# Patient Record
Sex: FEMALE | Race: WHITE | NOT HISPANIC OR LATINO | Employment: FULL TIME | URBAN - METROPOLITAN AREA
[De-identification: names, ages, dates, MRNs, and addresses within clinical notes are randomized per-mention and may not be internally consistent; named-entity substitution may affect disease eponyms.]

---

## 2019-12-15 ENCOUNTER — HOSPITAL ENCOUNTER (EMERGENCY)
Facility: HOSPITAL | Age: 59
Discharge: HOME | End: 2019-12-15
Attending: EMERGENCY MEDICINE
Payer: COMMERCIAL

## 2019-12-15 VITALS
TEMPERATURE: 98.7 F | SYSTOLIC BLOOD PRESSURE: 164 MMHG | DIASTOLIC BLOOD PRESSURE: 87 MMHG | HEART RATE: 58 BPM | OXYGEN SATURATION: 95 % | RESPIRATION RATE: 18 BRPM

## 2019-12-15 DIAGNOSIS — F41.9 ANXIETY: Primary | ICD-10-CM

## 2019-12-15 PROCEDURE — 99283 EMERGENCY DEPT VISIT LOW MDM: CPT

## 2019-12-15 PROCEDURE — 99282 EMERGENCY DEPT VISIT SF MDM: CPT | Performed by: EMERGENCY MEDICINE

## 2019-12-15 RX ORDER — METOPROLOL SUCCINATE 50 MG/1
25 TABLET, EXTENDED RELEASE ORAL DAILY
COMMUNITY
Start: 2019-12-10

## 2019-12-15 RX ORDER — FAMOTIDINE 40 MG/1
40 TABLET, FILM COATED ORAL DAILY
COMMUNITY
Start: 2019-12-10 | End: 2022-07-26

## 2019-12-15 NOTE — ED PROVIDER NOTES
Emergency Department Note    Encounter Date 12/15/2019    Diagnosis  1  Anxiety        MDM  Number of Diagnoses or Management Options  Anxiety:   Diagnosis management comments: Presents with anxiety reaction patient believes is related to antihypertensive medication she has been placed on  Afebrile  Elevated blood pressure reading on arrival improved during ED stay without any specific treatment otherwise VSS  Exam reveals somewhat anxious patient with no concerning findings who is responsive to calming measures and talking through things  Clinical suspicion for significant medication side effect is very low at the time of this report  I suspect an underlying anxiety about being on medications that she does not want to stay on  Had long discussion with patient and family at bedside regarding Wellness and healthy living and both were very responsive and optimistic after this encounter  No strong indication for diagnostics at the time of this report  Okay for discharge home with instructions for follow-up and signs and symptoms that would warrant return to the emergency department  CC  Chief Complaint   Patient presents with    Anxiety       PMH significant for HTN complains of gradual onset anxiety reactions regarding and revolving around antihypertensive medications that she was on first losartan and then metoprolol of fluctuating intensity over the past few days  Denies thoughts of SI/HI, fever, rash, joint pain/swelling, headache, vision changes, hearing changes, chest pain, shortness of breath, abdominal pain and changes in bladder habits  History  No current facility-administered medications for this encounter       Current Outpatient Medications:     famotidine (PEPCID) 40 MG tablet, Take 40 mg by mouth daily, Disp: , Rfl:     metoprolol succinate (TOPROL-XL) 50 mg 24 hr tablet, Take 50 mg by mouth daily, Disp: , Rfl:      Past Medical History:   Diagnosis Date    GERD (gastroesophageal reflux disease)     Hypertension        Past Surgical History:   Procedure Laterality Date    ABDOMINAL SURGERY      DILATION AND CURETTAGE, DIAGNOSTIC / THERAPEUTIC      TONSILLECTOMY      TUBAL LIGATION         History reviewed  No pertinent family history  Social History     Socioeconomic History    Marital status: /Civil Union     Spouse name: Not on file    Number of children: Not on file    Years of education: Not on file    Highest education level: Not on file   Occupational History    Not on file   Social Needs    Financial resource strain: Not on file    Food insecurity:     Worry: Not on file     Inability: Not on file    Transportation needs:     Medical: Not on file     Non-medical: Not on file   Tobacco Use    Smoking status: Former Smoker     Types: Cigarettes     Last attempt to quit: 4/15/2007     Years since quittin 6    Smokeless tobacco: Never Used   Substance and Sexual Activity    Alcohol use: Yes     Comment: social    Drug use: Never    Sexual activity: Not on file   Lifestyle    Physical activity:     Days per week: Not on file     Minutes per session: Not on file    Stress: Not on file   Relationships    Social connections:     Talks on phone: Not on file     Gets together: Not on file     Attends [employer] service: Not on file     Active member of club or organization: Not on file     Attends meetings of clubs or organizations: Not on file     Relationship status: Not on file    Intimate partner violence:     Fear of current or ex partner: Not on file     Emotionally abused: Not on file     Physically abused: Not on file     Forced sexual activity: Not on file   Other Topics Concern    Not on file   Social History Narrative    Not on file       Review of Systems   All other systems reviewed and are negative        Vital Signs  Vitals:    12/15/19 1757 12/15/19 1829   BP: (!) 185/86 164/87   TempSrc: Temporal    Pulse: 60 58   Resp: 18 Patient Position - Orthostatic VS:  Sitting   Temp: 98 7 °F (37 1 °C)        Physical Exam   Constitutional: She appears well-developed and well-nourished  HENT:   Head: Normocephalic and atraumatic  Eyes: Conjunctivae and EOM are normal    Neck: Normal range of motion  Neck supple  Cardiovascular: Normal rate and regular rhythm  Pulmonary/Chest: Effort normal and breath sounds normal    Musculoskeletal: Normal range of motion  She exhibits no deformity  Neurological: She is alert  No focal deficit   Skin: Skin is warm and dry  Psychiatric: She has a normal mood and affect  Her behavior is normal    Nursing note and vitals reviewed  ED Medications  Medications - No data to display    Procedures   None  ED Course        Disposition  Time reflects when diagnosis was documented in both MDM as applicable and the Disposition within this note     Time User Action Codes Description Comment    12/15/2019  6:09 PM Manjit Simpson Add [F41 9] Anxiety       ED Disposition     ED Disposition Condition Date/Time Comment    Discharge Stable Sun Dec 15, 2019  6:09 PM Gala Starch Cutting discharge to home/self care              Follow-up Information     Follow up With Specialties Details Why [address] Call in 2 days To schedule an appointment for re-evaluation [address] [place] [address]  [phone]             ED Provider  Electronically signed by:     Sarah Perez DO  12/15/19 1833

## 2020-09-03 ENCOUNTER — APPOINTMENT (EMERGENCY)
Dept: CT IMAGING | Facility: HOSPITAL | Age: 60
End: 2020-09-03
Payer: COMMERCIAL

## 2020-09-03 ENCOUNTER — HOSPITAL ENCOUNTER (EMERGENCY)
Facility: HOSPITAL | Age: 60
Discharge: HOME | End: 2020-09-03
Attending: EMERGENCY MEDICINE | Admitting: EMERGENCY MEDICINE
Payer: COMMERCIAL

## 2020-09-03 VITALS
RESPIRATION RATE: 18 BRPM | TEMPERATURE: 98.2 F | OXYGEN SATURATION: 97 % | DIASTOLIC BLOOD PRESSURE: 93 MMHG | SYSTOLIC BLOOD PRESSURE: 200 MMHG | HEART RATE: 62 BPM

## 2020-09-03 DIAGNOSIS — R10.9 ABDOMINAL PAIN: Primary | ICD-10-CM

## 2020-09-03 DIAGNOSIS — K62.5 RECTAL BLEEDING: ICD-10-CM

## 2020-09-03 DIAGNOSIS — K57.92 DIVERTICULITIS: ICD-10-CM

## 2020-09-03 LAB
ALBUMIN SERPL BCP-MCNC: 4.2 G/DL (ref 3.5–5.7)
ALP SERPL-CCNC: 79 U/L (ref 55–165)
ALT SERPL W P-5'-P-CCNC: 18 U/L (ref 7–52)
ANION GAP SERPL CALCULATED.3IONS-SCNC: 5 MMOL/L (ref 4–13)
AST SERPL W P-5'-P-CCNC: 14 U/L (ref 13–39)
BACTERIA UR QL AUTO: ABNORMAL /HPF
BASOPHILS # BLD AUTO: 0 THOUSANDS/ΜL (ref 0–0.1)
BASOPHILS NFR BLD AUTO: 1 % (ref 0–2)
BILIRUB SERPL-MCNC: 0.9 MG/DL (ref 0.2–1)
BILIRUB UR QL STRIP: NEGATIVE
BUN SERPL-MCNC: 14 MG/DL (ref 7–25)
CALCIUM SERPL-MCNC: 9 MG/DL (ref 8.6–10.5)
CHLORIDE SERPL-SCNC: 107 MMOL/L (ref 98–107)
CLARITY UR: CLEAR
CO2 SERPL-SCNC: 27 MMOL/L (ref 21–31)
COLOR UR: YELLOW
CREAT SERPL-MCNC: 0.78 MG/DL (ref 0.6–1.2)
EOSINOPHIL # BLD AUTO: 0.1 THOUSAND/ΜL (ref 0–0.61)
EOSINOPHIL NFR BLD AUTO: 2 % (ref 0–5)
ERYTHROCYTE [DISTWIDTH] IN BLOOD BY AUTOMATED COUNT: 13 % (ref 11.5–14.5)
GFR SERPL CREATININE-BSD FRML MDRD: 82 ML/MIN/1.73SQ M
GLUCOSE SERPL-MCNC: 106 MG/DL (ref 65–99)
GLUCOSE UR STRIP-MCNC: NEGATIVE MG/DL
HCT VFR BLD AUTO: 45.8 % (ref 42–47)
HGB BLD-MCNC: 15.5 G/DL (ref 12–16)
HGB UR QL STRIP.AUTO: NEGATIVE
KETONES UR STRIP-MCNC: NEGATIVE MG/DL
LEUKOCYTE ESTERASE UR QL STRIP: ABNORMAL
LIPASE SERPL-CCNC: 19 U/L (ref 11–82)
LYMPHOCYTES # BLD AUTO: 1.2 THOUSANDS/ΜL (ref 0.6–4.47)
LYMPHOCYTES NFR BLD AUTO: 24 % (ref 21–51)
MAGNESIUM SERPL-MCNC: 2 MG/DL (ref 1.9–2.7)
MCH RBC QN AUTO: 31.1 PG (ref 26–34)
MCHC RBC AUTO-ENTMCNC: 33.8 G/DL (ref 31–37)
MCV RBC AUTO: 92 FL (ref 81–99)
MONOCYTES # BLD AUTO: 0.4 THOUSAND/ΜL (ref 0.17–1.22)
MONOCYTES NFR BLD AUTO: 7 % (ref 2–12)
NEUTROPHILS # BLD AUTO: 3.4 THOUSANDS/ΜL (ref 1.4–6.5)
NEUTS SEG NFR BLD AUTO: 67 % (ref 42–75)
NITRITE UR QL STRIP: NEGATIVE
NON-SQ EPI CELLS URNS QL MICRO: ABNORMAL /HPF
PH UR STRIP.AUTO: 5.5 [PH]
PLATELET # BLD AUTO: 217 THOUSANDS/UL (ref 149–390)
PMV BLD AUTO: 9 FL (ref 8.6–11.7)
POTASSIUM SERPL-SCNC: 3.9 MMOL/L (ref 3.5–5.5)
PROT SERPL-MCNC: 6.5 G/DL (ref 6.4–8.9)
PROT UR STRIP-MCNC: NEGATIVE MG/DL
RBC # BLD AUTO: 4.99 MILLION/UL (ref 3.9–5.2)
RBC #/AREA URNS AUTO: ABNORMAL /HPF
SODIUM SERPL-SCNC: 139 MMOL/L (ref 134–143)
SP GR UR STRIP.AUTO: 1.02 (ref 1–1.03)
UROBILINOGEN UR QL STRIP.AUTO: 0.2 E.U./DL
WBC # BLD AUTO: 5.1 THOUSAND/UL (ref 4.8–10.8)
WBC #/AREA URNS AUTO: ABNORMAL /HPF

## 2020-09-03 PROCEDURE — 96361 HYDRATE IV INFUSION ADD-ON: CPT

## 2020-09-03 PROCEDURE — 85025 COMPLETE CBC W/AUTO DIFF WBC: CPT | Performed by: EMERGENCY MEDICINE

## 2020-09-03 PROCEDURE — 99285 EMERGENCY DEPT VISIT HI MDM: CPT

## 2020-09-03 PROCEDURE — 80053 COMPREHEN METABOLIC PANEL: CPT | Performed by: EMERGENCY MEDICINE

## 2020-09-03 PROCEDURE — 36415 COLL VENOUS BLD VENIPUNCTURE: CPT | Performed by: EMERGENCY MEDICINE

## 2020-09-03 PROCEDURE — 99284 EMERGENCY DEPT VISIT MOD MDM: CPT | Performed by: EMERGENCY MEDICINE

## 2020-09-03 PROCEDURE — 74177 CT ABD & PELVIS W/CONTRAST: CPT

## 2020-09-03 PROCEDURE — 81001 URINALYSIS AUTO W/SCOPE: CPT | Performed by: EMERGENCY MEDICINE

## 2020-09-03 PROCEDURE — 83690 ASSAY OF LIPASE: CPT | Performed by: EMERGENCY MEDICINE

## 2020-09-03 PROCEDURE — 96374 THER/PROPH/DIAG INJ IV PUSH: CPT

## 2020-09-03 PROCEDURE — G1004 CDSM NDSC: HCPCS

## 2020-09-03 PROCEDURE — 81003 URINALYSIS AUTO W/O SCOPE: CPT | Performed by: EMERGENCY MEDICINE

## 2020-09-03 PROCEDURE — 83735 ASSAY OF MAGNESIUM: CPT | Performed by: EMERGENCY MEDICINE

## 2020-09-03 RX ORDER — ONDANSETRON 2 MG/ML
4 INJECTION INTRAMUSCULAR; INTRAVENOUS ONCE
Status: DISCONTINUED | OUTPATIENT
Start: 2020-09-03 | End: 2020-09-03 | Stop reason: HOSPADM

## 2020-09-03 RX ORDER — AMOXICILLIN AND CLAVULANATE POTASSIUM 875; 125 MG/1; MG/1
1 TABLET, FILM COATED ORAL EVERY 12 HOURS
Qty: 14 TABLET | Refills: 0 | Status: SHIPPED | OUTPATIENT
Start: 2020-09-03 | End: 2020-09-13

## 2020-09-03 RX ADMIN — IOHEXOL 100 ML: 350 INJECTION, SOLUTION INTRAVENOUS at 11:02

## 2020-09-03 RX ADMIN — SODIUM CHLORIDE 1000 ML: 0.9 INJECTION, SOLUTION INTRAVENOUS at 08:50

## 2020-09-03 RX ADMIN — FAMOTIDINE 20 MG: 10 INJECTION, SOLUTION INTRAVENOUS at 08:52

## 2020-09-03 NOTE — DISCHARGE INSTRUCTIONS
You were seen for abdominal pain and bleeding  Your bloodwork was reassuring but your CT showed an infection called diverticulitis  Take the antibiotic as prescribed until it is finished      Follow up with gastroenterology for further care, and to discuss need for routine/screening colonoscopy for general care and colon cancer screening    Return if you develop worsening pain, or new symptoms

## 2020-09-03 NOTE — Clinical Note
Cesario Rucker was seen and treated in our emergency department on 9/3/2020  Diagnosis:     Loli Barry    She may return on this date: 09/05/2020         If you have any questions or concerns, please don't hesitate to call        Stanford Castleman, DO    ______________________________           _______________          _______________  Hospital Representative                              Date                                Time

## 2020-09-03 NOTE — ED PROVIDER NOTES
History  Chief Complaint   Patient presents with    Rectal Bleeding    Abdominal Pain     Patient is a 49-year-old female with chief complaint of abdominal pain and rectal bleeding which has been present for many years  Patient risk today because she is concerned about something serious going on  Patient describes cramping in the lower abdomen which was initially thought to be related to her uterus however she had a hysterectomy with no relief and I symptoms  She also describes bright red blood per rectum which has been intermittent in the setting of constipation, again over many years  Patient has been told she has hemorrhoids as a cause for this in the past   She has had intermittent nausea, no vomiting  Intermittent constipation and diarrhea  History provided by:  Patient  Abdominal Pain   Pain location:  Suprapubic  Pain quality: dull    Pain radiates to:  Does not radiate  Pain severity:  Mild  Onset quality:  Gradual  Duration:  2 days  Timing:  Intermittent  Progression:  Waxing and waning  Chronicity:  Chronic  Context: not laxative use and not sick contacts    Relieved by:  Nothing  Worsened by:  Nothing  Ineffective treatments:  None tried  Associated symptoms: no chest pain, no chills, no cough, no diarrhea, no dysuria, no fever, no nausea, no sore throat and no vomiting        Prior to Admission Medications   Prescriptions Last Dose Informant Patient Reported? Taking?    Multiple Vitamins-Minerals (ZINC PO)   Yes Yes   Sig: Take by mouth   cyanocobalamin (VITAMIN B-12) 1000 MCG tablet   Yes Yes   Sig: Take 1,000 mcg by mouth daily   famotidine (PEPCID) 40 MG tablet 9/2/2020 at Unknown time  Yes Yes   Sig: Take 40 mg by mouth daily   metoprolol succinate (TOPROL-XL) 50 mg 24 hr tablet 9/3/2020 at Unknown time Self Yes Yes   Sig: Take 25 mg by mouth daily       Facility-Administered Medications: None       Past Medical History:   Diagnosis Date    GERD (gastroesophageal reflux disease)     Hypertension     [facility] ([facility] [address] )     Urinary tract bacterial infections        Past Surgical History:   Procedure Laterality Date    ABDOMINAL SURGERY      DILATION AND CURETTAGE, DIAGNOSTIC / THERAPEUTIC      HYSTERECTOMY      TONSILLECTOMY      TUBAL LIGATION         History reviewed  No pertinent family history  I have reviewed and agree with the history as documented  E-Cigarette/Vaping    E-Cigarette Use Never User      E-Cigarette/Vaping Substances     Social History     Tobacco Use    Smoking status: Former Smoker     Types: Cigarettes     Last attempt to quit: 4/15/2007     Years since quittin 3    Smokeless tobacco: Never Used   Substance Use Topics    Alcohol use: Yes     Frequency: Monthly or less     Comment: social    Drug use: Never       Review of Systems   Constitutional: Negative for chills and fever  HENT: Negative for congestion, nosebleeds, rhinorrhea and sore throat  Eyes: Negative for pain and visual disturbance  Respiratory: Negative for cough and wheezing  Cardiovascular: Negative for chest pain and leg swelling  Gastrointestinal: Negative for abdominal distention, abdominal pain, diarrhea, nausea and vomiting  Genitourinary: Negative for dysuria and frequency  Musculoskeletal: Negative for back pain and joint swelling  Skin: Negative for rash and wound  Neurological: Negative for weakness and numbness  Psychiatric/Behavioral: Negative for decreased concentration and suicidal ideas  Physical Exam  Physical Exam  Vitals signs and nursing note reviewed  Constitutional:       Appearance: She is well-developed  HENT:      Head: Normocephalic and atraumatic  Eyes:      Conjunctiva/sclera: Conjunctivae normal       Pupils: Pupils are equal, round, and reactive to light  Neck:      Musculoskeletal: Normal range of motion and neck supple  Trachea: No tracheal deviation  Cardiovascular:      Rate and Rhythm: Normal rate and regular rhythm  Heart sounds: Normal heart sounds  No murmur  Pulmonary:      Effort: Pulmonary effort is normal  No respiratory distress  Breath sounds: Normal breath sounds  No wheezing or rales  Abdominal:      General: Bowel sounds are normal  There is no distension  Palpations: Abdomen is soft  Tenderness: There is no abdominal tenderness  Musculoskeletal:         General: No deformity  Skin:     General: Skin is warm and dry  Capillary Refill: Capillary refill takes less than 2 seconds  Neurological:      Mental Status: She is alert and oriented to person, place, and time  Sensory: No sensory deficit     Psychiatric:         Judgment: Judgment normal          Vital Signs  ED Triage Vitals   Temperature Pulse Respirations Blood Pressure SpO2   09/03/20 0822 09/03/20 0822 09/03/20 0822 09/03/20 0822 09/03/20 0822   98 2 °F (36 8 °C) 62 18 (!) 207/103 97 %      Temp Source Heart Rate Source Patient Position - Orthostatic VS BP Location FiO2 (%)   09/03/20 0822 -- -- -- --   Oral          Pain Score       09/03/20 1145       7           Vitals:    09/03/20 0822 09/03/20 1145   BP: (!) 207/103 (!) 200/93   Pulse: 62          Visual Acuity      ED Medications  Medications   sodium chloride 0 9 % bolus 1,000 mL (0 mL Intravenous Stopped 9/3/20 1145)   famotidine (PEPCID) injection 20 mg (20 mg Intravenous Given 9/3/20 0852)   iohexol (OMNIPAQUE) 350 MG/ML injection (MULTI-DOSE) 100 mL (100 mL Intravenous Given 9/3/20 1102)       Diagnostic Studies  Results Reviewed     Procedure Component Value Units Date/Time    Urine Microscopic [[id number]]  (Abnormal) Collected:  09/03/20 0937    Lab Status:  Final result Specimen:  Urine, Clean Catch Updated:  09/03/20 0959     RBC, UA None Seen /hpf      WBC, UA 4-10 /hpf      Epithelial Cells Occasional /hpf      Bacteria, UA Occasional /hpf     UA (URINE) with reflex to Scope [[id number]]  (Abnormal) Collected:  09/03/20 0937    Lab Status:  Final result Specimen:  Urine, Clean Catch Updated:  09/03/20 0956     Color, UA Yellow     Clarity, UA Clear     Specific [place], UA 1 020     pH, UA 5 5     Leukocytes, UA 1+     Nitrite, UA Negative     Protein, UA Negative mg/dl      Glucose, UA Negative mg/dl      Ketones, UA Negative mg/dl      Urobilinogen, UA 0 2 E U /dl      Bilirubin, UA Negative     Blood, UA Negative    Comprehensive metabolic panel [[id number]]  (Abnormal) Collected:  09/03/20 0849    Lab Status:  Final result Specimen:  Blood from Arm, Left Updated:  09/03/20 0924     Sodium 139 mmol/L      Potassium 3 9 mmol/L      Chloride 107 mmol/L      CO2 27 mmol/L      ANION GAP 5 mmol/L      BUN 14 mg/dL      Creatinine 0 78 mg/dL      Glucose 106 mg/dL      Calcium 9 0 mg/dL      AST 14 U/L      ALT 18 U/L      Alkaline Phosphatase 79 U/L      Total Protein 6 5 g/dL      Albumin 4 2 g/dL      Total Bilirubin 0 90 mg/dL      eGFR 82 ml/min/1 73sq m     Narrative:       [place] guidelines for Chronic Kidney Disease (CKD):     Stage 1 with normal or high GFR (GFR > 90 mL/min/1 73 square meters)    Stage 2 Mild CKD (GFR = 60-89 mL/min/1 73 square meters)    Stage 3A Moderate CKD (GFR = 45-59 mL/min/1 73 square meters)    Stage 3B Moderate CKD (GFR = 30-44 mL/min/1 73 square meters)    Stage 4 Severe CKD (GFR = 15-29 mL/min/1 73 square meters)    Stage 5 End Stage CKD (GFR <15 mL/min/1 73 square meters)  Note: GFR calculation is accurate only with a steady state creatinine    Lipase [[id number]]  (Normal) Collected:  09/03/20 0849    Lab Status:  Final result Specimen:  Blood from Arm, Left Updated:  09/03/20 0923     Lipase 19 u/L     Magnesium [[id number]]  (Normal) Collected:  09/03/20 0849    Lab Status:  Final result Specimen:  Blood from Arm, Left Updated:  09/03/20 0923     Magnesium 2 0 mg/dL     CBC and differential [[id number]]  (Normal) Collected:  09/03/20 0849    Lab Status:  Final result Specimen:  Blood from Arm, Left Updated:  09/03/20 0855     WBC 5 10 Thousand/uL      RBC 4 99 Million/uL      Hemoglobin 15 5 g/dL      Hematocrit 45 8 %      MCV 92 fL      MCH 31 1 pg      MCHC 33 8 g/dL      RDW 13 0 %      MPV 9 0 fL      Platelets 746 Thousands/uL      Neutrophils Relative 67 %      Lymphocytes Relative 24 %      Monocytes Relative 7 %      Eosinophils Relative 2 %      Basophils Relative 1 %      Neutrophils Absolute 3 40 Thousands/µL      Lymphocytes Absolute 1 20 Thousands/µL      Monocytes Absolute 0 40 Thousand/µL      Eosinophils Absolute 0 10 Thousand/µL      Basophils Absolute 0 00 Thousands/µL                  CT abdomen pelvis with contrast   Final Result by Lizzeth Coley MD (09/03 1114)   Cholelithiasis  Contracted gallbladder  No acute cholecystitis      Mild acute sigmoid diverticulitis without localized fluid collection or abscess               Workstation performed: [id number]                    Procedures  Procedures         ED Course       Patient with mild diverticulitis, comfortable with outpatient treatment  Will f/u with GI for routine colonoscopy                                      MDM  Number of Diagnoses or Management Options  Abdominal pain: new and requires workup  Diverticulitis: new and requires workup  Rectal bleeding: new and requires workup  Diagnosis management comments: Patient has intermittent abdominal pain and bleeding over many years, worse today  DDx includes diverticulitis, mass, abscess, NALLELY  Will get labs, CT abd/pelvis  No anticoagulation  Chart lists Iohexol allergy from 1/1/1980  Patient denies iohexol allergy; listed symptoms not consistent with anaphylaxis         Amount and/or Complexity of Data Reviewed  Clinical lab tests: ordered and reviewed  Tests in the radiology section of CPT®: ordered and reviewed  Review and summarize past medical records: yes  Independent visualization of images, tracings, or specimens: yes    Risk of Complications, Morbidity, and/or Mortality  Presenting problems: high  Diagnostic procedures: minimal  Management options: high          Disposition  Final diagnoses:   Abdominal pain   Rectal bleeding   Diverticulitis     Time reflects when diagnosis was documented in both MDM as applicable and the Disposition within this note     Time User Action Codes Description Comment    9/3/2020 11:14 AM Na Briones Add [R10 9] Abdominal pain     9/3/2020 11:14 AM Na Billet Add [K62 5] Rectal bleeding     9/3/2020 11:29 AM Na Deniset Add [K57 92] Diverticulitis       ED Disposition     ED Disposition Condition Date/Time Comment    Discharge Stable Th Sep 3, 2020 11:29 AM Daniel Cronin Cutting discharge to home/self care  Follow-up Information     Follow up With Specialties Details Why Contact Info    Johnathan Bowen MD Gastroenterology Schedule an appointment as soon as possible for a visit   [address] [place] [address], DO Family Medicine  As needed [address] [facility]  [address]  [phone]            Discharge Medication List as of 9/3/2020 11:33 AM      START taking these medications    Details   amoxicillin-clavulanate (AUGMENTIN) 875-125 mg per tablet Take 1 tablet by mouth every 12 (twelve) hours for 10 days, Starting Thu 9/3/2020, Until Sun 9/13/2020, Normal         CONTINUE these medications which have NOT CHANGED    Details   cyanocobalamin (VITAMIN B-12) 1000 MCG tablet Take 1,000 mcg by mouth daily, Historical Med      famotidine (PEPCID) 40 MG tablet Take 40 mg by mouth daily, Starting Tue 12/10/2019, Until Thu 9/3/2020, Historical Med      metoprolol succinate (TOPROL-XL) 50 mg 24 hr tablet Take 25 mg by mouth daily , Starting Tue 12/10/2019, Until Wed 12/9/2020, Historical Med      Multiple Vitamins-Minerals (ZINC PO) Take by mouth, Historical Med           No discharge procedures on file      PDMP Review     None          ED Provider  Electronically Signed by Jimmy Diez, DO  09/03/20 1914

## 2020-12-22 ENCOUNTER — TRANSCRIBE ORDERS (OUTPATIENT)
Dept: LAB | Facility: CLINIC | Age: 60
End: 2020-12-22

## 2020-12-22 ENCOUNTER — LAB (OUTPATIENT)
Dept: LAB | Facility: CLINIC | Age: 60
End: 2020-12-22
Payer: COMMERCIAL

## 2020-12-22 DIAGNOSIS — E78.2 MIXED HYPERLIPIDEMIA: ICD-10-CM

## 2020-12-22 DIAGNOSIS — I10 ESSENTIAL HYPERTENSION, MALIGNANT: ICD-10-CM

## 2020-12-22 DIAGNOSIS — I10 ESSENTIAL HYPERTENSION, MALIGNANT: Primary | ICD-10-CM

## 2020-12-22 DIAGNOSIS — E55.9 VITAMIN D DEFICIENCY DISEASE: ICD-10-CM

## 2020-12-22 LAB
25(OH)D3 SERPL-MCNC: 21.3 NG/ML (ref 30–100)
ALBUMIN SERPL BCP-MCNC: 3.8 G/DL (ref 3.5–5)
ALP SERPL-CCNC: 108 U/L (ref 46–116)
ALT SERPL W P-5'-P-CCNC: 29 U/L (ref 12–78)
ANION GAP SERPL CALCULATED.3IONS-SCNC: 5 MMOL/L (ref 4–13)
AST SERPL W P-5'-P-CCNC: 21 U/L (ref 5–45)
BASOPHILS # BLD AUTO: 0.06 THOUSANDS/ΜL (ref 0–0.1)
BASOPHILS NFR BLD AUTO: 1 % (ref 0–1)
BILIRUB SERPL-MCNC: 1.02 MG/DL (ref 0.2–1)
BUN SERPL-MCNC: 17 MG/DL (ref 5–25)
CALCIUM SERPL-MCNC: 9.4 MG/DL (ref 8.3–10.1)
CHLORIDE SERPL-SCNC: 110 MMOL/L (ref 100–108)
CHOLEST SERPL-MCNC: 230 MG/DL (ref 50–200)
CO2 SERPL-SCNC: 27 MMOL/L (ref 21–32)
CREAT SERPL-MCNC: 0.76 MG/DL (ref 0.6–1.3)
EOSINOPHIL # BLD AUTO: 0.15 THOUSAND/ΜL (ref 0–0.61)
EOSINOPHIL NFR BLD AUTO: 2 % (ref 0–6)
ERYTHROCYTE [DISTWIDTH] IN BLOOD BY AUTOMATED COUNT: 12.7 % (ref 11.6–15.1)
GFR SERPL CREATININE-BSD FRML MDRD: 84 ML/MIN/1.73SQ M
GLUCOSE P FAST SERPL-MCNC: 84 MG/DL (ref 65–99)
HCT VFR BLD AUTO: 46.9 % (ref 34.8–46.1)
HDLC SERPL-MCNC: 42 MG/DL
HGB BLD-MCNC: 15.1 G/DL (ref 11.5–15.4)
IMM GRANULOCYTES # BLD AUTO: 0.01 THOUSAND/UL (ref 0–0.2)
IMM GRANULOCYTES NFR BLD AUTO: 0 % (ref 0–2)
LDLC SERPL CALC-MCNC: 161 MG/DL (ref 0–100)
LYMPHOCYTES # BLD AUTO: 1.5 THOUSANDS/ΜL (ref 0.6–4.47)
LYMPHOCYTES NFR BLD AUTO: 24 % (ref 14–44)
MCH RBC QN AUTO: 30.8 PG (ref 26.8–34.3)
MCHC RBC AUTO-ENTMCNC: 32.2 G/DL (ref 31.4–37.4)
MCV RBC AUTO: 96 FL (ref 82–98)
MONOCYTES # BLD AUTO: 0.57 THOUSAND/ΜL (ref 0.17–1.22)
MONOCYTES NFR BLD AUTO: 9 % (ref 4–12)
NEUTROPHILS # BLD AUTO: 3.91 THOUSANDS/ΜL (ref 1.85–7.62)
NEUTS SEG NFR BLD AUTO: 64 % (ref 43–75)
NONHDLC SERPL-MCNC: 188 MG/DL
NRBC BLD AUTO-RTO: 0 /100 WBCS
PLATELET # BLD AUTO: 254 THOUSANDS/UL (ref 149–390)
PMV BLD AUTO: 11 FL (ref 8.9–12.7)
POTASSIUM SERPL-SCNC: 4.2 MMOL/L (ref 3.5–5.3)
PROT SERPL-MCNC: 6.8 G/DL (ref 6.4–8.2)
RBC # BLD AUTO: 4.91 MILLION/UL (ref 3.81–5.12)
SODIUM SERPL-SCNC: 142 MMOL/L (ref 136–145)
TRIGL SERPL-MCNC: 135 MG/DL
TSH SERPL DL<=0.05 MIU/L-ACNC: 1.35 UIU/ML (ref 0.36–3.74)
WBC # BLD AUTO: 6.2 THOUSAND/UL (ref 4.31–10.16)

## 2020-12-22 PROCEDURE — 80053 COMPREHEN METABOLIC PANEL: CPT

## 2020-12-22 PROCEDURE — 82306 VITAMIN D 25 HYDROXY: CPT

## 2020-12-22 PROCEDURE — 84443 ASSAY THYROID STIM HORMONE: CPT

## 2020-12-22 PROCEDURE — 85025 COMPLETE CBC W/AUTO DIFF WBC: CPT

## 2020-12-22 PROCEDURE — 36415 COLL VENOUS BLD VENIPUNCTURE: CPT

## 2020-12-22 PROCEDURE — 80061 LIPID PANEL: CPT

## 2021-01-09 ENCOUNTER — APPOINTMENT (EMERGENCY)
Dept: CT IMAGING | Facility: HOSPITAL | Age: 61
DRG: 305 | End: 2021-01-09
Payer: COMMERCIAL

## 2021-01-09 ENCOUNTER — HOSPITAL ENCOUNTER (INPATIENT)
Facility: HOSPITAL | Age: 61
LOS: 2 days | Discharge: HOME | DRG: 305 | End: 2021-01-11
Attending: EMERGENCY MEDICINE | Admitting: INTERNAL MEDICINE
Payer: COMMERCIAL

## 2021-01-09 ENCOUNTER — APPOINTMENT (EMERGENCY)
Dept: RADIOLOGY | Facility: HOSPITAL | Age: 61
DRG: 305 | End: 2021-01-09
Payer: COMMERCIAL

## 2021-01-09 DIAGNOSIS — I65.09 VERTEBRAL ARTERY OCCLUSION: ICD-10-CM

## 2021-01-09 DIAGNOSIS — I10 ESSENTIAL HYPERTENSION: ICD-10-CM

## 2021-01-09 DIAGNOSIS — R29.90 STROKE-LIKE SYMPTOMS: ICD-10-CM

## 2021-01-09 DIAGNOSIS — I63.9 CVA (CEREBRAL VASCULAR ACCIDENT) (HCC): ICD-10-CM

## 2021-01-09 DIAGNOSIS — I67.1 BRAIN ANEURYSM: ICD-10-CM

## 2021-01-09 DIAGNOSIS — R42 DIZZINESS: Primary | ICD-10-CM

## 2021-01-09 DIAGNOSIS — I67.1 INTRACRANIAL ANEURYSM: ICD-10-CM

## 2021-01-09 LAB
ALBUMIN SERPL BCP-MCNC: 4.2 G/DL (ref 3.5–5.7)
ALP SERPL-CCNC: 89 U/L (ref 55–165)
ALT SERPL W P-5'-P-CCNC: 15 U/L (ref 7–52)
ANION GAP SERPL CALCULATED.3IONS-SCNC: 6 MMOL/L (ref 4–13)
AST SERPL W P-5'-P-CCNC: 13 U/L (ref 13–39)
BASOPHILS # BLD AUTO: 0 THOUSANDS/ΜL (ref 0–0.1)
BASOPHILS NFR BLD AUTO: 1 % (ref 0–2)
BILIRUB SERPL-MCNC: 0.8 MG/DL (ref 0.2–1)
BNP SERPL-MCNC: 53 PG/ML (ref 1–100)
BUN SERPL-MCNC: 17 MG/DL (ref 7–25)
CALCIUM SERPL-MCNC: 9 MG/DL (ref 8.6–10.5)
CHLORIDE SERPL-SCNC: 106 MMOL/L (ref 98–107)
CO2 SERPL-SCNC: 28 MMOL/L (ref 21–31)
CREAT SERPL-MCNC: 0.76 MG/DL (ref 0.6–1.2)
CRP SERPL QL: <5 MG/L
EOSINOPHIL # BLD AUTO: 0.1 THOUSAND/ΜL (ref 0–0.61)
EOSINOPHIL NFR BLD AUTO: 1 % (ref 0–5)
ERYTHROCYTE [DISTWIDTH] IN BLOOD BY AUTOMATED COUNT: 12.8 % (ref 11.5–14.5)
GFR SERPL CREATININE-BSD FRML MDRD: 84 ML/MIN/1.73SQ M
GLUCOSE SERPL-MCNC: 107 MG/DL (ref 65–99)
GLUCOSE SERPL-MCNC: 109 MG/DL (ref 65–140)
HCT VFR BLD AUTO: 46.2 % (ref 42–47)
HGB BLD-MCNC: 15.2 G/DL (ref 12–16)
LYMPHOCYTES # BLD AUTO: 1.3 THOUSANDS/ΜL (ref 0.6–4.47)
LYMPHOCYTES NFR BLD AUTO: 22 % (ref 21–51)
MCH RBC QN AUTO: 30.6 PG (ref 26–34)
MCHC RBC AUTO-ENTMCNC: 32.9 G/DL (ref 31–37)
MCV RBC AUTO: 93 FL (ref 81–99)
MONOCYTES # BLD AUTO: 0.4 THOUSAND/ΜL (ref 0.17–1.22)
MONOCYTES NFR BLD AUTO: 6 % (ref 2–12)
NEUTROPHILS # BLD AUTO: 4.1 THOUSANDS/ΜL (ref 1.4–6.5)
NEUTS SEG NFR BLD AUTO: 70 % (ref 42–75)
PLATELET # BLD AUTO: 225 THOUSANDS/UL (ref 149–390)
PMV BLD AUTO: 8.7 FL (ref 8.6–11.7)
POTASSIUM SERPL-SCNC: 3.7 MMOL/L (ref 3.5–5.5)
PROT SERPL-MCNC: 6.7 G/DL (ref 6.4–8.9)
RBC # BLD AUTO: 4.97 MILLION/UL (ref 3.9–5.2)
SODIUM SERPL-SCNC: 140 MMOL/L (ref 134–143)
TROPONIN I SERPL-MCNC: <0.03 NG/ML
TSH SERPL DL<=0.05 MIU/L-ACNC: 1.48 UIU/ML (ref 0.45–5.33)
WBC # BLD AUTO: 5.9 THOUSAND/UL (ref 4.8–10.8)

## 2021-01-09 PROCEDURE — NC001 PR NO CHARGE: Performed by: NEUROLOGICAL SURGERY

## 2021-01-09 PROCEDURE — 83090 ASSAY OF HOMOCYSTEINE: CPT | Performed by: INTERNAL MEDICINE

## 2021-01-09 PROCEDURE — 96374 THER/PROPH/DIAG INJ IV PUSH: CPT

## 2021-01-09 PROCEDURE — 86140 C-REACTIVE PROTEIN: CPT | Performed by: INTERNAL MEDICINE

## 2021-01-09 PROCEDURE — 83880 ASSAY OF NATRIURETIC PEPTIDE: CPT | Performed by: PHYSICIAN ASSISTANT

## 2021-01-09 PROCEDURE — 99285 EMERGENCY DEPT VISIT HI MDM: CPT | Performed by: PHYSICIAN ASSISTANT

## 2021-01-09 PROCEDURE — 70498 CT ANGIOGRAPHY NECK: CPT

## 2021-01-09 PROCEDURE — G1004 CDSM NDSC: HCPCS

## 2021-01-09 PROCEDURE — 36415 COLL VENOUS BLD VENIPUNCTURE: CPT | Performed by: PHYSICIAN ASSISTANT

## 2021-01-09 PROCEDURE — 99285 EMERGENCY DEPT VISIT HI MDM: CPT

## 2021-01-09 PROCEDURE — 84443 ASSAY THYROID STIM HORMONE: CPT | Performed by: INTERNAL MEDICINE

## 2021-01-09 PROCEDURE — 93005 ELECTROCARDIOGRAM TRACING: CPT

## 2021-01-09 PROCEDURE — 96361 HYDRATE IV INFUSION ADD-ON: CPT

## 2021-01-09 PROCEDURE — 80053 COMPREHEN METABOLIC PANEL: CPT | Performed by: PHYSICIAN ASSISTANT

## 2021-01-09 PROCEDURE — 82948 REAGENT STRIP/BLOOD GLUCOSE: CPT

## 2021-01-09 PROCEDURE — 71045 X-RAY EXAM CHEST 1 VIEW: CPT

## 2021-01-09 PROCEDURE — 84484 ASSAY OF TROPONIN QUANT: CPT | Performed by: PHYSICIAN ASSISTANT

## 2021-01-09 PROCEDURE — 70496 CT ANGIOGRAPHY HEAD: CPT

## 2021-01-09 PROCEDURE — 99223 1ST HOSP IP/OBS HIGH 75: CPT | Performed by: INTERNAL MEDICINE

## 2021-01-09 PROCEDURE — 85025 COMPLETE CBC W/AUTO DIFF WBC: CPT | Performed by: PHYSICIAN ASSISTANT

## 2021-01-09 RX ORDER — ATORVASTATIN CALCIUM 40 MG/1
40 TABLET, FILM COATED ORAL EVERY EVENING
Status: DISCONTINUED | OUTPATIENT
Start: 2021-01-09 | End: 2021-01-11 | Stop reason: HOSPADM

## 2021-01-09 RX ORDER — LABETALOL 20 MG/4 ML (5 MG/ML) INTRAVENOUS SYRINGE
10 ONCE
Status: COMPLETED | OUTPATIENT
Start: 2021-01-09 | End: 2021-01-09

## 2021-01-09 RX ORDER — HYDRALAZINE HYDROCHLORIDE 20 MG/ML
10 INJECTION INTRAMUSCULAR; INTRAVENOUS EVERY 4 HOURS PRN
Status: DISCONTINUED | OUTPATIENT
Start: 2021-01-09 | End: 2021-01-11 | Stop reason: HOSPADM

## 2021-01-09 RX ORDER — LORAZEPAM 2 MG/ML
1 INJECTION INTRAMUSCULAR ONCE AS NEEDED
Status: DISCONTINUED | OUTPATIENT
Start: 2021-01-09 | End: 2021-01-11 | Stop reason: HOSPADM

## 2021-01-09 RX ORDER — ASPIRIN 325 MG
325 TABLET ORAL ONCE
Status: COMPLETED | OUTPATIENT
Start: 2021-01-09 | End: 2021-01-09

## 2021-01-09 RX ORDER — CLOPIDOGREL BISULFATE 75 MG/1
75 TABLET ORAL DAILY
Status: DISCONTINUED | OUTPATIENT
Start: 2021-01-10 | End: 2021-01-11

## 2021-01-09 RX ORDER — NIFEDIPINE 30 MG/1
30 TABLET, EXTENDED RELEASE ORAL DAILY
Status: DISCONTINUED | OUTPATIENT
Start: 2021-01-09 | End: 2021-01-11 | Stop reason: HOSPADM

## 2021-01-09 RX ORDER — METOPROLOL SUCCINATE 25 MG/1
25 TABLET, EXTENDED RELEASE ORAL DAILY
Status: DISCONTINUED | OUTPATIENT
Start: 2021-01-10 | End: 2021-01-11 | Stop reason: HOSPADM

## 2021-01-09 RX ORDER — ACETAMINOPHEN 325 MG/1
650 TABLET ORAL EVERY 6 HOURS PRN
Status: DISCONTINUED | OUTPATIENT
Start: 2021-01-09 | End: 2021-01-11 | Stop reason: HOSPADM

## 2021-01-09 RX ORDER — ONDANSETRON 2 MG/ML
4 INJECTION INTRAMUSCULAR; INTRAVENOUS EVERY 4 HOURS PRN
Status: DISCONTINUED | OUTPATIENT
Start: 2021-01-09 | End: 2021-01-11 | Stop reason: HOSPADM

## 2021-01-09 RX ORDER — CLOPIDOGREL BISULFATE 75 MG/1
300 TABLET ORAL ONCE
Status: COMPLETED | OUTPATIENT
Start: 2021-01-09 | End: 2021-01-09

## 2021-01-09 RX ORDER — FAMOTIDINE 20 MG/1
20 TABLET, FILM COATED ORAL 2 TIMES DAILY
Status: DISCONTINUED | OUTPATIENT
Start: 2021-01-09 | End: 2021-01-11 | Stop reason: HOSPADM

## 2021-01-09 RX ADMIN — FAMOTIDINE 20 MG: 20 TABLET, FILM COATED ORAL at 18:08

## 2021-01-09 RX ADMIN — IOHEXOL 85 ML: 350 INJECTION, SOLUTION INTRAVENOUS at 11:58

## 2021-01-09 RX ADMIN — SODIUM CHLORIDE 1000 ML: 0.9 INJECTION, SOLUTION INTRAVENOUS at 10:02

## 2021-01-09 RX ADMIN — LABETALOL 20 MG/4 ML (5 MG/ML) INTRAVENOUS SYRINGE 10 MG: at 14:18

## 2021-01-09 RX ADMIN — NIFEDIPINE 30 MG: 30 TABLET, FILM COATED, EXTENDED RELEASE ORAL at 18:08

## 2021-01-09 RX ADMIN — ASPIRIN 325 MG: 325 TABLET ORAL at 14:17

## 2021-01-09 RX ADMIN — CLOPIDOGREL BISULFATE 300 MG: 75 TABLET ORAL at 14:17

## 2021-01-09 RX ADMIN — ATORVASTATIN CALCIUM 40 MG: 40 TABLET, FILM COATED ORAL at 18:08

## 2021-01-09 RX ADMIN — LABETALOL 20 MG/4 ML (5 MG/ML) INTRAVENOUS SYRINGE 10 MG: at 10:02

## 2021-01-09 NOTE — ASSESSMENT & PLAN NOTE
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

## 2021-01-09 NOTE — ASSESSMENT & PLAN NOTE
· Essential hypertension currently on metoprolol 25 mg daily  · HCTZ was added by PCP however patient was worried about her sulfa allergy and did not get this prescription filled    · Will start nifedipine and hydralazine as needed

## 2021-01-09 NOTE — TELEMEDICINE
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  It is possible that this is either acute occlusion of a chronically stenotic left vert vs embolism from this artery  Dissection is also possible  In regards to the aneurysm, no history of symtoms consistant with SAH at time of ictus  Of note, HCT from [facility] in 2011 demonstrates hyderdensity in the same region of the vermis/4th ventricle making me suspicious that this has been previously present  At this juncture, I would evaluate her for stroke and VBI  Consult neurology  Obtain MRI and MRA  Will need formal evaluation of aneurysm, though can be done as outpatient  No acute endovascular intervention for occluded left vert  Will defer AP/AC therapy to neurology  20 minutes was spent in direct discussion of this patient

## 2021-01-09 NOTE — H&P
H&P- Prakash Linn 1960, 56 y o  female MRN: [id number]  Unit/Bed#: -01 Encounter: [id number]  Primary Care Provider: Pierre Kilpatrick DO   Date and time admitted to hospital: 1/9/2021  9:19 AM        Assessment and Plan  * Stroke-like symptoms  Assessment & Plan  · Dizziness for last 12 months but worsened over last several days  · Due to vertebral artery abnormality noted on CTA admission was requested for stroke pathway/evaluation for VBI  · Recommendation by neurosurgeon was MRI/MRI with neurology consultation  · Other differentials for stroke-like symptoms includes vertigo vs hypertensive encephalopathy as vertebral artery findings may have been chronic in nature  · Have PT/OT evaluate for discharge needs    Hypertension  Assessment & Plan  · Essential hypertension currently on metoprolol 25 mg daily  · HCTZ was added by PCP however patient was worried about her sulfa allergy and did not get this prescription filled  · Will start nifedipine and hydralazine as needed    Intracranial aneurysm  Assessment & Plan  · Incidental finding of 5 mm distal left PICA  Had neurosurgical consultation during ED visit  Should continue to follow-up with NSG after discharge    GERD (gastroesophageal reflux disease)  Assessment & Plan  · GERD continue famotidine      VTE Prophylaxis: Enoxaparin (Lovenox)  Code Status: Level 1 - Full Code  Anticipated Length of Stay:  Patient will be admitted on an Inpatient basis with an anticipated length of stay of  greater than 2 midnights  Justification for Hospital Stay: Stroke-like symptoms  Total Time for Visit, including Counseling / Coordination of Care: xx mins  Greater than 50% of this total time spent on direct patient counseling and coordination of care      Chief Complaint:     Chief Complaint   Patient presents with    Dizziness     History of Present Illness:    Prakash Linn is a 56 y o  female with a past medical history of hypertension who presents with approximately 12 months of dizziness  She had initially thought this was vertigo  She had also been battling hypertension however has been beta-blocker on half a tablet of 50 mg metoprolol  Her PCP recently added HCTZ but due to concern of sulfa drug allergy she did not get this prescription filled  The last 2-3 days she has had worsening ataxia and gait abnormality with sensation of leaning towards the right  She also noticed she was having visual disturbance out of left eye where she felt as if it would deviate medially  She came to the emergency department where she underwent CTA with abnormalities and case was discussed with Neurosurgery  Due to concern of vertebrobasilar insufficiency, admission was requested for stroke workup  She denies any chest pain or shortness of breath  She had significantly elevated blood pressures in the ED requiring IV labetalol with mild improvement  Review of Systems:  Review of Systems   Constitutional: Negative for chills, diaphoresis and fever  HENT: Negative for dental problem and facial swelling  Eyes: Positive for visual disturbance  Negative for photophobia and pain  Respiratory: Negative for shortness of breath, wheezing and stridor  Cardiovascular: Negative for chest pain and palpitations  Gastrointestinal: Negative for abdominal distention, abdominal pain, diarrhea, nausea and vomiting  Genitourinary: Negative for dysuria, hematuria and urgency  Musculoskeletal: Negative for back pain and myalgias  Skin: Negative for rash  Neurological: Positive for dizziness, weakness, light-headedness and headaches  Negative for seizures, speech difficulty and numbness  Psychiatric/Behavioral: Negative for agitation and suicidal ideas  The patient is not nervous/anxious  All other systems reviewed and are negative          Past Medical and Surgical History:   Past Medical History:   Diagnosis Date    GERD (gastroesophageal reflux disease)     Hypertension     Urinary tract bacterial infections      Past Surgical History:   Procedure Laterality Date    ABDOMINAL SURGERY      DILATION AND CURETTAGE, DIAGNOSTIC / THERAPEUTIC      HYSTERECTOMY      TONSILLECTOMY      TUBAL LIGATION       Meds/Allergies: Allergies: Allergies   Allergen Reactions    Sulfa Antibiotics Other (See Comments) and Rash     Massive headache     Prior to Admission Medications   Prescriptions Last Dose Informant Patient Reported? Taking?    Multiple Vitamins-Minerals (ZINC PO)   Yes No   Sig: Take 1 tablet by mouth daily    cyanocobalamin (VITAMIN B-12) 1000 MCG tablet   Yes No   Sig: Take 1,000 mcg by mouth daily   famotidine (PEPCID) 40 MG tablet   Yes Yes   Sig: Take 40 mg by mouth daily   metoprolol succinate (TOPROL-XL) 50 mg 24 hr tablet  Self Yes Yes   Sig: Take 25 mg by mouth daily       Facility-Administered Medications: None     Social History:     Social History     Socioeconomic History    Marital status: /Civil Union     Spouse name: Not on file    Number of children: Not on file    Years of education: Not on file    Highest education level: Not on file   Occupational History     Employer: [employer]   Social Needs    Financial resource strain: Not on file    Food insecurity     Worry: Not on file     Inability: Not on file    Transportation needs     Medical: Not on file     Non-medical: Not on file   Tobacco Use    Smoking status: Former Smoker     Types: Cigarettes     Quit date: 4/15/2007     Years since quittin 7    Smokeless tobacco: Never Used   Substance and Sexual Activity    Alcohol use: Yes     Frequency: Monthly or less     Comment: social    Drug use: Never    Sexual activity: Not on file   Lifestyle    Physical activity     Days per week: Not on file     Minutes per session: Not on file    Stress: Not on file   Relationships    Social connections     Talks on phone: Not on file     Gets together: Not on file     Attends Mosque service: Not on file     Active member of club or organization: Not on file     Attends meetings of clubs or organizations: Not on file     Relationship status: Not on file    Intimate partner violence     Fear of current or ex partner: Not on file     Emotionally abused: Not on file     Physically abused: Not on file     Forced sexual activity: Not on file   Other Topics Concern    Not on file   Social History Narrative    Not on file     Patient Pre-hospital Living Situation:   Patient Pre-hospital Level of Mobility:   Patient Pre-hospital Diet Restrictions:     Family History:  History reviewed  No pertinent family history  Physical Exam:   Vitals:   Blood Pressure: (!) 180/81 (01/09/21 1602)  Pulse: 61 (01/09/21 1602)  Temperature: (!) 97 1 °F (36 2 °C) (01/09/21 1602)  Temp Source: Temporal (01/09/21 1602)  Respirations: 18 (01/09/21 1602)  Height: 5' 3" (160 cm) (01/09/21 1602)  Weight - Scale: 79 9 kg (176 lb 2 4 oz) (01/09/21 1602)  SpO2: 99 % (01/09/21 1602)    Physical Exam  Vitals signs reviewed  Constitutional:       General: She is not in acute distress  Appearance: Normal appearance  HENT:      Head: Atraumatic  Mouth/Throat:      Mouth: Mucous membranes are moist       Pharynx: Oropharynx is clear  Eyes:      General: No scleral icterus  Extraocular Movements: Extraocular movements intact  Pupils: Pupils are equal, round, and reactive to light  Neck:      Musculoskeletal: Neck supple  Cardiovascular:      Rate and Rhythm: Regular rhythm  Bradycardia present  Heart sounds: Normal heart sounds  Pulmonary:      Breath sounds: Normal breath sounds  No wheezing  Abdominal:      General: Bowel sounds are normal       Palpations: Abdomen is soft  Tenderness: There is no guarding or rebound  Musculoskeletal:         General: No swelling  Skin:     General: Skin is warm     Neurological:      Mental Status: She is alert and oriented to person, place, and time  Mental status is at baseline  Coordination: Finger-Nose-Finger Test abnormal       Gait: Tandem walk abnormal    Psychiatric:         Mood and Affect: Mood normal        Lab Results: I have personally reviewed pertinent reports  Results from last 7 days   Lab Units 01/09/21  0955   WBC Thousand/uL 5 90   HEMOGLOBIN g/dL 15 2   HEMATOCRIT % 46 2   PLATELETS Thousands/uL 225   NEUTROS PCT % 70   LYMPHS PCT % 22   MONOS PCT % 6   EOS PCT % 1     Results from last 7 days   Lab Units 01/09/21  0955   SODIUM mmol/L 140   POTASSIUM mmol/L 3 7   CHLORIDE mmol/L 106   CO2 mmol/L 28   ANION GAP mmol/L 6   BUN mg/dL 17   CREATININE mg/dL 0 76   CALCIUM mg/dL 9 0   ALBUMIN g/dL 4 2   TOTAL BILIRUBIN mg/dL 0 80   ALK PHOS U/L 89   ALT U/L 15   AST U/L 13   EGFR ml/min/1 73sq m 84   GLUCOSE RANDOM mg/dL 107*         Results from last 7 days   Lab Units 01/09/21  0955   TROPONIN I ng/mL <0 03                 Imaging: I have personally reviewed pertinent films in PACS  Cta Head And Neck With And Without Contrast    Result Date: 1/9/2021  Impression: No acute intracranial pathology  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  Reconstitution of attenuated V1 and V2 segments  Reconstitution of normal caliber V3 segment via collaterals  Subsequent intracranial occlusion of the left V4 segment distal to patent PICA  5 mm distal left PICA aneurysm  Neurovascular consult recommended  No other significant cervical or intracranial stenosis or vessel occlusion  The study was marked in [facility] for immediate notification  Workstation performed: [id number]       EKG, Pathology, and Other Studies Reviewed on Admission:   EKG  Result Date: 01/09/21  Personally reviewed strips with impression of:  Normal sinus rhythm 66 bpm    Allscripts/ Epic Records Reviewed: Yes    ** Please Note: This note has been constructed using a voice recognition system   **

## 2021-01-09 NOTE — ED PROVIDER NOTES
History  Chief Complaint   Patient presents with    Dizziness       70-year-old female presents emergency department complaining of episodes of dizziness that been ongoing for the last 2 weeks  She reports having a distant history of vertigo however she states this is different because her previous vertigo symptoms were temporary  This dizziness has been persistent  She also complains of left-sided chest pain radiating into the back  She describes a pulsating and throbbing sensation of the head scalp and the heaviness of the eyes  She denies nausea vomiting weakness fatigue  She also reports associated difficulty focusing on close objects with her vision she states that she feels as though she has double vision  She states this is new over the last few weeks as well  Overall she appears well at rest in no acute distress  She denies taking any medications for her symptoms  No known sick exposures or contacts  No recent trauma  Allergies reviewed          Prior to Admission Medications   Prescriptions Last Dose Informant Patient Reported? Taking? Multiple Vitamins-Minerals (ZINC PO)   Yes No   Sig: Take by mouth   cyanocobalamin (VITAMIN B-12) 1000 MCG tablet   Yes No   Sig: Take 1,000 mcg by mouth daily   famotidine (PEPCID) 40 MG tablet   Yes No   Sig: Take 40 mg by mouth daily   metoprolol succinate (TOPROL-XL) 50 mg 24 hr tablet  Self Yes No   Sig: Take 25 mg by mouth daily       Facility-Administered Medications: None       Past Medical History:   Diagnosis Date    GERD (gastroesophageal reflux disease)     Hypertension     Lymphoma ([address] )     Urinary tract bacterial infections        Past Surgical History:   Procedure Laterality Date    ABDOMINAL SURGERY      DILATION AND CURETTAGE, DIAGNOSTIC / THERAPEUTIC      HYSTERECTOMY      TONSILLECTOMY      TUBAL LIGATION         History reviewed  No pertinent family history    I have reviewed and agree with the history as documented  E-Cigarette/Vaping    E-Cigarette Use Never User      E-Cigarette/Vaping Substances     Social History     Tobacco Use    Smoking status: Former Smoker     Types: Cigarettes     Quit date: 4/15/2007     Years since quittin 7    Smokeless tobacco: Never Used   Substance Use Topics    Alcohol use: Yes     Frequency: Monthly or less     Comment: social    Drug use: Never       Review of Systems   Constitutional: Negative for chills, fatigue and fever  HENT: Negative for congestion, ear pain, rhinorrhea, sinus pressure, sneezing, sore throat and trouble swallowing  Eyes: Negative for discharge and itching  Respiratory: Negative for cough, chest tightness, shortness of breath, wheezing and stridor  Cardiovascular: Positive for chest pain  Negative for palpitations  Gastrointestinal: Negative for abdominal pain, diarrhea, nausea and vomiting  Neurological: Positive for dizziness  Negative for syncope, numbness and headaches  All other systems reviewed and are negative  Physical Exam  Physical Exam  Vitals signs and nursing note reviewed  Constitutional:       General: She is not in acute distress  Appearance: Normal appearance  She is well-developed  She is not ill-appearing or diaphoretic  HENT:      Head: Normocephalic and atraumatic  Right Ear: External ear normal       Left Ear: External ear normal       Nose: Nose normal    Eyes:      Extraocular Movements: Extraocular movements intact  Left eye: Abnormal extraocular motion present  Conjunctiva/sclera: Conjunctivae normal       Pupils: Pupils are equal, round, and reactive to light  Comments:   Limited accommodation   Neck:      Musculoskeletal: Normal range of motion  Cardiovascular:      Rate and Rhythm: Normal rate and regular rhythm  Heart sounds: Normal heart sounds  No murmur  No friction rub  No gallop      Pulmonary:      Effort: Pulmonary effort is normal  No respiratory distress  Breath sounds: Normal breath sounds  No stridor  No wheezing or rales  Abdominal:      General: Bowel sounds are normal  There is no distension  Palpations: Abdomen is soft  Tenderness: There is no abdominal tenderness  There is no guarding  Musculoskeletal: Normal range of motion  General: No tenderness  Skin:     General: Skin is warm  Capillary Refill: Capillary refill takes less than 2 seconds  Neurological:      Mental Status: She is alert and oriented to person, place, and time  GCS: GCS eye subscore is 4  GCS verbal subscore is 5  GCS motor subscore is 6  Sensory: Sensation is intact  Motor: Motor function is intact  No weakness, tremor, atrophy, abnormal muscle tone or pronator drift  Coordination: Coordination is intact  Romberg sign negative  Coordination normal  Finger-Nose-Finger Test and Heel to [facility] Test normal       Gait: Gait is intact           Vital Signs  ED Triage Vitals [01/09/21 0920]   Temperature Pulse Respirations Blood Pressure SpO2   98 4 °F (36 9 °C) 77 18 (!) 229/103 98 %      Temp Source Heart Rate Source Patient Position - Orthostatic VS BP Location FiO2 (%)   Temporal Monitor Sitting Left arm --      Pain Score       1           Vitals:    01/09/21 1415 01/09/21 1430 01/09/21 1515 01/09/21 1602   BP: (!) 201/82 (!) 219/96  (!) 180/81   Pulse: 65 61 59 61   Patient Position - Orthostatic VS:    Sitting         Visual Acuity      ED Medications  Medications   sodium chloride 0 9 % bolus 1,000 mL (0 mL Intravenous Stopped 1/9/21 1421)   Labetalol HCl (NORMODYNE) injection 10 mg (10 mg Intravenous Given 1/9/21 1002)   iohexol (OMNIPAQUE) 350 MG/ML injection (SINGLE-DOSE) 85 mL (85 mL Intravenous Given 1/9/21 1158)   Labetalol HCl (NORMODYNE) injection 10 mg (10 mg Intravenous Given 1/9/21 1418)   aspirin tablet 325 mg (325 mg Oral Given 1/9/21 1417)   clopidogrel (PLAVIX) tablet 300 mg (300 mg Oral Given 1/9/21 1417) Diagnostic Studies  Results Reviewed     Procedure Component Value Units Date/Time    B-Type Natriuretic Peptide [facility] ONLY) [[id number]]  (Normal) Collected: 01/09/21 0955    Lab Status: Final result Specimen: Blood from Arm, Right Updated: 01/09/21 1029     BNP 53 pg/mL     Troponin I [[id number]]  (Normal) Collected: 01/09/21 0955    Lab Status: Final result Specimen: Blood from Arm, Right Updated: 01/09/21 1027     Troponin I <0 03 ng/mL     Comprehensive metabolic panel [[id number]]  (Abnormal) Collected: 01/09/21 0955    Lab Status: Final result Specimen: Blood from Arm, Right Updated: 01/09/21 1024     Sodium 140 mmol/L      Potassium 3 7 mmol/L      Chloride 106 mmol/L      CO2 28 mmol/L      ANION GAP 6 mmol/L      BUN 17 mg/dL      Creatinine 0 76 mg/dL      Glucose 107 mg/dL      Calcium 9 0 mg/dL      AST 13 U/L      ALT 15 U/L      Alkaline Phosphatase 89 U/L      Total Protein 6 7 g/dL      Albumin 4 2 g/dL      Total Bilirubin 0 80 mg/dL      eGFR 84 ml/min/1 73sq m     Narrative:      [place] guidelines for Chronic Kidney Disease (CKD):     Stage 1 with normal or high GFR (GFR > 90 mL/min/1 73 square meters)    Stage 2 Mild CKD (GFR = 60-89 mL/min/1 73 square meters)    Stage 3A Moderate CKD (GFR = 45-59 mL/min/1 73 square meters)    Stage 3B Moderate CKD (GFR = 30-44 mL/min/1 73 square meters)    Stage 4 Severe CKD (GFR = 15-29 mL/min/1 73 square meters)    Stage 5 End Stage CKD (GFR <15 mL/min/1 73 square meters)  Note: GFR calculation is accurate only with a steady state creatinine    CBC and differential [[id number]]  (Normal) Collected: 01/09/21 0955    Lab Status: Final result Specimen: Blood from Arm, Right Updated: 01/09/21 1005     WBC 5 90 Thousand/uL      RBC 4 97 Million/uL      Hemoglobin 15 2 g/dL      Hematocrit 46 2 %      MCV 93 fL      MCH 30 6 pg      MCHC 32 9 g/dL      RDW 12 8 %      MPV 8 7 fL      Platelets 959 Thousands/uL Neutrophils Relative 70 %      Lymphocytes Relative 22 %      Monocytes Relative 6 %      Eosinophils Relative 1 %      Basophils Relative 1 %      Neutrophils Absolute 4 10 Thousands/µL      Lymphocytes Absolute 1 30 Thousands/µL      Monocytes Absolute 0 40 Thousand/µL      Eosinophils Absolute 0 10 Thousand/µL      Basophils Absolute 0 00 Thousands/µL     Fingerstick Glucose (POCT) [[id number]]  (Normal) Collected: 01/09/21 0943    Lab Status: Final result Updated: 01/09/21 0944     POC Glucose 109 mg/dl                  CTA head and neck with and without contrast   Final Result by Gerhardt Mcburney, MD (01/09 1248)   No acute intracranial pathology  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  Reconstitution of attenuated V1 and V2 segments  Reconstitution of normal caliber V3 segment via collaterals  Subsequent intracranial occlusion of the left V4 segment distal to patent PICA  5 mm distal left PICA aneurysm  Neurovascular consult recommended  No other significant cervical or intracranial stenosis or vessel occlusion  The study was marked in [facility] for immediate notification  Workstation performed: [id number]         XR chest 1 view portable    (Results Pending)              Procedures  Procedures         ED Course  ED Course as of Jan 09 1630   Sat Jan 09, 2021   1349  Dr Yasmeen Malagon requests MRI MRA       [id number]   Per Dr Kyle Sanders of neuro says gradual BP redux to normotension, load with ASA and plavix  MDM  Number of Diagnoses or Management Options  CVA (cerebral vascular accident) [facility]): new and requires workup  Dizziness: established and worsening  Vertebral artery occlusion: new and requires workup  Diagnosis management comments:  Case discussed with neurology Dr Kyle Sanders and neurovascular Dr Yasmeen Malagon  Patient to be admitted for stroke pathway  Per Neurology patient's blood pressure will be controlled 2 normotensive    Patient be started on aspirin and Plavix  MRI to be obtained  Dr Brittany Callahan recommends MRI as well to evaluate the vascular aneurysm  Patient is agreeable to this plan  Amount and/or Complexity of Data Reviewed  Clinical lab tests: reviewed and ordered  Tests in the radiology section of CPT®: ordered and reviewed    Risk of Complications, Morbidity, and/or Mortality  Presenting problems: moderate  Diagnostic procedures: low  Management options: low    Patient Progress  Patient progress: stable      Disposition  Final diagnoses:   Dizziness   Vertebral artery occlusion   CVA (cerebral vascular accident) ([facility] [address] )   Brain aneurysm     Time reflects when diagnosis was documented in both MDM as applicable and the Disposition within this note     Time User Action Codes Description Comment    1/9/2021  2:15 PM Hemant Rex Add [R42] Dizziness     1/9/2021  2:15 PM Hemant Rex Add [I65 09] Vertebral artery occlusion     1/9/2021  2:15 PM Hemant Rex Add [I63 9] CVA (cerebral vascular accident) ([facility] [address] )     1/9/2021  3:37 PM Hemant Rex Add [I67 1] Brain aneurysm       ED Disposition     ED Disposition Condition Date/Time Comment    Admit Stable Sat Jan 9, 2021  2:17 PM Case was discussed with Dr Celeste Chacko and the patient's admission status was agreed to be Admission Status: inpatient status to the service of Dr Celeste Chacko   Follow-up Information    None         Current Discharge Medication List      CONTINUE these medications which have NOT CHANGED    Details   cyanocobalamin (VITAMIN B-12) 1000 MCG tablet Take 1,000 mcg by mouth daily      famotidine (PEPCID) 40 MG tablet Take 40 mg by mouth daily      metoprolol succinate (TOPROL-XL) 50 mg 24 hr tablet Take 25 mg by mouth daily       Multiple Vitamins-Minerals (ZINC PO) Take by mouth           No discharge procedures on file      PDMP Review     None          ED Provider  Electronically Signed by           Evan Contreras PA-C  01/09/21 8679

## 2021-01-09 NOTE — PLAN OF CARE
Problem: Potential for Falls  Goal: Patient will remain free of falls  Description: INTERVENTIONS:  - Assess patient frequently for physical needs  -  Identify cognitive and physical deficits and behaviors that affect risk of falls  -  Sabine fall precautions as indicated by assessment   - Educate patient/family on patient safety including physical limitations  - Instruct patient to call for assistance with activity based on assessment  - Modify environment to reduce risk of injury  - Consider OT/PT consult to assist with strengthening/mobility  Outcome: Progressing     Problem: NEUROSENSORY - ADULT  Goal: Achieves stable or improved neurological status  Description: INTERVENTIONS  - Monitor and report changes in neurological status  - Monitor vital signs such as temperature, blood pressure, glucose, and any other labs ordered   - Initiate measures to prevent increased intracranial pressure  - Monitor for seizure activity and implement precautions if appropriate      Outcome: Progressing  Goal: Achieves maximal functionality and self care  Description: INTERVENTIONS  - Monitor swallowing and airway patency with patient fatigue and changes in neurological status  - Encourage and assist patient to increase activity and self care     - Encourage visually impaired, hearing impaired and aphasic patients to use assistive/communication devices  Outcome: Progressing     Problem: MUSCULOSKELETAL - ADULT  Goal: Maintain or return mobility to safest level of function  Description: INTERVENTIONS:  - Assess patient's ability to carry out ADLs; assess patient's baseline for ADL function and identify physical deficits which impact ability to perform ADLs (bathing, care of mouth/teeth, toileting, grooming, dressing, etc )  - Assess/evaluate cause of self-care deficits   - Assess range of motion  - Assess patient's mobility  - Assess patient's need for assistive devices and provide as appropriate  - Encourage maximum independence but intervene and supervise when necessary  - Involve family in performance of ADLs  - Assess for home care needs following discharge   - Consider OT consult to assist with ADL evaluation and planning for discharge  - Provide patient education as appropriate  Outcome: Progressing

## 2021-01-09 NOTE — ASSESSMENT & PLAN NOTE
· Dizziness for last 12 months but worsened over last several days  · Due to vertebral artery abnormality noted on CTA admission was requested for stroke pathway/evaluation for VBI  · Recommendation by neurosurgeon was MRI/MRI with neurology consultation    · Other differentials for stroke-like symptoms includes vertigo vs hypertensive encephalopathy as vertebral artery findings may have been chronic in nature  · Have PT/OT evaluate for discharge needs

## 2021-01-10 LAB
ALBUMIN SERPL BCP-MCNC: 3.9 G/DL (ref 3.5–5.7)
ALP SERPL-CCNC: 78 U/L (ref 55–165)
ALT SERPL W P-5'-P-CCNC: 12 U/L (ref 7–52)
ANION GAP SERPL CALCULATED.3IONS-SCNC: 11 MMOL/L (ref 4–13)
AST SERPL W P-5'-P-CCNC: 11 U/L (ref 13–39)
ATRIAL RATE: 66 BPM
BILIRUB SERPL-MCNC: 1 MG/DL (ref 0.2–1)
BUN SERPL-MCNC: 12 MG/DL (ref 7–25)
CALCIUM SERPL-MCNC: 8.8 MG/DL (ref 8.6–10.5)
CHLORIDE SERPL-SCNC: 105 MMOL/L (ref 98–107)
CHOLEST SERPL-MCNC: 200 MG/DL (ref 0–200)
CO2 SERPL-SCNC: 25 MMOL/L (ref 21–31)
CREAT SERPL-MCNC: 0.68 MG/DL (ref 0.6–1.2)
ERYTHROCYTE [DISTWIDTH] IN BLOOD BY AUTOMATED COUNT: 12.7 % (ref 11.5–14.5)
EST. AVERAGE GLUCOSE BLD GHB EST-MCNC: 120 MG/DL
GFR SERPL CREATININE-BSD FRML MDRD: 94 ML/MIN/1.73SQ M
GLUCOSE SERPL-MCNC: 97 MG/DL (ref 65–99)
HBA1C MFR BLD: 5.8 %
HCT VFR BLD AUTO: 43.9 % (ref 42–47)
HCYS SERPL-SCNC: 6.8 UMOL/L (ref 3.7–11.2)
HDLC SERPL-MCNC: 40 MG/DL
HGB BLD-MCNC: 14.9 G/DL (ref 12–16)
LDLC SERPL CALC-MCNC: 137 MG/DL (ref 0–100)
MCH RBC QN AUTO: 31.1 PG (ref 26–34)
MCHC RBC AUTO-ENTMCNC: 33.8 G/DL (ref 31–37)
MCV RBC AUTO: 92 FL (ref 81–99)
P AXIS: 36 DEGREES
PLATELET # BLD AUTO: 217 THOUSANDS/UL (ref 149–390)
PMV BLD AUTO: 9 FL (ref 8.6–11.7)
POTASSIUM SERPL-SCNC: 3.8 MMOL/L (ref 3.5–5.5)
PR INTERVAL: 172 MS
PROT SERPL-MCNC: 6 G/DL (ref 6.4–8.9)
QRS AXIS: 27 DEGREES
QRSD INTERVAL: 86 MS
QT INTERVAL: 406 MS
QTC INTERVAL: 425 MS
RBC # BLD AUTO: 4.77 MILLION/UL (ref 3.9–5.2)
SODIUM SERPL-SCNC: 141 MMOL/L (ref 134–143)
T WAVE AXIS: 45 DEGREES
TRIGL SERPL-MCNC: 116 MG/DL (ref 44–166)
VENTRICULAR RATE: 66 BPM
WBC # BLD AUTO: 5.7 THOUSAND/UL (ref 4.8–10.8)

## 2021-01-10 PROCEDURE — 83036 HEMOGLOBIN GLYCOSYLATED A1C: CPT | Performed by: INTERNAL MEDICINE

## 2021-01-10 PROCEDURE — 85027 COMPLETE CBC AUTOMATED: CPT | Performed by: INTERNAL MEDICINE

## 2021-01-10 PROCEDURE — 97165 OT EVAL LOW COMPLEX 30 MIN: CPT

## 2021-01-10 PROCEDURE — 99232 SBSQ HOSP IP/OBS MODERATE 35: CPT | Performed by: INTERNAL MEDICINE

## 2021-01-10 PROCEDURE — 93010 ELECTROCARDIOGRAM REPORT: CPT | Performed by: INTERNAL MEDICINE

## 2021-01-10 PROCEDURE — 97162 PT EVAL MOD COMPLEX 30 MIN: CPT

## 2021-01-10 PROCEDURE — 80053 COMPREHEN METABOLIC PANEL: CPT | Performed by: INTERNAL MEDICINE

## 2021-01-10 PROCEDURE — 80061 LIPID PANEL: CPT | Performed by: INTERNAL MEDICINE

## 2021-01-10 RX ORDER — MECLIZINE HCL 12.5 MG/1
50 TABLET ORAL EVERY 8 HOURS PRN
Status: DISCONTINUED | OUTPATIENT
Start: 2021-01-10 | End: 2021-01-11 | Stop reason: HOSPADM

## 2021-01-10 RX ADMIN — CLOPIDOGREL BISULFATE 75 MG: 75 TABLET ORAL at 09:17

## 2021-01-10 RX ADMIN — FAMOTIDINE 20 MG: 20 TABLET, FILM COATED ORAL at 17:29

## 2021-01-10 RX ADMIN — Medication 1 TABLET: at 09:17

## 2021-01-10 RX ADMIN — ACETAMINOPHEN 650 MG: 325 TABLET ORAL at 02:11

## 2021-01-10 RX ADMIN — ENOXAPARIN SODIUM 40 MG: 40 INJECTION SUBCUTANEOUS at 09:17

## 2021-01-10 RX ADMIN — ATORVASTATIN CALCIUM 40 MG: 40 TABLET, FILM COATED ORAL at 17:29

## 2021-01-10 RX ADMIN — FAMOTIDINE 20 MG: 20 TABLET, FILM COATED ORAL at 09:16

## 2021-01-10 RX ADMIN — MECLIZINE 50 MG: 12.5 TABLET ORAL at 02:29

## 2021-01-10 RX ADMIN — NIFEDIPINE 30 MG: 30 TABLET, FILM COATED, EXTENDED RELEASE ORAL at 09:16

## 2021-01-10 RX ADMIN — CYANOCOBALAMIN TAB 500 MCG 1000 MCG: 500 TAB at 09:17

## 2021-01-10 RX ADMIN — ONDANSETRON 4 MG: 2 INJECTION INTRAMUSCULAR; INTRAVENOUS at 02:11

## 2021-01-10 RX ADMIN — METOPROLOL SUCCINATE 25 MG: 25 TABLET, EXTENDED RELEASE ORAL at 09:16

## 2021-01-10 NOTE — OCCUPATIONAL THERAPY NOTE
Occupational Therapy Evaluation      Gala Starch Cutting    1/10/2021    Patient Active Problem List   Diagnosis    GERD (gastroesophageal reflux disease)    Hypertension    Stroke-like symptoms    Intracranial aneurysm       Past Medical History:   Diagnosis Date    GERD (gastroesophageal reflux disease)     Hypertension     Urinary tract bacterial infections        Past Surgical History:   Procedure Laterality Date    ABDOMINAL SURGERY      DILATION AND CURETTAGE, DIAGNOSTIC / THERAPEUTIC      HYSTERECTOMY      TONSILLECTOMY      TUBAL LIGATION          01/10/21 0817   OT Last Visit   OT Visit Date 01/10/21   Note Type   Note type Evaluation   Restrictions/Precautions   Weight Bearing Precautions Per Order No   Other Precautions Telemetry   Pain Assessment   Pain Assessment Tool Pain Assessment not indicated - pt denies pain   Pain Score No Pain   Home Living   Type of Home House   Home Layout Two level;Performs ADLs on one level; Able to live on main level with bedroom/bathroom;Stairs to enter with rails  (2 NITHYA)   Bathroom Shower/Tub Walk-in shower   Bathroom Toilet Standard   Bathroom Equipment Grab bars in shower;Grab bars around toilet   [address] Walker;Cane  (no AD used at baseline )   Prior Function   Level of [place] Independent with ADLs and functional mobility   Lives With Spouse   ADL Assistance Independent   IADLs Independent   Falls in the last 6 months 0   Vocational Full time employment   Comments (+) driving    Lifestyle   Autonomy Patient reporting being independent with ADLs/IADLs, ambulatory with no AD and  in a two story house with 2 NITHYA   Reciprocal Relationships supportive     Service to Others works full-time at [employer] run    [place] enjoys working with children    ADL   Eating Assistance 7  Independent   Grooming Assistance 7  [address] [facility] [place] 6  [address] [facility] Assistance 5  Supervision/Setup   UB Dressing Assistance 6  Modified independent   LB Dressing Assistance 5  Supervision/Setup   Toileting Assistance  6  Modified independent   Bed Mobility   Additional Comments DNT bed mobility: pt seated at EOB upon arrival to room and at end of eval    Transfers   Sit to Stand 6  Modified independent   Additional items Bedrails   Stand to Sit 6  Modified independent   Additional items Bedrails   Functional Mobility   Functional Mobility 5  Supervision   Additional Comments Pt ambulated in hallway with no LOB or SOB    Additional items   (pt ambulated with no AD )   Balance   Static Sitting Normal   Dynamic Sitting Normal   Static Standing Normal   Dynamic Standing Good   Activity Tolerance   Activity Tolerance Patient tolerated treatment well   Nurse Made Aware RN made aware of outcomes    RUE Assessment   RUE Assessment WFL   LUE Assessment   LUE Assessment WFL   Hand Function   Gross Motor Coordination Functional   Fine Motor Coordination Functional   Sensation   Light Touch No apparent deficits  (per pt )   Vision-Basic Assessment   Current Vision Wears glasses only for reading  (and for driving )   Patient Visual Report   (vision is at baseline per pt )   Vision - Complex Assessment   Acuity Able to read clock/calendar on wall without difficulty; Able to read employee name badge without difficulty   Cognition   Overall Cognitive Status [facility]   Arousal/Participation Alert; Responsive; Cooperative   Attention Within functional limits   Orientation Level Oriented X4   Memory Within functional limits   Following Commands Follows all commands and directions without difficulty   Comments Pt agreeable to OT eval    Assessment   Prognosis Good   Assessment Pt is a 56 y o  female who was admitted to [address] [place] [address] on 1/9/2021 with Stroke-like symptoms  At this time, patient is also affected by the comorbidities of: GERD, HTN, and intracranial aneurysm   Additionally, patient is affected by the following personal factors:steps to enter environment  Orders placed for OT evaluation/treatment with up and OOB as tolerated orders  Prior to admission, Patient reporting being independent with ADLs/IADLs, ambulatory with no AD and  in a two story house with 2 NITHYA  Upon OT evaluation, patient requires modified independent  for UB ADLs and supervision/set-up for LB ADLs  Patient presents with functional use of BUEs, with intact prehension and fine motor coordination, and symmetrical muscle strength  Patient appears to be functioning at baseline, no further Acute OT needs identified at this time to warrant OT services  D/C OT and from OT standpoint, recommendation at time of d/c would be return to previous environment with social support     Goals   Patient Goals to go home    Recommendation   OT Discharge Recommendation Return to previous environment with social support   OT - OK to Discharge Yes  (Once medically cleared )   Barthel Index   Feeding 10   Bathing 5   Grooming Score 5   Dressing Score 10   Bladder Score 10   Bowels Score 10   Toilet Use Score 10   Transfers (Bed/Chair) Score 10   Mobility (Level Surface) Score 10   Stairs Score 0   Barthel Index Score 80     Oren Check, OT

## 2021-01-10 NOTE — PROGRESS NOTES
Progress Note - Prakash Linn 1960, 56 y o  female MRN: [id number]    Unit/Bed#: -01 Encounter: [id number]    Primary Care Provider: Pierre Kilpatrick DO   Date and time admitted to hospital: 1/9/2021  9:19 AM        * Stroke-like symptoms  Assessment & Plan  · Dizziness for last 12 months but worsened over last several days  · Due to vertebral artery abnormality noted on CTA, admission was requested for stroke pathway/evaluation for VBI  · Other differentials for stroke-like symptoms includes vertigo vs hypertensive encephalopathy as vertebral artery findings may have been chronic in nature  · Symptoms much improved today  May have been secondary to elevated blood pressures  · Awaiting neurologic consultation and MRI/MRA as recommended by neurosurgery    Hypertension  Assessment & Plan  · Essential hypertension currently on metoprolol 25 mg daily  · HCTZ was added by PCP however patient was worried about her sulfa allergy and did not get this prescription filled  · Started nifedipine with much improvement blood pressures and neurologic symptoms  Intracranial aneurysm  Assessment & Plan  · Incidental finding of 5 mm distal left PICA  Had neurosurgical consultation during ED visit  Should continue to follow-up with NSG after discharge    GERD (gastroesophageal reflux disease)  Assessment & Plan  · GERD continue famotidine      VTE Pharmacologic Prophylaxis: VTE Score: 7 High Risk (Score >/= 5) - Pharmacological DVT Prophylaxis Ordered: Enoxaparin (Lovenox)  Sequential Compression Devices Ordered  Patient Centered Rounds: I have performed bedside rounds with nursing staff today  Discussions with Specialists or Other Care Team Provider:     Education and Discussions with Family / Patient:  Attempted to call contact James Frame however number is not in service    Time Spent for Care: 25 mins  More than 50% of total time spent on counseling and coordination of care as described above      Current Length of Stay: 1 day(s)  Current Patient Status: Inpatient   Certification Statement: The patient will continue to require additional inpatient hospital stay due to awaiting neurologic consultation and MRI  Discharge Plan / Estimated Discharge Date: Anticipate discharge tomorrow to home  Code Status: Level 1 - Full Code      Subjective:   Patient seen and examined  Feeling much better today  No further paresthesias of any extremities  Still low bit vertiginous  Minimal headache  Objective:   Vitals: Blood pressure 113/62, pulse 73, temperature 97 9 °F (36 6 °C), temperature source Tympanic, resp  rate 20, height 5' 3" (1 6 m), weight 79 9 kg (176 lb 2 4 oz), SpO2 98 %  Physical Exam  Vitals signs reviewed  Constitutional:       General: She is not in acute distress  Appearance: Normal appearance  HENT:      Head: Atraumatic  Eyes:      General: No scleral icterus  Extraocular Movements: Extraocular movements intact  Cardiovascular:      Rate and Rhythm: Regular rhythm  Heart sounds: Normal heart sounds  Pulmonary:      Breath sounds: Normal breath sounds  No wheezing  Abdominal:      General: Bowel sounds are normal       Palpations: Abdomen is soft  Tenderness: There is no guarding or rebound  Musculoskeletal:         General: No swelling  Skin:     General: Skin is warm  Neurological:      Mental Status: She is alert and oriented to person, place, and time  Mental status is at baseline     Psychiatric:         Mood and Affect: Mood normal        Additional Data:   Labs:  Results from last 7 days   Lab Units 01/10/21  0458 01/09/21  0955   WBC Thousand/uL 5 70 5 90   HEMOGLOBIN g/dL 14 9 15 2   HEMATOCRIT % 43 9 46 2   MCV fL 92 93   PLATELETS Thousands/uL 217 225     Results from last 7 days   Lab Units 01/10/21  0458 01/09/21  0955   SODIUM mmol/L 141 140   POTASSIUM mmol/L 3 8 3 7   CHLORIDE mmol/L 105 106   CO2 mmol/L 25 28   ANION GAP mmol/L 11 6   BUN mg/dL 12 17   CREATININE mg/dL 0 68 0 76   CALCIUM mg/dL 8 8 9 0   ALBUMIN g/dL 3 9 4 2   TOTAL BILIRUBIN mg/dL 1 00 0 80   ALK PHOS U/L 78 89   ALT U/L 12 15   AST U/L 11* 13   EGFR ml/min/1 73sq m 94 84   GLUCOSE RANDOM mg/dL 97 107*         Results from last 7 days   Lab Units 01/09/21  0955   TROPONIN I ng/mL <0 03              Results from last 7 days   Lab Units 01/09/21  0943   POC GLUCOSE mg/dl 109     Results from last 7 days   Lab Units 01/10/21  0458   HEMOGLOBIN A1C % 5 8*     Results from last 7 days   Lab Units 01/09/21  0955   TSH 3RD GENERATON uIU/mL 1 480     * I Have Reviewed All Lab Data Listed Above  Cultures:                   Lines/Drains:  Invasive Devices     Peripheral Intravenous Line            Peripheral IV 01/09/21 Right Antecubital 1 day              Telemetry:   Telemetry Orders (From admission, onward)             48 Hour Telemetry Monitoring  Continuous x 48 hours     Question:  Reason for 48 Hour Telemetry  Answer:  Acute CVA (<24 hrs old, hemispheric strokes, selected brainstem strokes, cardiac arrhythmias)                  Imaging:  Imaging Reports Reviewed Today Include:   Cta Head And Neck With And Without Contrast    Result Date: 1/9/2021  Impression: No acute intracranial pathology  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  Reconstitution of attenuated V1 and V2 segments  Reconstitution of normal caliber V3 segment via collaterals  Subsequent intracranial occlusion of the left V4 segment distal to patent PICA  5 mm distal left PICA aneurysm  Neurovascular consult recommended  No other significant cervical or intracranial stenosis or vessel occlusion  The study was marked in [facility] for immediate notification  Workstation performed: [id number]     Xr Chest 1 View Portable    Result Date: 1/9/2021  Impression: No acute cardiopulmonary disease   Workstation performed: [id number]     Scheduled Meds:  Current Facility-Administered Medications   Medication Dose Route Frequency Provider Last Rate    acetaminophen  650 mg Oral Q6H PRN Eloy Stade, DO      atorvastatin  40 mg Oral QPM Eloy Stade, DO      clopidogrel  75 mg Oral Daily Eloy Stade, DO      cyanocobalamin  1,000 mcg Oral Daily Elyo Stade, DO      enoxaparin  40 mg Subcutaneous Daily Tian Abdiel, DO      famotidine  20 mg Oral BID Tian Meadows, DO      hydrALAZINE  10 mg Intravenous Q4H PRN Eloy Stade, DO      LORazepam  1 mg Intravenous Once PRN Eloy Stade, DO      meclizine  50 mg Oral Q8H PRN Yvonne Joseph PA-C      metoprolol succinate  25 mg Oral Daily Eloy Stade, DO      multivitamin-minerals  1 tablet Oral Daily Eloy Stade, DO      NIFEdipine  30 mg Oral Daily Eloy Stade, DO      ondansetron  4 mg Intravenous Q4H PRN Eloy Stade, DO         Eloy Stade, DO  [facility] Internal Medicine  Hospitalist    ** Please Note: This note has been constructed using a voice recognition system   **

## 2021-01-10 NOTE — ASSESSMENT & PLAN NOTE
· Essential hypertension currently on metoprolol 25 mg daily  · HCTZ was added by PCP however patient was worried about her sulfa allergy and did not get this prescription filled  · Started nifedipine with much improvement blood pressures and neurologic symptoms

## 2021-01-10 NOTE — PLAN OF CARE
Problem: Potential for Falls  Goal: Patient will remain free of falls  Description: INTERVENTIONS:  - Assess patient frequently for physical needs  -  Identify cognitive and physical deficits and behaviors that affect risk of falls  -  [place] fall precautions as indicated by assessment   - Educate patient/family on patient safety including physical limitations  - Instruct patient to call for assistance with activity based on assessment  - Modify environment to reduce risk of injury  - Consider OT/PT consult to assist with strengthening/mobility  Outcome: Progressing     Problem: NEUROSENSORY - ADULT  Goal: Achieves stable or improved neurological status  Description: INTERVENTIONS  - Monitor and report changes in neurological status  - Monitor vital signs such as temperature, blood pressure, glucose, and any other labs ordered   - Initiate measures to prevent increased intracranial pressure  - Monitor for seizure activity and implement precautions if appropriate      Outcome: Progressing  Goal: Achieves maximal functionality and self care  Description: INTERVENTIONS  - Monitor swallowing and airway patency with patient fatigue and changes in neurological status  - Encourage and assist patient to increase activity and self care     - Encourage visually impaired, hearing impaired and aphasic patients to use assistive/communication devices  Outcome: Progressing     Problem: CARDIOVASCULAR - ADULT  Goal: Maintains optimal cardiac output and hemodynamic stability  Description: INTERVENTIONS:  - Monitor I/O, vital signs and rhythm  - Monitor for S/S and trends of decreased cardiac output  - Administer and titrate ordered vasoactive medications to optimize hemodynamic stability  - Assess quality of pulses, skin color and temperature  - Assess for signs of decreased coronary artery perfusion  - Instruct patient to report change in severity of symptoms  Outcome: Progressing  Goal: Absence of cardiac dysrhythmias or at baseline rhythm  Description: INTERVENTIONS:  - Continuous cardiac monitoring, vital signs, obtain 12 lead EKG if ordered  - Administer antiarrhythmic and heart rate control medications as ordered  - Monitor electrolytes and administer replacement therapy as ordered  Outcome: Progressing     Problem: MUSCULOSKELETAL - ADULT  Goal: Maintain or return mobility to safest level of function  Description: INTERVENTIONS:  - Assess patient's ability to carry out ADLs; assess patient's baseline for ADL function and identify physical deficits which impact ability to perform ADLs (bathing, care of mouth/teeth, toileting, grooming, dressing, etc )  - Assess/evaluate cause of self-care deficits   - Assess range of motion  - Assess patient's mobility  - Assess patient's need for assistive devices and provide as appropriate  - Encourage maximum independence but intervene and supervise when necessary  - Involve family in performance of ADLs  - Assess for home care needs following discharge   - Consider OT consult to assist with ADL evaluation and planning for discharge  - Provide patient education as appropriate  Outcome: Progressing     Problem: PAIN - ADULT  Goal: Verbalizes/displays adequate comfort level or baseline comfort level  Description: Interventions:  - Encourage patient to monitor pain and request assistance  - Assess pain using appropriate pain scale  - Administer analgesics based on type and severity of pain and evaluate response  - Implement non-pharmacological measures as appropriate and evaluate response  - Consider cultural and social influences on pain and pain management  - Notify physician/advanced practitioner if interventions unsuccessful or patient reports new pain  Outcome: Progressing     Problem: INFECTION - ADULT  Goal: Absence or prevention of progression during hospitalization  Description: INTERVENTIONS:  - Assess and monitor for signs and symptoms of infection  - Monitor lab/diagnostic results  - Monitor all insertion sites, i e  indwelling lines, tubes, and drains  - Monitor endotracheal if appropriate and nasal secretions for changes in amount and color  - [place] appropriate cooling/warming therapies per order  - Administer medications as ordered  - Instruct and encourage patient and family to use good hand hygiene technique  - Identify and instruct in appropriate isolation precautions for identified infection/condition  Outcome: Progressing     Problem: SAFETY ADULT  Goal: Patient will remain free of falls  Description: INTERVENTIONS:  - Assess patient frequently for physical needs  -  Identify cognitive and physical deficits and behaviors that affect risk of falls    -  [place] fall precautions as indicated by assessment   - Educate patient/family on patient safety including physical limitations  - Instruct patient to call for assistance with activity based on assessment  - Modify environment to reduce risk of injury  - Consider OT/PT consult to assist with strengthening/mobility  Outcome: Progressing  Goal: Maintain or return to baseline ADL function  Description: INTERVENTIONS:  -  Assess patient's ability to carry out ADLs; assess patient's baseline for ADL function and identify physical deficits which impact ability to perform ADLs (bathing, care of mouth/teeth, toileting, grooming, dressing, etc )  - Assess/evaluate cause of self-care deficits   - Assess range of motion  - Assess patient's mobility; develop plan if impaired  - Assess patient's need for assistive devices and provide as appropriate  - Encourage maximum independence but intervene and supervise when necessary  - Involve family in performance of ADLs  - Assess for home care needs following discharge   - Consider OT consult to assist with ADL evaluation and planning for discharge  - Provide patient education as appropriate  Outcome: Progressing  Goal: Maintain or return mobility status to optimal level  Description: INTERVENTIONS:  - Assess patient's baseline mobility status (ambulation, transfers, stairs, etc )    - Identify cognitive and physical deficits and behaviors that affect mobility  - Identify mobility aids required to assist with transfers and/or ambulation (gait belt, sit-to-stand, lift, walker, cane, etc )  - [place] fall precautions as indicated by assessment  - Record patient progress and toleration of activity level on Mobility SBAR; progress patient to next Phase/Stage  - Instruct patient to call for assistance with activity based on assessment  - Consider rehabilitation consult to assist with strengthening/weightbearing, etc   Outcome: Progressing     Problem: DISCHARGE PLANNING  Goal: Discharge to home or other facility with appropriate resources  Description: INTERVENTIONS:  - Identify barriers to discharge w/patient and caregiver  - Arrange for needed discharge resources and transportation as appropriate  - Identify discharge learning needs (meds, wound care, etc )  - Arrange for interpretive services to assist at discharge as needed  - Refer to Case Management Department for coordinating discharge planning if the patient needs post-hospital services based on physician/advanced practitioner order or complex needs related to functional status, cognitive ability, or social support system  Outcome: Progressing     Problem: Knowledge Deficit  Goal: Patient/family/caregiver demonstrates understanding of disease process, treatment plan, medications, and discharge instructions  Description: Complete learning assessment and assess knowledge base    Interventions:  - Provide teaching at level of understanding  - Provide teaching via preferred learning methods  Outcome: Progressing

## 2021-01-10 NOTE — PLAN OF CARE
Problem: PHYSICAL THERAPY ADULT  Goal: Performs mobility at highest level of function for planned discharge setting  See evaluation for individualized goals  Description: Treatment/Interventions: Functional transfer training, LE strengthening/ROM, Therapeutic exercise, Gait training, Spoke to nursing          See flowsheet documentation for full assessment, interventions and recommendations  Note: Prognosis: Good  Problem List: Impaired balance, Decreased safety awareness  Assessment: Pt is 56 y o  female seen for PT evaluation s/p admit to [address] [facility] on 1/9/2021 w/ Stroke-like symptoms  PT consulted to assess pt's functional mobility and d/c needs  Order placed for PT eval and tx, w/ up and OOB as tolerated order  Comorbidities affecting pt's physical performance at time of assessment include: stroke-like symptoms, GERD,HTN,intracranial aneurysm  PTA, pt was independent w/ all functional mobility w/ o AD usage  Personal factors affecting pt at time of IE include: stairs to enter home, inability to navigate community distances, unable to perform dynamic tasks in community and inability to perform IADLs  Please find objective findings from PT assessment regarding body systems outlined above with impairments and limitations including impaired balance, gait deviations, decreased safety awareness and fall risk  The following objective measures performed on IE also reveal limitations: Barthel Index: 80/100  Pt's clinical presentation is currently evolving seen in pt's presentation of ongoing telemetry monitoring, impulsivity,sudden symptom onset PTA  Pt to benefit from continued PT tx to address deficits as defined above and maximize level of functional independent mobility and consistency  From PT/mobility standpoint, recommendation at time of d/c would be anticipate no needs pending progress in order to facilitate return to PLOF          PT Discharge Recommendation: Return to previous environment with no needs     PT - OK to Discharge: Yes(if medically stable, from functional perspective)    See flowsheet documentation for full assessment

## 2021-01-10 NOTE — ASSESSMENT & PLAN NOTE
· Dizziness for last 12 months but worsened over last several days  · Due to vertebral artery abnormality noted on CTA, admission was requested for stroke pathway/evaluation for VBI  · Other differentials for stroke-like symptoms includes vertigo vs hypertensive encephalopathy as vertebral artery findings may have been chronic in nature  · Symptoms much improved today  May have been secondary to elevated blood pressures    · Awaiting neurologic consultation and MRI/MRA as recommended by neurosurgery

## 2021-01-10 NOTE — PHYSICAL THERAPY NOTE
Physical Therapy Evaluation     Patient's Name: Eliud Linn    Admitting Diagnosis  Vertebral artery occlusion [I65 09]  Dizziness [R42]  Brain aneurysm [I67 1]  CVA (cerebral vascular accident) [facility]) [I63 9]    Problem List  Patient Active Problem List   Diagnosis    GERD (gastroesophageal reflux disease)    Hypertension    Stroke-like symptoms    Intracranial aneurysm       Past Medical History  Past Medical History:   Diagnosis Date    GERD (gastroesophageal reflux disease)     Hypertension     Urinary tract bacterial infections        Past Surgical History  Past Surgical History:   Procedure Laterality Date    ABDOMINAL SURGERY      DILATION AND CURETTAGE, DIAGNOSTIC / THERAPEUTIC      HYSTERECTOMY      TONSILLECTOMY      TUBAL LIGATION          01/10/21 0808   PT Last Visit   PT Visit Date 01/10/21   Note Type   Note type Evaluation   Pain Assessment   Pain Assessment Tool Pain Assessment not indicated - pt denies pain   Pain Score No Pain  (reports her vision has "cleared up")   Home Living   Type of [address] [place] [address] Two level;Performs ADLs on one level; Able to live on main level with bedroom/bathroom;Stairs to enter with rails  (2 NITHYA)   Bathroom Shower/Tub Walk-in shower   Bathroom Toilet Standard   Bathroom Equipment Grab bars in shower;Grab bars around toilet   [address] Walker;Cane  (did not utilize PTA)   Prior Function   Level of Lewis Independent with ADLs and functional mobility   Lives With Spouse   ADL Assistance Independent   IADLs Independent   Falls in the last 6 months 0   Vocational Full time employment   Restrictions/Precautions   Weight Bearing Precautions Per Order No   Other Precautions Fall Risk;Telemetry   General   Family/Caregiver Present No   Cognition   Overall Cognitive Status WFL   Arousal/Participation Alert   Orientation Level Oriented X4   Memory Within functional limits   Following Commands Follows all commands and directions without difficulty   Comments Pt  agreeable to PT assessment, pleasant  RUE Assessment   RUE Assessment WFL   LUE Assessment   LUE Assessment WFL   RLE Assessment   RLE Assessment X  (4-/5 gross musculature)   LLE Assessment   LLE Assessment X  (4-/5 gross musculature)   Coordination   Movements are Fluid and Coordinated 1   Light Touch   RLE Light Touch Grossly intact   LLE Light Touch Grossly intact   Sharp/Dull   RLE Sharp/Dull Grossly intact   LLE Sharp/Dull Grossly intact   Bed Mobility   Additional Comments DNT as pt  was sitting on EOB prior/after session  Transfers   Sit to Stand 6  Modified independent   Additional items Bedrails   Stand to Sit 6  Modified independent   Additional items Bedrails   Stand pivot 5  Supervision   Additional items Assist x 1;Verbal cues   Ambulation/Elevation   Gait pattern Short stride; Inconsistent marilyn   Gait Assistance 5  Supervision  (distant)   Additional items Assist x 1   Assistive Device None   Distance 275 feet   Stair Management Assistance Not tested   Balance   Static Sitting Normal   Dynamic Sitting Normal   Static Standing Normal   Dynamic Standing Good   Ambulatory Good   Endurance Deficit   Endurance Deficit No   Activity Tolerance   Activity Tolerance Patient tolerated treatment well   Nurse Made Aware Yes, nursing staff was informed of assessment outcome  Assessment   Prognosis Good   Problem List Impaired balance;Decreased safety awareness   Assessment Pt is 56 y o  female seen for PT evaluation s/p admit to [address] [place] [address] on 1/9/2021 w/ Stroke-like symptoms  PT consulted to assess pt's functional mobility and d/c needs  Order placed for PT eval and tx, w/ up and OOB as tolerated order  Comorbidities affecting pt's physical performance at time of assessment include: stroke-like symptoms, GERD,HTN,intracranial aneurysm  PTA, pt was independent w/ all functional mobility w/ o AD usage   Personal factors affecting pt at time of IE include: stairs to enter home, inability to navigate community distances, unable to perform dynamic tasks in community and inability to perform IADLs  Please find objective findings from PT assessment regarding body systems outlined above with impairments and limitations including impaired balance, gait deviations, decreased safety awareness and fall risk  The following objective measures performed on IE also reveal limitations: Barthel Index: 80/100  Pt's clinical presentation is currently evolving seen in pt's presentation of ongoing telemetry monitoring, impulsivity,sudden symptom onset PTA  Pt to benefit from continued PT tx to address deficits as defined above and maximize level of functional independent mobility and consistency  From PT/mobility standpoint, recommendation at time of d/c would be anticipate no needs pending progress in order to facilitate return to PLOF  Goals   Patient Goals to go home ASAP   STG Expiration Date 01/13/21   Short Term Goal #1 Patient will complete transfers independently w/o LOB to prevent falls and resume PLOF  BLE strength to greater than/equal to 4+/5 gross musculature to increase ability to safely transfer, control descent to chair  Patient will exhibit increase dynamic standing balance to Normal, for 5-7 minutes without LOB independently to improve endurance  Patient will exhibit increase dynamic ambulatory balance to Good for 400-500 feet w/o AD independently to improve ability to mobilize to toilet, chair and decrease risk for additional medical complications  Patient will exhibit good self monitoring and ability to follow 2 step commands to increase complexity of tasks and resume ADL's without LOB  PT Treatment Day 0   Plan   Treatment/Interventions Functional transfer training;LE strengthening/ROM; Therapeutic exercise;Gait training;Spoke to nursing   PT Frequency Follow-up visit only   Recommendation   PT Discharge Recommendation Return to previous environment with no needs   PT - OK to Discharge Yes  (if medically stable, from functional perspective)   Additional Comments Upon conclusion, pt  was resting on bedside w/all needs within reach     Barthel Index   Feeding 10   Bathing 5   Grooming Score 5   Dressing Score 10   Bladder Score 10   Bowels Score 10   Toilet Use Score 10   Transfers (Bed/Chair) Score 10   Mobility (Level Surface) Score 10   Stairs Score 0   Barthel Index Score 80     Perfecto Boas, PT

## 2021-01-10 NOTE — UTILIZATION REVIEW
Initial Clinical Review    Admission: Date/Time/Statement:   Admission Orders (From admission, onward)     Ordered        01/09/21 1417  Inpatient Admission  Once                   Orders Placed This Encounter   Procedures    Inpatient Admission     Standing Status:   Standing     Number of Occurrences:   1     Order Specific Question:   Admitting Physician     Answer:   Emile Sauceda [[id number]]     Order Specific Question:   Level of Care     Answer:   Med Surg [[id number]]     Order Specific Question:   Estimated length of stay     Answer:   More than 2 Midnights     Order Specific Question:   Certification     Answer:   I certify that inpatient services are medically necessary for this patient for a duration of greater than two midnights  See H&P and MD Progress Notes for additional information about the patient's course of treatment  ED Arrival Information     Expected Arrival Acuity Means of Arrival Escorted By Service Admission Type    - 1/9/2021 09:10 Urgent Walk-In Spouse General Medicine Urgent    Arrival Complaint    dizziness        Chief Complaint   Patient presents with    Dizziness     Assessment/Plan: 57 yo f with apmh of HTN, presents with 12 months of dizziness, vertigo  She reports she has been battling HTN  On metoprolol  25 mg   Her PCP recently added HCTA due to concern for sulfa allergy she never started this med  The past two days she has had worsening ataxia and gait abnormality with a sense of leaning to the right  Along with left sided visual disturbance  She is admitted to inpatient for stoke work up and HTN she is s/p labetalol with mild improvement  Neurosurgery - 1/9 - Telemed consult -  57 yo F with hypertensive emergency, she has 2 weeks of vertigo , dizziness and paraesthesias, head pulsations , diplopia and subjective weakness   Based on imaging studies - plan Stroke evaluation - VBI, MRI/MRA ordered  Need formal evaluation for aneurysm no acute endovascular intervention for occluded left vert       ED Triage Vitals [01/09/21 0920]   Temperature Pulse Respirations Blood Pressure SpO2   98 4 °F (36 9 °C) 77 18 (!) 229/103 98 %      Temp Source Heart Rate Source Patient Position - Orthostatic VS BP Location FiO2 (%)   Temporal Monitor Sitting Left arm --      Pain Score       1          Wt Readings from Last 1 Encounters:   01/09/21 79 9 kg (176 lb 2 4 oz)     Additional Vital Signs:   Date/Time  Temp  Pulse  Resp  BP  MAP (mmHg)  SpO2  O2 Device  Patient Position - Orthostatic VS   01/10/21 1100  97 9 °F (36 6 °C)  73  20  113/62  --  98 %  None (Room air)  Lying   01/10/21 0916  --  74  --  112/62  --  --  --  --   01/10/21 0750  97 1 °F (36 2 °C)Abnormal   62  20  110/58  --  95 %  None (Room air)  Lying   01/10/21 0430  97 5 °F (36 4 °C)  65  18  111/65  --  97 %  None (Room air)  Lying   01/10/21 0230  96 8 °F (36 °C)Abnormal   62  18  128/72  --  96 %  None (Room air)  Lying   01/10/21 0030  97 2 °F (36 2 °C)Abnormal   68  18  152/64  --  96 %  None (Room air)  Lying   01/09/21 2230  97 8 °F (36 6 °C)  61  18  165/80  --  97 %  None (Room air)  Lying   01/09/21 2030  97 2 °F (36 2 °C)Abnormal   67  18  165/84  --  97 %  None (Room air)  Lying   01/09/21 1930  97 °F (36 1 °C)Abnormal   59  18  190/81Abnormal   --  98 %  None (Room air)  Lying   01/09/21 1830  97 6 °F (36 4 °C)  65  18  126/66  --  96 %  None (Room air)  Sitting   01/09/21 1730  --  70  20  163/76  --  98 %  None (Room air)  Sitting   01/09/21 1602  97 1 °F (36 2 °C)Abnormal   61  18  180/81Abnormal   --  99 %  None (Room air)  Sitting   01/09/21 1515  --  59  --  --  --  97 %  --  --   01/09/21 1430  --  61  13  219/96Abnormal   138  97 %  --  --   01/09/21 1415  --  65  17  201/82Abnormal   133  98 %  --  --   01/09/21 1345  --  66  17  181/84Abnormal   121  97 %  --  --   01/09/21 1330  --  67  18  196/83Abnormal   119  99 %  --  --   01/09/21 1315  --  65  20  229/94Abnormal   135  99 %  --  --   01/09/21 1300  -- 61  26Abnormal   176/99Abnormal   131  97 %  --  --   01/09/21 1100  --  58  16  151/83  112  97 %  --  --   01/09/21 1030  --  58  13  182/86Abnormal   123  97 %  --  --   01/09/21 1015  --  58  15  179/94Abnormal   130  98 %  --  --   01/09/21 1000  --  61  13  196/98Abnormal   137  --  --  --   01/09/21 0945  --  --  --  212/111Abnormal   151  --  --         Pertinent Labs/Diagnostic Test Results:       Results from last 7 days   Lab Units 01/10/21  0458 01/09/21  0955   WBC Thousand/uL 5 70 5 90   HEMOGLOBIN g/dL 14 9 15 2   HEMATOCRIT % 43 9 46 2   PLATELETS Thousands/uL 217 225   NEUTROS ABS Thousands/µL  --  4 10         Results from last 7 days   Lab Units 01/10/21  0458 01/09/21  0955   SODIUM mmol/L 141 140   POTASSIUM mmol/L 3 8 3 7   CHLORIDE mmol/L 105 106   CO2 mmol/L 25 28   ANION GAP mmol/L 11 6   BUN mg/dL 12 17   CREATININE mg/dL 0 68 0 76   EGFR ml/min/1 73sq m 94 84   CALCIUM mg/dL 8 8 9 0     Results from last 7 days   Lab Units 01/10/21  0458 01/09/21  0955   AST U/L 11* 13   ALT U/L 12 15   ALK PHOS U/L 78 89   TOTAL PROTEIN g/dL 6 0* 6 7   ALBUMIN g/dL 3 9 4 2   TOTAL BILIRUBIN mg/dL 1 00 0 80     Results from last 7 days   Lab Units 01/09/21  0943   POC GLUCOSE mg/dl 109     Results from last 7 days   Lab Units 01/10/21  0458 01/09/21  0955   GLUCOSE RANDOM mg/dL 97 107*         Results from last 7 days   Lab Units 01/10/21  0458   HEMOGLOBIN A1C % 5 8*   EAG mg/dl 120     Results from last 7 days   Lab Units 01/09/21  0955   TROPONIN I ng/mL <0 03     Results from last 7 days   Lab Units 01/09/21  0955   TSH 3RD GENERATON uIU/mL 1 480     Results from last 7 days   Lab Units 01/09/21  0955   BNP pg/mL 53     Results from last 7 days   Lab Units 01/09/21  0955   CRP mg/L <5 0     CTA Head & Neck - 1/9 - No acute intracranial pathology  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  Reconstitution of attenuated V1 and V2 segments   Reconstitution of normal caliber V3 segment via collaterals  Subsequent intracranial occlusion of the left V4 segment distal to patent PICA  5 mm distal left PICA aneurysm    Neurovascular consult recommended     CXR 1/9 -  No acute          ED Treatment:   Medication Administration from 01/09/2021 0910 to 01/09/2021 1600       Date/Time Order Dose Route Action Comments     01/09/2021 1002 sodium chloride 0 9 % bolus 1,000 mL 1,000 mL Intravenous New Bag      01/09/2021 1002 Labetalol HCl (NORMODYNE) injection 10 mg 10 mg Intravenous Given      01/09/2021 1158 iohexol (OMNIPAQUE) 350 MG/ML injection (SINGLE-DOSE) 85 mL 85 mL Intravenous Given      01/09/2021 1418 Labetalol HCl (NORMODYNE) injection 10 mg 10 mg Intravenous Given      01/09/2021 1417 aspirin tablet 325 mg 325 mg Oral Given      01/09/2021 1417 clopidogrel (PLAVIX) tablet 300 mg 300 mg Oral Given         Past Medical History:   Diagnosis Date    GERD (gastroesophageal reflux disease)     Hypertension     Urinary tract bacterial infections      Present on Admission:   GERD (gastroesophageal reflux disease)   Hypertension   Stroke-like symptoms   Intracranial aneurysm      Admitting Diagnosis: Vertebral artery occlusion [I65 09]  Dizziness [R42]  Brain aneurysm [I67 1]  [facility] [I63 9]  Age/Sex: 56 y o  female         Admission Orders:  Scheduled Medications:  atorvastatin, 40 mg, Oral, QPM  clopidogrel, 75 mg, Oral, Daily  cyanocobalamin, 1,000 mcg, Oral, Daily  enoxaparin, 40 mg, Subcutaneous, Daily  famotidine, 20 mg, Oral, BID  metoprolol succinate, 25 mg, Oral, Daily  multivitamin-minerals, 1 tablet, Oral, Daily  NIFEdipine, 30 mg, Oral, Daily      Continuous IV Infusions:     PRN Meds:  acetaminophen, 650 mg, Oral, Q6H PRN  hydrALAZINE, 10 mg, Intravenous, Q4H PRN  LORazepam, 1 mg, Intravenous, Once PRN  meclizine, 50 mg, Oral, Q8H PRN  ondansetron, 4 mg, Intravenous, Q4H PRN      Nursing Orders -     Network Utilization Review Department  ATTENTION: Please call with any questions or concerns to [phone] and carefully listen to the prompts so that you are directed to the right person  All voicemails are confidential   Chrystine Can all requests for admission clinical reviews, approved or denied determinations and any other requests to dedicated fax number below belonging to the campus where the patient is receiving treatment   List of dedicated fax numbers for the Facilities:  [address] DENIALS (Administrative/Medical Necessity) [phone]   [address] (Maternity/NICU/Pediatrics) [phone]   [address] Carrie Cat 1472 (  Joaquin Mackey "Radha" 103) [address] [place] [address] Shun Morelos 1481 [address] [place] [address] [phone]

## 2021-01-11 ENCOUNTER — APPOINTMENT (INPATIENT)
Dept: MRI IMAGING | Facility: HOSPITAL | Age: 61
DRG: 305 | End: 2021-01-11
Payer: COMMERCIAL

## 2021-01-11 ENCOUNTER — APPOINTMENT (INPATIENT)
Dept: NON INVASIVE DIAGNOSTICS | Facility: HOSPITAL | Age: 61
DRG: 305 | End: 2021-01-11
Payer: COMMERCIAL

## 2021-01-11 VITALS
SYSTOLIC BLOOD PRESSURE: 139 MMHG | BODY MASS INDEX: 31.21 KG/M2 | TEMPERATURE: 98.8 F | DIASTOLIC BLOOD PRESSURE: 74 MMHG | OXYGEN SATURATION: 96 % | HEART RATE: 71 BPM | HEIGHT: 63 IN | WEIGHT: 176.15 LBS | RESPIRATION RATE: 16 BRPM

## 2021-01-11 LAB
ANION GAP SERPL CALCULATED.3IONS-SCNC: 7 MMOL/L (ref 4–13)
BUN SERPL-MCNC: 15 MG/DL (ref 7–25)
CALCIUM SERPL-MCNC: 8.9 MG/DL (ref 8.6–10.5)
CHLORIDE SERPL-SCNC: 107 MMOL/L (ref 98–107)
CO2 SERPL-SCNC: 29 MMOL/L (ref 21–31)
CREAT SERPL-MCNC: 0.88 MG/DL (ref 0.6–1.2)
GFR SERPL CREATININE-BSD FRML MDRD: 71 ML/MIN/1.73SQ M
GLUCOSE SERPL-MCNC: 93 MG/DL (ref 65–99)
POTASSIUM SERPL-SCNC: 4.2 MMOL/L (ref 3.5–5.5)
SODIUM SERPL-SCNC: 143 MMOL/L (ref 134–143)

## 2021-01-11 PROCEDURE — 70551 MRI BRAIN STEM W/O DYE: CPT

## 2021-01-11 PROCEDURE — 93306 TTE W/DOPPLER COMPLETE: CPT | Performed by: INTERNAL MEDICINE

## 2021-01-11 PROCEDURE — 93306 TTE W/DOPPLER COMPLETE: CPT

## 2021-01-11 PROCEDURE — G1004 CDSM NDSC: HCPCS

## 2021-01-11 PROCEDURE — 80048 BASIC METABOLIC PNL TOTAL CA: CPT | Performed by: INTERNAL MEDICINE

## 2021-01-11 PROCEDURE — G0426 INPT/ED TELECONSULT50: HCPCS | Performed by: PSYCHIATRY & NEUROLOGY

## 2021-01-11 PROCEDURE — 70544 MR ANGIOGRAPHY HEAD W/O DYE: CPT

## 2021-01-11 PROCEDURE — 99239 HOSP IP/OBS DSCHRG MGMT >30: CPT | Performed by: NURSE PRACTITIONER

## 2021-01-11 RX ORDER — ATORVASTATIN CALCIUM 40 MG/1
40 TABLET, FILM COATED ORAL EVERY EVENING
Qty: 30 TABLET | Refills: 0 | Status: SHIPPED | OUTPATIENT
Start: 2021-01-11 | End: 2021-04-28

## 2021-01-11 RX ORDER — ASPIRIN 81 MG/1
81 TABLET, CHEWABLE ORAL DAILY
Status: DISCONTINUED | OUTPATIENT
Start: 2021-01-11 | End: 2021-01-11 | Stop reason: HOSPADM

## 2021-01-11 RX ORDER — ASPIRIN 81 MG/1
81 TABLET, CHEWABLE ORAL DAILY
Qty: 30 TABLET | Refills: 0 | Status: SHIPPED | OUTPATIENT
Start: 2021-01-11 | End: 2021-05-24

## 2021-01-11 RX ORDER — NIFEDIPINE 30 MG/1
30 TABLET, FILM COATED, EXTENDED RELEASE ORAL DAILY
Qty: 30 TABLET | Refills: 0 | Status: SHIPPED | OUTPATIENT
Start: 2021-01-12 | End: 2021-01-18

## 2021-01-11 RX ADMIN — FAMOTIDINE 20 MG: 20 TABLET, FILM COATED ORAL at 09:48

## 2021-01-11 RX ADMIN — NIFEDIPINE 30 MG: 30 TABLET, FILM COATED, EXTENDED RELEASE ORAL at 09:48

## 2021-01-11 RX ADMIN — CLOPIDOGREL BISULFATE 75 MG: 75 TABLET ORAL at 09:48

## 2021-01-11 RX ADMIN — Medication 1 TABLET: at 09:48

## 2021-01-11 RX ADMIN — ENOXAPARIN SODIUM 40 MG: 40 INJECTION SUBCUTANEOUS at 09:48

## 2021-01-11 RX ADMIN — CYANOCOBALAMIN TAB 500 MCG 1000 MCG: 500 TAB at 09:48

## 2021-01-11 RX ADMIN — ASPIRIN 81 MG: 81 TABLET, CHEWABLE ORAL at 12:14

## 2021-01-11 RX ADMIN — METOPROLOL SUCCINATE 25 MG: 25 TABLET, EXTENDED RELEASE ORAL at 09:48

## 2021-01-11 NOTE — DISCHARGE INSTRUCTIONS
Neurology     see eye doctor wants to leave hospital   follow-up with Dr Kirby School,  Neurosurgery  For aneurysm   strict blood pressure control, if consistently greater than 140/90 despite taking your medication please let your family doctor know   If any sudden onset of one-sided weakness, facial droop, slurred speech or other speech changes, vision loss, significant dizziness,   explosive headache call 911 and or go to the nearest ER as these can be signs of stroke or bleed   take aspirin 81 mg daily

## 2021-01-11 NOTE — CASE MANAGEMENT
assessment completed, pt was made aware of cm role, pt was made aware she was here as an obs status, pt is independent,works and drives, pt lives with her spouse Cheryl Dee # [phone], she  Lives in a 2 story home with no basement, 12-14 steps inside, br 1st & 2nd floor, 2 steps outside, pt is able to stay on the 1st floor if needed, pt has a handicap a bathroom, no hx of [facility], DME: none, RX plan [facility], pt denies smoking and drinks alcohol on occasion, pt does have a pcp and she would like a doctor closer to home, pt was given the [employer] link card, pt denies any d/c needs, her  will transport pt home, d/c order written, I met with the pt again and she denied any additional d/c needs, pt in agreement with the d/c and d/c plan home with spouse  CM reviewed d/c planning process including the following: identifying help at home, patient preference for d/c planning needs, availability of treatment team to discuss questions or concerns patient and/or family may have regarding understanding medications and recognizing signs and symptoms once discharged  CM also encouraged patient to follow up with all recommended appointments after discharge  Patient advised of importance for patient and family to participate in managing patients medical well being

## 2021-01-11 NOTE — NURSING NOTE
Pt discharged home, vss, AF  Pt denies pain, denies shortness of breath  Discharge instructions read and explained to pt, all questions answered  IV removed without complications and tip intact  Pt escorted to front entrance and assisted into car of

## 2021-01-11 NOTE — INCIDENTAL FINDINGS
The following findings require follow up:  Radiographic finding   Finding: Subsequent intracranial occlusion of the left V4 segment distal to patent PICA  5 mm distal left PICA aneurysm    Neurovascular consult recommended      Follow up required: neurosurgery    Follow up should be done within 6-8 week(s)    Please notify the following clinician to assist with the follow up:   Dr Abdullahi Pathak neurosurgery

## 2021-01-11 NOTE — TELEMEDICINE
TeleConsultation - Neurology   Julia Linn 56 y o  female MRN: [id number]  Unit/Bed#: -01 Encounter: [id number]      REQUIRED DOCUMENTATION:     1  This service was provided via Telemedicine  2  Provider located at [facility] office  3  TeleMed provider: Olga Thomas DO   4  Identify all parties in room with patient during tele consult:  Pt and RN Ciera  5  After connecting through [employer], patient was identified by name and date of birth and assistant checked wristband  Patient was then informed that this was a Telemedicine visit and that the exam was being conducted confidentially over secure lines  My office door was closed  No one else was in the room  Patient acknowledged consent and understanding of privacy and security of the Telemedicine visit, and gave us permission to have the assistant stay in the room in order to assist with the history and to conduct the exam   I informed the patient that I have reviewed their record in Epic and presented the opportunity for them to ask any questions regarding the visit today  The patient agreed to participate  Assessment/Plan     Ms Julia Martinez Pleasant 18-year-old female seen in consultation for symptoms of numbness tingling dizziness and nausea in the setting of malignant hypertension blood pressure 229/103  On admit in the ER  Patient does admit to occasional med noncompliance  Symptoms essentially resolved  Since blood pressure controlled here in the hospital      - MRI brain does not show any acute stroke or bleed  I discussed this with the patient  Reviewed imaging personally in PACS  - CTA/ MRA reviewed  She does have a left distal 5 mm PICA aneurysm    Discuss importance of med compliance and strict blood pressure control   -  Discussed monitoring her blood pressure regularly and if greater than 140/90 consistently to notify her PCP as there is an increased risk of aneurysm rupture and the brain bleed with uncontrolled blood pressures  Patient is not a smoker     - She will follow-up with Dr Rebecca Knight  - For hypoplastic/occluded left vertebral artery, recommend ASA 81 mg daily    Sudden onset of one-sided weakness, facial droop, explosive headache, speech changes, vision loss, significant this is dizziness patient was encouraged to call 911 and or go to the nearest ER as these can be signs of acute stroke or bleed  Did let the patient know okay to have 1 week time off as 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  If she remains asymptomatic as well as her blood pressure under control with medication compliance after clearance from her PCP can return to work  I discussed this with the primary team     Follow up with stroke neurology in 6-8 weeks also  Major Semen Cutting will need follow up in 6-8 weeks with neurovascular attending or advance practitioner  She will not require outpatient neurological testing  History of Present Illness     Reason for Consult / Principal Problem: dizziness  Hx and PE limited by:   Tele consultation  HPI: Major Semen Cutting is a 56 y o   female who presents with dizziness, numbness and tingling in both hands and feet as well as numbness and tingling in the neck as well as vision changes especially with her left eyes for 3 days prior to admit  Patient states she did not have this before  Her blood pressure on admission was elevated 218 /103  States this is not uncommon it can go as high as 364 systolic  She does have a blood pressure cuff at home  Admits to medication noncompliance  She denies any one-sided weakness or speech change or known prior history of stroke or bleed  No known family history of aneurysm  She does not take aspirin at home  She is not a diabetic as far she is aware, no tobacco use, no significant alcohol use,  No clotting history, no known cancer history        Family history of cardiac disease and diabetes  Consults     Review of Systems    Historical Information   Past Medical History:   Diagnosis Date    GERD (gastroesophageal reflux disease)     Hypertension     Urinary tract bacterial infections      Past Surgical History:   Procedure Laterality Date    ABDOMINAL SURGERY      DILATION AND CURETTAGE, DIAGNOSTIC / THERAPEUTIC      HYSTERECTOMY      TONSILLECTOMY      TUBAL LIGATION       Social History   Social History     Substance and Sexual Activity   Alcohol Use Yes    Frequency: Monthly or less    Comment: social     Social History     Substance and Sexual Activity   Drug Use Never     E-Cigarette/Vaping    E-Cigarette Use Never User      E-Cigarette/Vaping Substances     Social History     Tobacco Use   Smoking Status Former Smoker    Types: Cigarettes    Quit date: 4/15/2007    Years since quittin 7   Smokeless Tobacco Never Used     Family History: as above    Review of previous medical records was  completed  Meds/Allergies   all current active meds have been reviewed    Allergies   Allergen Reactions    Sulfa Antibiotics Other (See Comments) and Rash     Massive headache       Objective   Vitals:Blood pressure 147/74, pulse 68, temperature 98 7 °F (37 1 °C), temperature source Temporal, resp  rate 16, height 5' 3" (1 6 m), weight 79 9 kg (176 lb 2 4 oz), SpO2 95 %  ,Body mass index is 31 2 kg/m²  No intake or output data in the 24 hours ending 21 1141    Invasive Devices:    Invasive Devices     Peripheral Intravenous Line            Peripheral IV 21 Right Antecubital 2 days                Physical Exam  Neurologic Exam    Gen: NAD  HEENT: NCAT, EOMI  Resp: Symmetric chest rise bl  Skin: no rashes on visualized portion of exam  Ext: no edema    AO X 4, no aphasia dysarthria or naming or repetition impairment  CN 2-12 grossly intact  Motor: 5/5 ext x 4  Sensation: Intact to LT x 4  Cerebellar: No dysmetria b/l  Gait: normal range  Romberg with no sway    Lab Results: I have personally reviewed pertinent reports  Imaging Studies: I have personally reviewed pertinent films in PACS  EKG, Pathology, and Other Studies: I have personally reviewed pertinent films in PACS      Code Status: Level 1 - Full Code    Counseling / Coordination of Care  Total time spent today 35 minutes  Greater than 50% of total time was spent with the patient and / or family counseling and / or coordination of care   A description of the counseling / coordination of care: as detailed above

## 2021-01-11 NOTE — ASSESSMENT & PLAN NOTE
· currently on metoprolol 25 mg daily  · HCTZ was added by PCP however patient was worried about her sulfa allergy and did not get this prescription filled  · Started nifedipine with much improvement blood pressures and neurologic symptoms

## 2021-01-11 NOTE — DISCHARGE INSTR - AVS FIRST PAGE
Dear Johnson Linn,     It was our pleasure to care for you here at [facility]  It is our hope that we were always able to exceed the expected standards for your care during your stay  You were hospitalized due to dizziness  You were cared for on the 1st floor by CHRISTOPHER Ba with the [facility] who covers for your primary care physician (PCP), Charito Costa DO, while you were hospitalized  If you have any questions or concerns related to this hospitalization, you may contact us at [phone]  For follow up as well as any medication refills, we recommend that you follow up with your primary care physician  A registered nurse will reach out to you by phone within a few days after your discharge to answer any additional questions that you may have after going home  However, at this time we provide for you here, the most important instructions / recommendations at discharge:     · Notable Medication Adjustments -   · Aspirin 81 mg daily  · nifedipine 30 mg daily- blood pressure medication  · Atorvastatin 40 mg daily- cholesterol medication  · Testing Required after Discharge -   · None  · Important follow up information -   · Follow-up PCP  · Follow-up with Neurosurgery  · Follow-up with Neurology  · Get evaluated by Ophthalmology  · Other Instructions -   · Please refer to discharge instruction  · Please review this entire after visit summary as additional general instructions including medication list, appointments, activity, diet, any pertinent wound care, and other additional recommendations from your care team that may be provided for you        Sincerely,     CHRISTOPHER Ba

## 2021-01-11 NOTE — ASSESSMENT & PLAN NOTE
· Dizziness for last 12 months but worsened over last several days  · Suspected that symptoms are related to uncontrolled hypertension   · Symptoms much improved  · Neurology input appreciated   · MRI brain does not show any acute strokes or bleed  · Neurology is okay for the patient to be discharged and follow-up with Stroke Neurology in 6-8 weeks as well as neurovascular    · Recommend opthalmology evaluation for visual disturbances  · Started on ASA 81 mg

## 2021-01-11 NOTE — ASSESSMENT & PLAN NOTE
· Incidental finding of 5 mm distal left PICA  Had neurosurgical consultation during ED visit  · Discussed importance of well-controlled BP with patient and medication compliance   · Advised to start blood pressure diary and monitor if the value greater than 140/90 consistently to get in touch with PCP

## 2021-01-11 NOTE — DISCHARGE SUMMARY
Discharge- Funmilayo Castaneda Cutting 1960, 56 y o  female MRN: [id number]    Unit/Bed#: -01 Encounter: [id number]    Primary Care Provider: Shawn Larson DO   Date and time admitted to hospital: 1/9/2021  9:19 AM        * Stroke-like symptoms  Assessment & Plan  · Dizziness for last 12 months but worsened over last several days  · Suspected that symptoms are related to uncontrolled hypertension   · Symptoms much improved  · Neurology input appreciated   · MRI brain does not show any acute strokes or bleed  · Neurology is okay for the patient to be discharged and follow-up with Stroke Neurology in 6-8 weeks as well as neurovascular  · Recommend opthalmology evaluation for visual disturbances  · Started on ASA 81 mg       Intracranial aneurysm  Assessment & Plan  · Incidental finding of 5 mm distal left PICA  Had neurosurgical consultation during ED visit  · Discussed importance of well-controlled BP with patient and medication compliance   · Advised to start blood pressure diary and monitor if the value greater than 140/90 consistently to get in touch with PCP  Hypertension  Assessment & Plan  · currently on metoprolol 25 mg daily  · HCTZ was added by PCP however patient was worried about her sulfa allergy and did not get this prescription filled  · Started nifedipine with much improvement blood pressures and neurologic symptoms  GERD (gastroesophageal reflux disease)  Assessment & Plan  · continue famotidine          Discharging Physician / Practitioner: Lambert Redman, [facility]  PCP: Shawn Larson DO  Admission Date:   Admission Orders (From admission, onward)     Ordered        01/09/21 1417  Inpatient Admission  Once                   Discharge Date: 01/11/21    Resolved Problems  Date Reviewed: 1/11/2021    None          Consultations During Hospital Stay:  · Neurology    Procedures Performed:   · None    Significant Findings / Test Results:   · MRI brain No acute ischemia  Scattered white matter change suggestive of chronic microangiopathy  There is a focus of chronic hemosiderin deposition within the posterior fossa along the left posterior lateral aspect of the 4th ventricle with imaging characteristics suggestive of cavernous angioma  · MRA head No flow related enhancement of the left V4 segment   The vessel does   reconstitute distally at the level of the vertebrobasilar junction  Fetal origin of the left posterior cerebral artery  The left posterior inferior cerebellar artery does demonstrate flow   related enhancement and there is a focal dilated vessel with adjacent   hemosiderin identified within the left cerebellum at the distal aspect of   the PICA suggestive of aneurysm, measuring    5 mm  Findings are stable compared to CT angiography performed 2 days ago  · CTA head and neck No acute intracranial pathology  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  Reconstitution of attenuated V1 and V2 segments  Reconstitution of normal caliber V3 segment via collaterals  Subsequent intracranial occlusion of the left V4 segment distal to patent PICA  5 mm distal left PICA aneurysm    Neurovascular consult recommended  No other significant cervical or intracranial stenosis or vessel occlusion  The study was marked in [facility] for immediate notification  · CXR- no acute finding    Incidental Findings:   · None     Test Results Pending at Discharge (will require follow up): · None     Outpatient Tests Requested:  · Follow-up PCP  · Follow-up with Stroke Neurology  · Follow-up with Neurosurgery  · Follow-up with ophthalmologist    Complications:     None    Reason for Admission:  Dizziness    Hospital Course:     Dalton Linn is a 56 y o  female patient who originally presented to the hospital on 1/9/2021 due to dizziness  Please refer to H and P for initial presenting complaint    Brief the patient presented with complaint of worsening dizziness over the past few days prior to admission and subsequently was admitted for stroke-like symptoms rule out acute CVA  The patient was noted to have elevated blood pressure on admission as high as systolic lead in the 760G  She was not taking her medication that was prescribed by her PCP due to potential side effects of the medications  The patient was placed under stroke pathway  Diagnostic testing including CT/CTA head and neck and MRI/MRAs results noted above  Neurology evaluation was done  At this time there is no acute ischemic or bleeding noted on the MRI  There was an incidental finding of a 5 mm aneurysm that the patient will need to follow up with neurovascular surgery  The patient was started on nifedipine on admission with much improvement of her blood pressure  The patient will need to start a blood pressure diary to keep blood pressure less than 140/90  After long discussions with her regarding better control of her blood pressure with finding of aneurysm  Additionally the patient also reports some visual disturbances and at this time neurology recommends that the patient follow-up with Ophthalmology as an outpatient for further evaluation  Suspected that her stroke-like symptoms on admission is likely related to poorly-controlled/hypertensive urgency  Overall clinically the patient is much improved  I advised the patient to change positions slowly and stay hydrated  She is medically cleared to be discharged home today  Please see above list of diagnoses and related plan for additional information  Condition at Discharge: good     Discharge Day Visit / Exam:     Subjective:  Feeling much improved since admission  Continues to have some visual disturbances on right eye     Vitals: Blood Pressure: 147/74 (01/11/21 1100)  Pulse: 68 (01/11/21 1100)  Temperature: 98 7 °F (37 1 °C) (01/11/21 1100)  Temp Source: Temporal (01/11/21 1100)  Respirations: 16 (01/11/21 1100)  Height: 5' 3" (160 cm) (01/09/21 1602)  Weight - Scale: 79 9 kg (176 lb 2 4 oz) (01/09/21 1602)  SpO2: 95 % (01/11/21 1100)  Exam:   Physical Exam  Vitals signs and nursing note reviewed  Constitutional:       General: She is not in acute distress  Appearance: Normal appearance  HENT:      Head: Normocephalic and atraumatic  Right Ear: External ear normal       Left Ear: External ear normal       Nose: Nose normal  No rhinorrhea  Mouth/Throat:      Mouth: Mucous membranes are moist       Pharynx: Oropharynx is clear  Eyes:      General:         Right eye: No discharge  Left eye: No discharge  Pupils: Pupils are equal, round, and reactive to light  Neck:      Musculoskeletal: Normal range of motion and neck supple  No muscular tenderness  Cardiovascular:      Rate and Rhythm: Normal rate and regular rhythm  Pulses: Normal pulses  Heart sounds: Normal heart sounds  No murmur  Pulmonary:      Effort: Pulmonary effort is normal  No respiratory distress  Breath sounds: Normal breath sounds  Abdominal:      General: Bowel sounds are normal  There is no distension  Palpations: Abdomen is soft  There is no mass  Tenderness: There is no abdominal tenderness  Musculoskeletal: Normal range of motion  General: No swelling or tenderness  Skin:     General: Skin is warm and dry  Capillary Refill: Capillary refill takes less than 2 seconds  Findings: No erythema or rash  Neurological:      General: No focal deficit present  Mental Status: She is alert and oriented to person, place, and time  Mental status is at baseline  Psychiatric:         Mood and Affect: Mood normal          Behavior: Behavior normal          Thought Content:  Thought content normal          Judgment: Judgment normal          Discussion with Family: none present during exam     Discharge instructions/Information to patient and family:   See after visit summary for information provided to patient and family  Provisions for Follow-Up Care:  See after visit summary for information related to follow-up care and any pertinent home health orders  Disposition:     Home      Planned Readmission:    No      Discharge Statement:  I spent 38 minutes discharging the patient  This time was spent on the day of discharge  I had direct contact with the patient on the day of discharge  Greater than 50% of the total time was spent examining patient, answering all patient questions, arranging and discussing plan of care with patient as well as directly providing post-discharge instructions  Additional time then spent on discharge activities  Discharge Medications:  See after visit summary for reconciled discharge medications provided to patient and family        ** Please Note: This note has been constructed using a voice recognition system **

## 2021-01-18 ENCOUNTER — OFFICE VISIT (OUTPATIENT)
Dept: NEUROSURGERY | Facility: CLINIC | Age: 61
End: 2021-01-18
Payer: COMMERCIAL

## 2021-01-18 VITALS
RESPIRATION RATE: 16 BRPM | HEIGHT: 63 IN | TEMPERATURE: 97.7 F | SYSTOLIC BLOOD PRESSURE: 156 MMHG | DIASTOLIC BLOOD PRESSURE: 90 MMHG | WEIGHT: 176 LBS | BODY MASS INDEX: 31.18 KG/M2 | HEART RATE: 62 BPM

## 2021-01-18 DIAGNOSIS — R29.90 STROKE-LIKE SYMPTOMS: ICD-10-CM

## 2021-01-18 DIAGNOSIS — I67.1 INTRACRANIAL ANEURYSM: Primary | ICD-10-CM

## 2021-01-18 PROCEDURE — 99204 OFFICE O/P NEW MOD 45 MIN: CPT | Performed by: NEUROLOGICAL SURGERY

## 2021-01-18 RX ORDER — AMLODIPINE BESYLATE 5 MG/1
5 TABLET ORAL DAILY
COMMUNITY

## 2021-01-18 NOTE — PROGRESS NOTES
Patient Id: Jae Linn is a 56 y o  female        Handedness: Right      Assessment/Plan:    Diagnoses and all orders for this visit:    Intracranial aneurysm  -     Ambulatory referral to Neurosurgery  -     IR cerebral angiography; Future    Stroke-like symptoms  -     IR cerebral angiography; Future      Discussion Summary:   1  Distal PICA aneurysm? We discussed the natural history and diagnosis of intracranial aneurysms as well as subarachnoid hemorrhage  We discussed the risk of aneurysmal rupture and subarachnoid hemorrhage based on size and location  Given the size of this aneurysm her risk of hemorrhage would be approximately 0 5% per year according to [address] [place] [address] Street  We also discussed the signs and symptoms of subarachnoid hemorrhage reasons to present to the emergency room  Given the unusual location of this aneurysm as well as her symptoms, prior episode in 2011, and prior imaging I would like to obtain a formal diagnostic cerebral arteriogram to better elucidate the angio architecture of her posterior circulation and this aneurysm  We discussed the risks of this procedure including bleeding, vascular injury, bruising, and stroke  She has elected to proceed  She signed consent today  We will do this at her earliest convenience  2  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  We discussed that this may represent old dissection or nondominant vertebral artery  It is clearly chronic given reconstitution through collateral circulation  Given the lack of MRI changes on her inpatient stay I do not believe that this is an acute phenomenon  That said I do not believe remaining aspirin 80 mg is a reasonable  We discussed the risks of this in the context of her aneurysm  3  Hypertension  We discussed the importance of control of her blood pressure  I would like her blood pressure goal to be less than 140         Chief Complaint: Consult and Aneurysm      HPI:   This is a very pleasant 53-year-old female who presented to the emergency room with a sensation that her scalp was throbbing and hair hurt  She also had some dizziness with nausea as well as pins and needles in her hands and feet  She felt like her legs were heavy and her balance was poor  She states that a similar episode happened several years ago  In addition in 2011 she had an episode with significant nausea and vomiting and diarrhea where she passed out  She went to the emergency room and was admitted to the hospital at that time  I reviewed a CT scan from that admission which demonstrates a hyperdensity in the same area of her more recently found aneurysm  It is possible that this represented hemorrhage versus the aneurysm itself  Her workup at that time was ultimately negative  She has had similar episodes this last before, most significantly a couple years ago  She states that she still does have intermittent diplopia that occurs almost daily  She states that she has some neck stiffness  Otherwise her other symptoms including numbness and tingling scalp throbbing, leg heaviness, and sensation of pins and needles has resolved  Her past medical history significant for hypertension, hyperlipidemia  Her past surgical history significant for total hysterectomy with bladder injury  She states that she has no medical allergies  Sulfa is listed as an allergy but she states that this is not a true allergy but poorly tolerated  She is   She has full children ages 39, 45, 36, and 43  She is employed as a  at [employer]  She quit smoking in 2007  She denies any significant alcohol usage  She denies any illicit drug usage  No family history of subarachnoid hemorrhage or sudden death  Review of systems obtained by the MA reviewed and updated below  Review of Systems   Constitutional: Negative  HENT: Negative  Eyes: Positive for visual disturbance (floater in left eye)  Respiratory: Negative  Cardiovascular: Negative  Gastrointestinal: Negative  Endocrine: Negative  Genitourinary: Negative  Musculoskeletal: Positive for gait problem (nothing recently)  Negative for back pain, myalgias and neck pain  Skin: Negative  Allergic/Immunologic: Negative  Neurological: Positive for dizziness (occa), weakness (legs are tired) and numbness (in her feet and hands)  Negative for tremors, seizures, speech difficulty, light-headedness and headaches  Hematological: Bruises/bleeds easily (asa81)  Psychiatric/Behavioral: Negative  Physical Exam  Vitals:    01/18/21 1155   BP: 156/90   Pulse: 62   Resp: 16   Temp: 97 7 °F (36 5 °C)   She is well appearing  Affect is appropriate  Body mass index is 31 18 kg/m²  [facility] She is awake alert and oriented  Hearing and vision are grossly intact  Her pupils are equal round reactive to light  Her extraocular movements are intact  Her face is symmetric  Tongue is midline  Facial sensation is intact and symmetric throughout  Shoulder shrug is 5/5  There is no drift or dysmetria  She has full strength in her bilateral upper and lower extremities  She has normal muscle tone muscle bulk  Her biceps reflexes and patellar reflexes are 2+ and symmetric  Hailee sign negative bilaterally  Sensation intact to light touch and pinprick throughout  Her gait is normal      Her heart rate is regular  Her breath sounds are clear    2+ radial pulses       The following portions of the patient's history were reviewed and updated as appropriate: allergies, current medications, past family history, past medical history, past social history, past surgical history and problem list     Active Ambulatory Problems     Diagnosis Date Noted    GERD (gastroesophageal reflux disease)     Hypertension     Stroke-like symptoms 01/09/2021    Intracranial aneurysm 01/09/2021     Resolved Ambulatory Problems     Diagnosis Date Noted    No Resolved Ambulatory Problems     Past Medical History:   Diagnosis Date    Urinary tract bacterial infections        Past Surgical History:   Procedure Laterality Date    ABDOMINAL SURGERY      DILATION AND CURETTAGE, DIAGNOSTIC / THERAPEUTIC      HYSTERECTOMY      TONSILLECTOMY      TUBAL LIGATION           Current Outpatient Medications:     amLODIPine (NORVASC) 5 mg tablet, Take 5 mg by mouth daily, Disp: , Rfl:     aspirin 81 mg chewable tablet, Chew 1 tablet (81 mg total) daily, Disp: 30 tablet, Rfl: 0    atorvastatin (LIPITOR) 40 mg tablet, Take 1 tablet (40 mg total) by mouth every evening, Disp: 30 tablet, Rfl: 0    cyanocobalamin (VITAMIN B-12) 1000 MCG tablet, Take 1,000 mcg by mouth daily, Disp: , Rfl:     famotidine (PEPCID) 40 MG tablet, Take 40 mg by mouth daily, Disp: , Rfl:     metoprolol succinate (TOPROL-XL) 50 mg 24 hr tablet, Take 25 mg by mouth daily , Disp: , Rfl:     Multiple Vitamins-Minerals (MULTIVITAMIN ADULTS 50+ PO), Take 1 tablet by mouth daily, Disp: , Rfl:     Multiple Vitamins-Minerals (ZINC PO), Take 1 tablet by mouth daily , Disp: , Rfl:     Results/Data:  Reviewed her imaging in detail as well as the report

## 2021-01-22 DIAGNOSIS — R29.90 STROKE-LIKE SYMPTOMS: ICD-10-CM

## 2021-01-22 DIAGNOSIS — I67.1 INTRACRANIAL ANEURYSM: Primary | ICD-10-CM

## 2021-01-29 ENCOUNTER — TELEPHONE (OUTPATIENT)
Dept: RADIOLOGY | Facility: HOSPITAL | Age: 61
End: 2021-01-29

## 2021-01-29 RX ORDER — SODIUM CHLORIDE 9 MG/ML
75 INJECTION, SOLUTION INTRAVENOUS CONTINUOUS
Status: CANCELLED | OUTPATIENT
Start: 2021-01-29

## 2021-01-29 NOTE — PRE-PROCEDURE INSTRUCTIONS
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)  Told patient she could go to work the next day and that I would check with Dr Danette Baron in case she should need any kind of light duty for a few days  Patient expressed concern that she would mess something up in her head after the procedure  Explained that we were only taking pictures of her head during diagnostic cerebral so there would not be anything she could "mess up" from us  Assured her the greatest risk was bleeding from the artery which Dr uses closure device to prevent  All questions answered  TigerTexted Dr Danette Baron asking if patient would need light duty note for work for a few days after procedure and he said she should be fine unless she wants some time  Called Hussein Sawyer back that she should be able to return to work the following day without restrictions per Dr Danette Baron  She verbalized understanding and had no further questions

## 2021-02-03 ENCOUNTER — LAB (OUTPATIENT)
Dept: LAB | Facility: CLINIC | Age: 61
End: 2021-02-03
Payer: COMMERCIAL

## 2021-02-03 ENCOUNTER — TRANSCRIBE ORDERS (OUTPATIENT)
Dept: LAB | Facility: CLINIC | Age: 61
End: 2021-02-03

## 2021-02-03 DIAGNOSIS — I67.1 INTRACRANIAL ANEURYSM: ICD-10-CM

## 2021-02-03 DIAGNOSIS — R29.90 STROKE-LIKE SYMPTOMS: ICD-10-CM

## 2021-02-03 LAB
APTT PPP: 31 SECONDS (ref 23–37)
INR PPP: 0.98 (ref 0.84–1.19)
PROTHROMBIN TIME: 13 SECONDS (ref 11.6–14.5)

## 2021-02-03 PROCEDURE — 85730 THROMBOPLASTIN TIME PARTIAL: CPT

## 2021-02-03 PROCEDURE — 85610 PROTHROMBIN TIME: CPT

## 2021-02-03 PROCEDURE — 36415 COLL VENOUS BLD VENIPUNCTURE: CPT

## 2021-02-04 ENCOUNTER — ANESTHESIA EVENT (OUTPATIENT)
Dept: RADIOLOGY | Facility: HOSPITAL | Age: 61
End: 2021-02-04
Payer: COMMERCIAL

## 2021-02-04 ENCOUNTER — TELEPHONE (OUTPATIENT)
Dept: INPATIENT UNIT | Facility: HOSPITAL | Age: 61
End: 2021-02-04

## 2021-02-05 ENCOUNTER — HOSPITAL ENCOUNTER (OUTPATIENT)
Dept: RADIOLOGY | Facility: HOSPITAL | Age: 61
Discharge: HOME | End: 2021-02-05
Attending: NEUROLOGICAL SURGERY | Admitting: NEUROLOGICAL SURGERY
Payer: COMMERCIAL

## 2021-02-05 ENCOUNTER — ANESTHESIA (OUTPATIENT)
Dept: RADIOLOGY | Facility: HOSPITAL | Age: 61
End: 2021-02-05
Payer: COMMERCIAL

## 2021-02-05 VITALS
SYSTOLIC BLOOD PRESSURE: 92 MMHG | TEMPERATURE: 98.2 F | DIASTOLIC BLOOD PRESSURE: 53 MMHG | RESPIRATION RATE: 17 BRPM | HEART RATE: 57 BPM | OXYGEN SATURATION: 97 %

## 2021-02-05 VITALS — HEART RATE: 63 BPM

## 2021-02-05 DIAGNOSIS — R29.90 STROKE-LIKE SYMPTOMS: ICD-10-CM

## 2021-02-05 DIAGNOSIS — I67.1 INTRACRANIAL ANEURYSM: ICD-10-CM

## 2021-02-05 PROCEDURE — C1760 CLOSURE DEV, VASC: HCPCS

## 2021-02-05 PROCEDURE — C1894 INTRO/SHEATH, NON-LASER: HCPCS

## 2021-02-05 PROCEDURE — 36228 PLACE CATH INTRACRANIAL ART: CPT | Performed by: NEUROLOGICAL SURGERY

## 2021-02-05 PROCEDURE — C1769 GUIDE WIRE: HCPCS

## 2021-02-05 PROCEDURE — 36216 PLACE CATHETER IN ARTERY: CPT | Performed by: NEUROLOGICAL SURGERY

## 2021-02-05 PROCEDURE — 36224 PLACE CATH CAROTD ART: CPT

## 2021-02-05 PROCEDURE — 75774 ARTERY X-RAY EACH VESSEL: CPT | Performed by: NEUROLOGICAL SURGERY

## 2021-02-05 PROCEDURE — 76377 3D RENDER W/INTRP POSTPROCES: CPT | Performed by: NEUROLOGICAL SURGERY

## 2021-02-05 PROCEDURE — 36227 PLACE CATH XTRNL CAROTID: CPT | Performed by: NEUROLOGICAL SURGERY

## 2021-02-05 PROCEDURE — 36225 PLACE CATH SUBCLAVIAN ART: CPT

## 2021-02-05 PROCEDURE — 36223 PLACE CATH CAROTID/INOM ART: CPT

## 2021-02-05 PROCEDURE — 36226 PLACE CATH VERTEBRAL ART: CPT

## 2021-02-05 PROCEDURE — 76937 US GUIDE VASCULAR ACCESS: CPT | Performed by: NEUROLOGICAL SURGERY

## 2021-02-05 PROCEDURE — 76937 US GUIDE VASCULAR ACCESS: CPT

## 2021-02-05 PROCEDURE — 36226 PLACE CATH VERTEBRAL ART: CPT | Performed by: NEUROLOGICAL SURGERY

## 2021-02-05 PROCEDURE — 36224 PLACE CATH CAROTD ART: CPT | Performed by: NEUROLOGICAL SURGERY

## 2021-02-05 RX ORDER — LIDOCAINE HYDROCHLORIDE 10 MG/ML
INJECTION, SOLUTION EPIDURAL; INFILTRATION; INTRACAUDAL; PERINEURAL CODE/TRAUMA/SEDATION MEDICATION
Status: COMPLETED | OUTPATIENT
Start: 2021-02-05 | End: 2021-02-05

## 2021-02-05 RX ORDER — EPHEDRINE SULFATE 50 MG/ML
INJECTION INTRAVENOUS AS NEEDED
Status: DISCONTINUED | OUTPATIENT
Start: 2021-02-05 | End: 2021-02-05

## 2021-02-05 RX ORDER — MIDAZOLAM HYDROCHLORIDE 2 MG/2ML
INJECTION, SOLUTION INTRAMUSCULAR; INTRAVENOUS AS NEEDED
Status: DISCONTINUED | OUTPATIENT
Start: 2021-02-05 | End: 2021-02-05

## 2021-02-05 RX ORDER — NITROGLYCERIN 20 MG/100ML
INJECTION INTRAVENOUS CODE/TRAUMA/SEDATION MEDICATION
Status: COMPLETED | OUTPATIENT
Start: 2021-02-05 | End: 2021-02-05

## 2021-02-05 RX ORDER — HYDROCODONE BITARTRATE AND ACETAMINOPHEN 5; 325 MG/1; MG/1
1 TABLET ORAL EVERY 6 HOURS PRN
Status: DISCONTINUED | OUTPATIENT
Start: 2021-02-05 | End: 2021-02-05 | Stop reason: HOSPADM

## 2021-02-05 RX ORDER — LIDOCAINE HYDROCHLORIDE 20 MG/ML
INJECTION, SOLUTION INFILTRATION; PERINEURAL CODE/TRAUMA/SEDATION MEDICATION
Status: COMPLETED | OUTPATIENT
Start: 2021-02-05 | End: 2021-02-05

## 2021-02-05 RX ORDER — SODIUM CHLORIDE 9 MG/ML
125 INJECTION, SOLUTION INTRAVENOUS CONTINUOUS
Status: DISCONTINUED | OUTPATIENT
Start: 2021-02-05 | End: 2021-02-05 | Stop reason: HOSPADM

## 2021-02-05 RX ORDER — SODIUM CHLORIDE 9 MG/ML
75 INJECTION, SOLUTION INTRAVENOUS CONTINUOUS
Status: DISCONTINUED | OUTPATIENT
Start: 2021-02-05 | End: 2021-02-05 | Stop reason: HOSPADM

## 2021-02-05 RX ORDER — HEPARIN SODIUM 1000 [USP'U]/ML
INJECTION, SOLUTION INTRAVENOUS; SUBCUTANEOUS CODE/TRAUMA/SEDATION MEDICATION
Status: COMPLETED | OUTPATIENT
Start: 2021-02-05 | End: 2021-02-05

## 2021-02-05 RX ORDER — VERAPAMIL HYDROCHLORIDE 2.5 MG/ML
INJECTION, SOLUTION INTRAVENOUS CODE/TRAUMA/SEDATION MEDICATION
Status: COMPLETED | OUTPATIENT
Start: 2021-02-05 | End: 2021-02-05

## 2021-02-05 RX ORDER — SODIUM CHLORIDE 9 MG/ML
INJECTION, SOLUTION INTRAVENOUS CONTINUOUS PRN
Status: DISCONTINUED | OUTPATIENT
Start: 2021-02-05 | End: 2021-02-05

## 2021-02-05 RX ORDER — FENTANYL CITRATE 50 UG/ML
INJECTION, SOLUTION INTRAMUSCULAR; INTRAVENOUS AS NEEDED
Status: DISCONTINUED | OUTPATIENT
Start: 2021-02-05 | End: 2021-02-05

## 2021-02-05 RX ADMIN — MIDAZOLAM 1 MG: 1 INJECTION INTRAMUSCULAR; INTRAVENOUS at 08:16

## 2021-02-05 RX ADMIN — HEPARIN SODIUM 4000 UNITS: 1000 INJECTION INTRAVENOUS; SUBCUTANEOUS at 08:23

## 2021-02-05 RX ADMIN — VERAPAMIL HYDROCHLORIDE 5 MG: 2.5 INJECTION, SOLUTION INTRAVENOUS at 08:23

## 2021-02-05 RX ADMIN — FENTANYL CITRATE 25 MCG: 50 INJECTION INTRAMUSCULAR; INTRAVENOUS at 09:17

## 2021-02-05 RX ADMIN — FENTANYL CITRATE 25 MCG: 50 INJECTION INTRAMUSCULAR; INTRAVENOUS at 08:55

## 2021-02-05 RX ADMIN — MIDAZOLAM 2 MG: 1 INJECTION INTRAMUSCULAR; INTRAVENOUS at 08:10

## 2021-02-05 RX ADMIN — MIDAZOLAM 0.5 MG: 1 INJECTION INTRAMUSCULAR; INTRAVENOUS at 08:55

## 2021-02-05 RX ADMIN — Medication 400 MCG: at 08:23

## 2021-02-05 RX ADMIN — FENTANYL CITRATE 25 MCG: 50 INJECTION INTRAMUSCULAR; INTRAVENOUS at 09:24

## 2021-02-05 RX ADMIN — FENTANYL CITRATE 25 MCG: 50 INJECTION INTRAMUSCULAR; INTRAVENOUS at 09:13

## 2021-02-05 RX ADMIN — EPHEDRINE SULFATE 5 MG: 50 INJECTION, SOLUTION INTRAVENOUS at 08:32

## 2021-02-05 RX ADMIN — FENTANYL CITRATE 50 MCG: 50 INJECTION INTRAMUSCULAR; INTRAVENOUS at 08:09

## 2021-02-05 RX ADMIN — EPHEDRINE SULFATE 5 MG: 50 INJECTION, SOLUTION INTRAVENOUS at 08:45

## 2021-02-05 RX ADMIN — PHENYLEPHRINE HYDROCHLORIDE 100 MCG: 10 INJECTION INTRAVENOUS at 08:26

## 2021-02-05 RX ADMIN — MIDAZOLAM 0.5 MG: 1 INJECTION INTRAMUSCULAR; INTRAVENOUS at 08:44

## 2021-02-05 RX ADMIN — LIDOCAINE HYDROCHLORIDE 1 ML: 10 INJECTION, SOLUTION EPIDURAL; INFILTRATION; INTRACAUDAL; PERINEURAL at 08:23

## 2021-02-05 RX ADMIN — SODIUM CHLORIDE: 0.9 INJECTION, SOLUTION INTRAVENOUS at 08:08

## 2021-02-05 RX ADMIN — PHENYLEPHRINE HYDROCHLORIDE 100 MCG: 10 INJECTION INTRAVENOUS at 08:30

## 2021-02-05 RX ADMIN — FENTANYL CITRATE 50 MCG: 50 INJECTION INTRAMUSCULAR; INTRAVENOUS at 08:13

## 2021-02-05 RX ADMIN — IODIXANOL 200 ML: 320 INJECTION, SOLUTION INTRAVASCULAR at 09:31

## 2021-02-05 NOTE — H&P
Patient seen and examined independently  Clinic note from 1/18/21 remains current  Patient is not on any new medications and has not had any new medical problems  Patient remains on ASA  There were no vitals taken for this visit  On exam, patient is neurologically intact  Heart rate is regular  Breath sounds are clear  Plan to proceed with diagnostic cerebral arteriogram to better delineate PICA aneurysm

## 2021-02-05 NOTE — SEDATION DOCUMENTATION
Diagnostic cerebral angiogram completed by Dr Nhi Dunham  Right groin and wrist wounds clean, dry and intact  Report called to [facility], RN

## 2021-02-05 NOTE — OP NOTE
OPERATIVE REPORT  PATIENT NAME: Dexter Linn     :  1960  MRN: [id number]   Pt Location: Interventional radiology    SURGERY DATE: 21     Preop Diagnosis:  1  Incidental left PICA aneurysm  2  Left vertebral artery occlusion    Postop Diagnosis  1  Incidental left PICA aneurysm  2  Left vertebral artery occlusion    Procedure:  Use of ultrasound  Right radial arteriogram   Right Common Carotid Arteriogram  Right Internal Carotid Arteriogram  Left Common Carotid Arteriogram  Left Internal Carotid Arteriogram  Left External Carotid Arteriogram   left occipital artery arteriogram  Right Vertebral Artery Arteriogram  Left Vertebral Artery Arteriogram  3D rotational arteriogram left thyrocervical trunk/vertebral artery    Surgeon:   Jose E Mckeon MD    Specimen(s):  None    Estimated Blood Loss:   None    Drains:  None    Anesthesia Type:   Monitored Anesthesia Care     Complications:  None    Operative Indications:  Dexter Linn  is a very pleasant 56 y o  female who  Was found to have an incidental left PICA aneurysm in the evaluation dizziness  After discussing the risks and benefits of a diagnostic cerebral arteriogram including bleeding, stroke, groin hematoma, and death the patient elected to proceed  Procedure Details:  After obtaining written informed consent, the patient was brought into the operating room and moved to the OR table in supine fashion  The right radial artery was prepped and draped in the usual sterile fashion  Monitored anesthesia care was induced  A surgical time-out was performed  3 cc mixture of lidocaine and 400 mcg of nitroglycerin was injected immediately above the right radial artery  Using ultrasound guidance percutaneous access to the right radial artery was obtained  A 5 Thai tapered sheath was then placed    Next and infusion of 300 mcg of nitroglycerin, 3000 units heparin, and 5 mg of verapamil were slowly injected intra-arterially through the right radial sheath  Radial artery arteriogram then performed  Next a 5 Western Tammy Sevilla glide catheter was advanced and reshaped  The catheter was then withdrawn into the aortic arch and advanced into the left common carotid artery  AP lateral and oblique images of the left cervical carotid were obtained  The catheter was then advanced and the left internal carotid artery was then catheterized  AP lateral and magnified oblique images of the left intracranial carotid circulation were obtained  The right common carotid artery was then catheterized  AP lateral and oblique images of the right cervical carotid were obtained  The catheter was then advanced and the right internal carotid artery was then catheterized  AP lateral and magnified oblique images of the right intracranial carotid circulation were obtained  Right vertebral artery was catheterized  Trans facial and lateral and oblique images of the right vertebral artery circulation were then obtained  At this juncture it was clear that I was unable  Fully understand the filling pattern of her left vertebral artery and visualized aneurysm  As such we aborted radial access and transitioned to right femoral access  Using a 5 Nepalese introducer sheath right femoral access was obtained in the usual Seldinger technique  A branching catheter was then advanced into the left subclavian artery  Left vertebral artery arteriogram was performed  Next a left thyrocervical trunk artery arteriogram was performed  A 3D rotational arteriogram of this vessel was then completed  Post processed images were reviewed at a separate workstation  Next the catheter was withdrawn and the left external carotid artery was catheterized  AP and lateral images were then obtained  Selective catheterization of the left occipital artery was then obtained    AP lateral and oblique images of the left occipital artery was taken with filling into the left PICA     The catheter was then withdrawn from the body and a TR compression band was placed  A Mynx was placed for femoral closure  There was appropriate hemostasis  The patient was then awoken from monitored anesthesia care and found to be in baseline neurologic condition  All sponge and needle counts were correct  INTERPRETATION OF ANGIOGRAPHIC FINDINGS:   1  The Left common carotid circulation reveals antegrade flow into the internal and external carotid arteries  There is no hemodynamically significant carotid stenosis as defined by NASCET criteria  2  The Left internal carotid artery circulation reveals antegrade flow into the middle cerebral and anterior cerebral arteries  There is a patent anterior communicating artery  There is a patent posterior communicating artery  There is no evidence of aneurysm, AVM, or vascular malformation  The capillary and venous phases are unremarkable  3  The Right common carotid circulation reveals antegrade flow into the internal and external carotid arteries  There is no hemodynamically significant carotid stenosis as defined by NASCET criteria  4  The Right internal carotid artery circulation reveals antegrade flow into the middle cerebral and anterior cerebral arteries  There is a patent anterior communicating artery  There is a patent posterior communicating artery  There is no evidence of aneurysm, AVM, or vascular malformation  The capillary and venous phases are unremarkable  5  The Right vertebral intracranial circulation reveals   Antegrade flow without any reflux into the contralateral vertebral artery  There is a stump of the left vertebral artery at the vertebrobasilar junction  There is no filling of the proximal vertebral artery vasculature on the left-hand side  There is no visualization of the left PICA  There is no visualization of the aneurysm    The remainder of the posterior circulation has antegrade flow without any evidence of aneurysms AVMs  Capillary and venous phases are unremarkable  6   The left vertebral artery and injection demonstrates sluggish flow with occlusion  7    Injection of the left thyrocervical trunk demonstrates several cervical collateral vessels that reconstitute the left vertebral artery  There is retrograde flow proximally and distally  There is good visualization of a 5 x 6 mm fusiform PICA aneurysm  The left vertebral artery reconstitutes with only filling of the left PICA  8    3D rotational arteriogram of the left thyrocervical trunk demonstrates anastomosis of the thyrocervical trunk and reconstitution of the vertebral artery as well as PICA aneurysm  9   The left external carotid artery has antegrade flow  There is an enlarged occipital artery with filling and anastomosis into the left vertebral artery and PICA aneurysm  10   Selective catheterization of the left occipital artery reveals antegrade flow with reconstitution of the left vertebral artery and left PICA aneurysm  11   Limited femoral arteriogram reveals normal   Puncture anatomy     Impression:  1  Fusiform of 5 x 6 mm distal PICA aneurysm originating from an occluded left vertebral artery that reconstitutes through thyrocervical and external carotid collaterals      Patient Disposition:  APU    SIGNATURE: Steve Deras MD  DATE: 02/05/21   TIME: 12:15 PM

## 2021-02-05 NOTE — ANESTHESIA PREPROCEDURE EVALUATION
Procedure:  IR CEREBRAL ANGIOGRAPHY    Relevant Problems   ANESTHESIA (within normal limits)      CARDIO   (+) Hypertension   (+) Intracranial aneurysm      GI/HEPATIC   (+) GERD (gastroesophageal reflux disease)      PULMONARY (within normal limits)   (-) Sleep apnea   (-) Smoking   (-) URI (upper respiratory infection)    BMI 31    Physical Exam    Airway    Mallampati score: II  TM Distance: >3 FB  Neck ROM: full     Dental   No notable dental hx     Cardiovascular      Pulmonary      Other Findings  very anxious     Lab Results   Component Value Date    WBC 5 70 01/10/2021    HGB 14 9 01/10/2021     01/10/2021     Lab Results   Component Value Date    SODIUM 143 01/11/2021    K 4 2 01/11/2021    BUN 15 01/11/2021    CREATININE 0 88 01/11/2021    EGFR 71 01/11/2021     Lab Results   Component Value Date    PTT 31 02/03/2021      Lab Results   Component Value Date    INR 0 98 02/03/2021     Lab Results   Component Value Date    HGBA1C 5 8 (H) 01/10/2021     Anesthesia Plan  ASA Score- 2     Anesthesia Type- IV sedation with anesthesia with ASA Monitors  Additional Monitors:   Airway Plan:     Comment: Pt very anxious, unsure if she can tolerate with sedation - Dr Pankaj Brian discussed with her - will start with sedation but backup LMA if required  Plan Factors-Exercise comment: Pt reports stable PRECIADO  Chart reviewed  Existing labs reviewed  Patient summary reviewed  Patient is not a current smoker  Induction- intravenous  Postoperative Plan-     Informed Consent- Anesthetic plan and risks discussed with patient and spouse  I personally reviewed this patient with the CRNA  Discussed and agreed on the Anesthesia Plan with the CRNA  Devin Parker

## 2021-02-05 NOTE — ANESTHESIA POSTPROCEDURE EVALUATION
Post-Op Assessment Note    CV Status:  Stable    Pain management: adequate     Mental Status:  Alert and awake   Hydration Status:  Euvolemic   PONV Controlled:  Controlled   Airway Patency:  Patent   Two or more mitigation strategies used for obstructive sleep apnea   Post Op Vitals Reviewed: Yes      Staff: CRNA   Comments: pt awake alert to short stay floor with RN        No complications documented      BP      Temp      Pulse     Resp      SpO2

## 2021-02-05 NOTE — DISCHARGE INSTRUCTIONS
Today, you underwent a diagnostic cerebral angiogram under the care of Dr Halley Fjaardo for evaluation of ***  ? The following instructions will help you care for yourself, or be cared for upon your return home today  These are guidelines for your care right after your surgery only  ? Notify Your Doctor or Nurse if you have any of the following:  ? SYMPTOMS OF WOUND INFECTION--   Increased pain in or around the incision   Swelling around the incision  Any drainage from the incision  Incision separates or opens up  Warmth in the tissues around the incision  Redness or tenderness on the skin near the incision   Fever (temperature greater than 101 degrees F)   ? NEUROLOGICAL CHANGES--  Change in alertness  Increased sleepiness   Nausea and vomiting   New onset of numbness or weakness in arms or legs   New problems with your bowels or bladder  New or worse problems with balance or walking  Seizures, new or worsening  ? UNRELIEVED HEADACHE PAIN--  New or increased pain unrelieved with pain medications   Pain associated with nausea and vomiting   Pain associated with other symptoms  ? QUESTIONS OR PROBLEMS--  Any questions or problems that you are unsure about  Wound Care:  Keep Incision Clean and Dry   You may shower daily, but do not soak incision  Pat dry after showering  No tub baths, soaking, swimming for 1 week after angiogram    You do not need to cover the incision  Mild to moderate bruising and tenderness to the site is expected and may last up to 1-2 weeks after your procedure  ?  A closure device was placed at the catheter insertion site  This is MRI compatible  Remove the dressing 24 hours after your procedure  If your groin site is bleeding, apply firm pressure for 10 minutes  Reinforce dressing rather than removing and checking frequently  If continues to bleed through the dressing after 1 hour, contact your neurosurgeon's office  Anticipatory Education:  ?   PAIN MED W/ Acetaminophen (Tylenol)  --IF a prescription for pain medicine has been sent home with you:  --Narcotic pain medication may cause constipation  Be sure to take stool softeners or laxatives while you are on narcotic pain medication  --Do not drive after taking prescription pain medicine  ?  If this medicine is too strong, or no longer necessary, or we did NOT recommend/prescribe oral narcotics, you may take:   - Tylenol Extra-strength/Acetaminophen, 2 tablets every 4-6 hours as needed for mild pain  DO NOT TAKE MORE THAN 4000MG PER DAY from combined sources  NOTE: Remember to eat when taking pain medicines in order to avoid nausea  Watch for constipation  Eat plenty of fruits, vegetables, juices, and drink 6-8 glasses of water each day  Constipation: Stay active and drink at least 6-8 cups of fluid each day to prevent constipation  If you need a laxative or stool softener follow the package directions or consult with your local pharmacists if you have questions  ? After anesthesia, rest for 24 hours  Do not drive, drink alcohol beverages or make any important decisions during this time  General anesthesia may cause sore throat, jaw discomfort or muscle aches  These symptoms can last for one or two days  Activity: Please follow these instructions:  Advance your activity as you can tolerate  You may do light house work; nothing strenuous   You may walk all you want  You may go up and down the steps  Use the railing for support  Do not do excessive bending, straining or heavy lifting for 48 hours after your procedure  Do not drive or return to work until you are instructed   It is normal for your energy level and sleep patterns to change after surgery  Get extra sleep at night and take naps during the day to help you feel less tired  Take rest periods during the day  Complete recovery may take several weeks  ?  You may resume driving after 68-27 hours recovery  You may return to work after 48 hours of recovery     ?  Diet:  Your doctor has recommended that you follow these diet instructions at home  Refer to the patient education materials you received during your hospital stay  If you would like more nutrition counseling, ask your doctor about making an appointment with an outpatient dietitian  Resume your home diet  ? Medications:  Please resume your home medications as instructed  ? Home Supplies and Equipment:  none  Additional Contacts:  ? CONTACTS FOR NEUROSURGERY: You may call your neurosurgeons office if you have questions between 8:30 am and 4:30 pm  You may request to speak to the nurse practitioner who is available Monday through Friday  ?  For off hours or the weekend you may call your neurosurgeon's office to leave a message

## 2021-02-09 ENCOUNTER — TELEPHONE (OUTPATIENT)
Dept: NEUROLOGY | Facility: CLINIC | Age: 61
End: 2021-02-09

## 2021-02-09 NOTE — TELEPHONE ENCOUNTER
Called and made patient aware  Switched appointment to telephone appointment  Nito Acosta that the best number to reach her is her cell phone, [phone]

## 2021-02-09 NOTE — TELEPHONE ENCOUNTER
Patient calling asking for the need of tomorrows appointment  Said that she is seeing Dr Angelica Kwon at the end of the month and is questioning the need for the appointment  Said that she does not want to expose herself to COVID and the trip to our office is an hour long  I did advise that our office and neurosurgery are under the same umbrella and coordinate care together and that once she is recovered from her procedure Dr Angelica Kwon will be deferring to our office for further care  Patient states understanding but does not feel comfortable making the trip  Advised that a video appointment may be an option but she denied stating that she did not have access to video  She is agreeable to switching to a telephone appointment  Is it ok to switch? Please advise    [phone]: [facility] to leave a detailed message

## 2021-02-10 ENCOUNTER — TELEPHONE (OUTPATIENT)
Dept: NEUROLOGY | Facility: CLINIC | Age: 61
End: 2021-02-10

## 2021-02-10 ENCOUNTER — TELEMEDICINE (OUTPATIENT)
Dept: NEUROLOGY | Facility: CLINIC | Age: 61
End: 2021-02-10
Payer: COMMERCIAL

## 2021-02-10 VITALS
WEIGHT: 175 LBS | BODY MASS INDEX: 31.01 KG/M2 | HEART RATE: 72 BPM | HEIGHT: 63 IN | SYSTOLIC BLOOD PRESSURE: 108 MMHG | DIASTOLIC BLOOD PRESSURE: 75 MMHG

## 2021-02-10 DIAGNOSIS — R29.90 STROKE-LIKE SYMPTOMS: Primary | ICD-10-CM

## 2021-02-10 DIAGNOSIS — I10 ESSENTIAL HYPERTENSION: ICD-10-CM

## 2021-02-10 DIAGNOSIS — I67.1 INTRACRANIAL ANEURYSM: ICD-10-CM

## 2021-02-10 PROCEDURE — 99443 PR PHYS/QHP TELEPHONE EVALUATION 21-30 MIN: CPT | Performed by: PHYSICIAN ASSISTANT

## 2021-02-10 NOTE — PROGRESS NOTES
Virtual Brief Visit    Assessment/Plan:    Problem List Items Addressed This Visit        Cardiovascular and Mediastinum    Hypertension    Intracranial aneurysm       Other    Stroke-like symptoms - Primary     Patient originally presented to the ED with worsening dizziness, gait issues and left visual changes  Her workup did not reveal any evidence of a stroke or bleed however she was found to have significantly increased blood pressure (218/103) which was felt that to be the cause of her complaints  Her symptoms have improved along with better control of her blood pressure  During her workup she was found to have a left vertebral artery occlusion with reconstitution distally  Given reconstitution and collaterals we discussed that this finding represents a finding that has been present for some time  At this time would recommend that she remain on ASA daily which appears to be fine with neurosurgery per their last note  At this time she will   - Remain on ASA daily as long as okay by neurosurgery  - Remain on blood pressure medications as prescribed by PCP  Her pressure appears to be much better controlled at his time (120s/80s at home)  We discussed the importance of continuing to manage her pressure with a goal of less than 140    - Continue follow ups with neurosurgery regarding left PICA aneurysm  She had a recent angiogram and will be following with them to review results later this month  - Remain on Lipitor for cholesterol management per PCP   - Patient does not smoke and discussed the importance of continued smoking cessation    - Advised patient to avoid using NSAIDs (Motrin, Ibuprofen) for headaches or other pain mild pain and to use Tylenol instead  - Recommend mediterranean diet & regular exercise at least 4-5 times a week for 20-30 minutes       If the patient experiences any new stroke like symptoms such as facial droop on one side, weakness/paralysis on one side, speech trouble, numbness on one side, balance issues, any vision changes, extreme dizziness or any new headache, to call 9-1-1 immediately or to proceed to the nearest ER immediately  Reason for visit is HFU  Chief Complaint   Patient presents with    Virtual Brief Visit        Encounter provider Lisa Medrano PA-C    Provider located at [facility] [id number]    Recent Visits  Date Type Provider Dept   02/09/21 Telephone Adriano Kurtz RN [facility] [address] [facility] recent visits within past 7 days and meeting all other requirements     Today's Visits  Date Type Provider Dept   02/10/21 [address] [place] [address], NARINDER [facility] Baldemar Quinonez   Showing today's visits and meeting all other requirements     Future Appointments  No visits were found meeting these conditions  Showing future appointments within next 150 days and meeting all other requirements        After connecting through the telephone and patient was informed that this is not a secure, HIPAA-compliant platform  She agrees to proceed  , the patient was identified by name and date of birth  Jessica Linn was informed that this is a telemedicine visit and that the visit is being conducted through the telephone and patient was informed that this is not a secure, HIPAA-compliant platform  She agrees to proceed     My office door was closed  No one else was in the room  She acknowledged consent and understanding of privacy and security of the platform  The patient has agreed to participate and understands she can discontinue the visit at any time  Patient is aware this is a billable service  It was my intent to perform this visit via video technology but the patient was not able to do a video connection so the visit was completed via audio telephone only        Subjective    Jessica Linn is a 56 y o  female with hypertension who presents for hospital follow up  To review, she originally presented 1/9/21 with dizziness, worsening ataxia, gait abnormality and visual disturbance out of left eye  Her blood pressure on admission was 218/103  This was not uncommon for the patient and she has had it as high as 275 systolic when checked at home  Admitted to medication noncompliance and was not on aspirin at home  No tobacco use, no significant alcohol use  No known family history of aneurysm  MRI brain did not show any acute stroke or bleed  Her CTA revealed an incidental left distal 5mm PICA aneurysm  She was also found to have a occluded left vertebral artery and was started on ASA  She was started on medication for her blood pressure with improvement of her pressure and symptoms  Neurology felt her symptoms were likely related to her uncontrolled hypertension  -CTA head/neck - No acute intracranial pathology  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  Reconstitution of attenuated V1 and V2 segments  Reconstitution of normal caliber V3 segment via collaterals  Subsequent intracranial occlusion of the left V4 segment distal to patent PICA  5 mm distal left PICA aneurysm  No other significant cervical or intracranial stenosis or vessel occlusion  -MRI brain - No acute ischemia  Scattered white matter change suggestive of chronic microangiopathy  There is a focus of chronic hemosiderin deposition within the posterior fossa along the left posterior lateral aspect of the 4th ventricle with imaging characteristics suggestive of cavernous angioma  INTERVAL HISTORY:  She recently underwent a formal diagnostic cerebral arteriogram given her left PICA aneurysm noted on CTA  She is overall feeling well  She still has some dizziness which she has had for over 10 years  She was diagnosed with vertigo at that time  When she gets the dizziness she feels that she has to focus her eyes on one spot or she will be off balance    She will also get some nausea  She will still have some occasional left vision changes when she has worked a long day  She has not yet followed with the ophthalmologist    She checks her pressure daily at home  She is getting values closer to 120s/80s  She does still have an occasional higher pressure (150s) however this was random  She remains on Metoprolol and Norvasc  She is also taking ASA daily and Lipitor  I personally reviewed and updated the ROS  Past Medical History:   Diagnosis Date    GERD (gastroesophageal reflux disease)     Hypertension     Urinary tract bacterial infections        Past Surgical History:   Procedure Laterality Date    ABDOMINAL SURGERY      DILATION AND CURETTAGE, DIAGNOSTIC / THERAPEUTIC      HYSTERECTOMY      IR CEREBRAL ANGIOGRAPHY  2/5/2021    TONSILLECTOMY      TUBAL LIGATION         Current Outpatient Medications   Medication Sig Dispense Refill    amLODIPine (NORVASC) 5 mg tablet Take 5 mg by mouth daily      aspirin 81 mg chewable tablet Chew 1 tablet (81 mg total) daily 30 tablet 0    atorvastatin (LIPITOR) 40 mg tablet Take 1 tablet (40 mg total) by mouth every evening 30 tablet 0    cyanocobalamin (VITAMIN B-12) 1000 MCG tablet Take 1,000 mcg by mouth daily      famotidine (PEPCID) 40 MG tablet Take 40 mg by mouth daily      metoprolol succinate (TOPROL-XL) 50 mg 24 hr tablet Take 25 mg by mouth daily       Multiple Vitamins-Minerals (MULTIVITAMIN ADULTS 50+ PO) Take 1 tablet by mouth daily      Multiple Vitamins-Minerals (ZINC PO) Take 1 tablet by mouth daily        No current facility-administered medications for this visit  Allergies   Allergen Reactions    Sulfa Antibiotics Other (See Comments) and Rash     Massive headache       Review of Systems   Constitutional: Positive for fatigue  Negative for appetite change and fever  HENT: Negative  Negative for hearing loss, tinnitus, trouble swallowing and voice change  Eyes: Negative    Negative for photophobia and pain  Respiratory: Negative  Negative for shortness of breath  Cardiovascular: Negative  Negative for palpitations  Gastrointestinal: Positive for nausea  Negative for vomiting  Endocrine: Negative  Negative for cold intolerance  Genitourinary: Negative  Negative for dysuria, frequency and urgency  Musculoskeletal: Negative  Negative for myalgias and neck pain  Skin: Negative  Negative for rash  Allergic/Immunologic: Negative  Neurological: Positive for dizziness  Negative for tremors, seizures, syncope, facial asymmetry, speech difficulty, weakness, light-headedness, numbness and headaches  Hematological: Negative  Does not bruise/bleed easily  Psychiatric/Behavioral: Negative  Negative for confusion, hallucinations and sleep disturbance  All other systems reviewed and are negative  Vitals:    02/10/21 0810   BP: 108/75   BP Location: Left arm   Patient Position: Sitting   Cuff Size: Standard   Pulse: 72   Weight: 79 4 kg (175 lb)   Height: 5' 3" (1 6 m)         I spent 25 minutes directly with the patient during this visit    VIRTUAL VISIT DISCLAIMER    Kimberly Linn acknowledges that she has consented to an online visit or consultation  She understands that the online visit is based solely on information provided by her, and that, in the absence of a face-to-face physical evaluation by the physician, the diagnosis she receives is both limited and provisional in terms of accuracy and completeness  This is not intended to replace a full medical face-to-face evaluation by the physician  Kimberly Linn understands and accepts these terms

## 2021-02-10 NOTE — PATIENT INSTRUCTIONS
At this time she will   - Remain on ASA daily as long as okay by neurosurgery  - Remain on blood pressure medications as prescribed by PCP  Her pressure appears to be much better controlled at his time (120s/80s at home)  We discussed the importance of continuing to manage her pressure with a goal of less than 140    - Continue follow ups with neurosurgery regarding left PICA aneurysm  She had a recent angiogram and will be following with them to review results later this month  - Remain on Lipitor for cholesterol management per PCP   - Patient does not smoke and discussed the importance of continued smoking cessation    - Advised patient to avoid using NSAIDs (Motrin, Ibuprofen) for headaches or other pain mild pain and to use Tylenol instead  - Recommend mediterranean diet & regular exercise at least 4-5 times a week for 20-30 minutes  If the patient experiences any new stroke like symptoms such as facial droop on one side, weakness/paralysis on one side, speech trouble, numbness on one side, balance issues, any vision changes, extreme dizziness or any new headache, to call 9-1-1 immediately or to proceed to the nearest ER immediately

## 2021-02-10 NOTE — TELEPHONE ENCOUNTER
Called patient to schedule 6 month follow up with Dr Deandre Mckeon  The patient was out shopping and asked if I can call back tomorrow to schedule  Will call back tomorrow

## 2021-02-10 NOTE — ASSESSMENT & PLAN NOTE
Patient originally presented to the ED with worsening dizziness, gait issues and left visual changes  Her workup did not reveal any evidence of a stroke or bleed however she was found to have significantly increased blood pressure (218/103) which was felt that to be the cause of her complaints  Her symptoms have improved along with better control of her blood pressure  During her workup she was found to have a left vertebral artery occlusion with reconstitution distally  Given reconstitution and collaterals we discussed that this finding represents a finding that has been present for some time  At this time would recommend that she remain on ASA daily which appears to be fine with neurosurgery per their last note  At this time she will   - Remain on ASA daily as long as okay by neurosurgery  - Remain on blood pressure medications as prescribed by PCP  Her pressure appears to be much better controlled at his time (120s/80s at home)  We discussed the importance of continuing to manage her pressure with a goal of less than 140    - Continue follow ups with neurosurgery regarding left PICA aneurysm  She had a recent angiogram and will be following with them to review results later this month  - Remain on Lipitor for cholesterol management per PCP   - Patient does not smoke and discussed the importance of continued smoking cessation    - Advised patient to avoid using NSAIDs (Motrin, Ibuprofen) for headaches or other pain mild pain and to use Tylenol instead  - Recommend mediterranean diet & regular exercise at least 4-5 times a week for 20-30 minutes  If the patient experiences any new stroke like symptoms such as facial droop on one side, weakness/paralysis on one side, speech trouble, numbness on one side, balance issues, any vision changes, extreme dizziness or any new headache, to call 9-1-1 immediately or to proceed to the nearest ER immediately

## 2021-02-24 ENCOUNTER — OFFICE VISIT (OUTPATIENT)
Dept: NEUROSURGERY | Facility: CLINIC | Age: 61
End: 2021-02-24
Payer: COMMERCIAL

## 2021-02-24 VITALS
WEIGHT: 175 LBS | HEIGHT: 63 IN | BODY MASS INDEX: 31.01 KG/M2 | HEART RATE: 65 BPM | TEMPERATURE: 97 F | SYSTOLIC BLOOD PRESSURE: 120 MMHG | RESPIRATION RATE: 16 BRPM | DIASTOLIC BLOOD PRESSURE: 90 MMHG

## 2021-02-24 DIAGNOSIS — I10 ESSENTIAL HYPERTENSION: ICD-10-CM

## 2021-02-24 DIAGNOSIS — I67.1 INTRACRANIAL ANEURYSM: Primary | ICD-10-CM

## 2021-02-24 PROCEDURE — 99214 OFFICE O/P EST MOD 30 MIN: CPT | Performed by: NEUROLOGICAL SURGERY

## 2021-02-24 NOTE — PROGRESS NOTES
Patient Id: Jessica Linn is a 56 y o  female        Handedness: Right      Assessment/Plan:    Diagnoses and all orders for this visit:    Intracranial aneurysm    Essential hypertension        Discussion Summary:   1   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 and reconstitution through left thyrocervical trunk and occipital artery  We discussed the natural history and diagnosis of intracranial aneurysms  Specifically in relationships posterior circulation aneurysm her risk of hemorrhage is probably 0 5-1% per year  While not specifically imaged, there is documented evidence the aneurysm being present back in 2011  As such we discussed the risks associated with both continued observation or intervention  With regards to intervention we discussed the options of endovascular sacrifice distally at origin of the aneurysm with Dominick, possible balloon test occlusion of the proximal reconstituted vertebral artery, and surgical options  I do not believe open surgery is the best option due to high morbidity profile  Off-label use of flow diversion is another possibility  I will discuss this with her when she returns to clinic after thinking about options  Continued observation is not unreasonable at this time      2  Hypertension  We discussed the importance of control of her blood pressure  I would like her blood pressure goal to be less than 140  Chief Complaint: Follow-up        HPI:   This is a very pleasant 49-year-old female who presented to the emergency room with a sensation that her scalp was throbbing and hair hurt  She also had some dizziness with nausea as well as pins and needles in her hands and feet  She felt like her legs were heavy and her balance was poor  She states that a similar episode happened several years ago  In addition in 2011 she had an episode with significant nausea and vomiting and diarrhea where she passed out    She went to the emergency room and was admitted to the hospital at that time  I reviewed a CT scan from that admission which demonstrates a hyperdensity in the same area of her more recently found aneurysm  It is possible that this represented hemorrhage versus the aneurysm itself  Her workup at that time was ultimately negative  She has had similar episodes this last before, most significantly a couple years ago        She underwent a formal arteriogram approximately 2 weeks ago and returns to discuss her results  She denies any new neurologic  Complaints     Her past medical history significant for hypertension, hyperlipidemia  Her past surgical history significant for total hysterectomy with bladder injury      She states that she has no medical allergies  Sulfa is listed as an allergy but she states that this is not a true allergy but poorly tolerated      She is   She has full children ages 39, 45, 36, and 43  She is employed as a  at [employer]  She quit smoking in 2007  She denies any significant alcohol usage  She denies any illicit drug usage      No family history of subarachnoid hemorrhage or sudden death  Review of systems obtained by the MA reviewed and updated below  Review of Systems   Constitutional: Negative  HENT: Negative  Eyes: Positive for visual disturbance (when tired she gets blurry vision)  Respiratory: Negative  Cardiovascular: Negative  Gastrointestinal: Negative  Endocrine: Negative  Genitourinary: Negative  Musculoskeletal: Positive for gait problem (from dizziness) and neck pain (from the way she sleeps)  Negative for back pain and myalgias  Skin: Negative  Allergic/Immunologic: Negative  Neurological: Positive for dizziness, weakness (only when tired) and numbness (her hands feel cold)  Negative for tremors, seizures and headaches  Left side of face feels off like a numbness   Hematological: Bruises/bleeds easily (asa81)  Psychiatric/Behavioral: Negative  Physical Exam  Vitals:    02/24/21 1125   BP: 120/90   Pulse: 65   Resp: 16   Temp: (!) 97 °F (36 1 °C)   She is well appearing  Affect is appropriate  Body mass index is 31 kg/m²  Eloy Caceres She is awake alert and oriented  Hearing and vision are grossly intact  Her pupils are equal round reactive to light  Her extraocular movements are intact  Her face is symmetric  Tongue is midline  Facial sensation is intact and symmetric throughout  Shoulder shrug is 5/5  There is no drift or dysmetria  She has full strength in her bilateral upper and lower extremities  She has normal muscle tone muscle bulk  Her biceps reflexes and patellar reflexes are 2+ and symmetric  Hailee sign negative bilaterally  Sensation intact to light touch and pinprick throughout  Her gait is normal      Her heart rate is regular  Her breath sounds are clear  2+ radial pulses no carotid bruit          The following portions of the patient's history were reviewed and updated as appropriate: allergies, current medications, past family history, past medical history, past social history, past surgical history and problem list     Active Ambulatory Problems     Diagnosis Date Noted    GERD (gastroesophageal reflux disease)     Hypertension     Stroke-like symptoms 01/09/2021    Intracranial aneurysm 01/09/2021     Resolved Ambulatory Problems     Diagnosis Date Noted    No Resolved Ambulatory Problems     Past Medical History:   Diagnosis Date    Urinary tract bacterial infections        Past Surgical History:   Procedure Laterality Date    ABDOMINAL SURGERY      DILATION AND CURETTAGE, DIAGNOSTIC / THERAPEUTIC      HYSTERECTOMY      IR CEREBRAL ANGIOGRAPHY  2/5/2021    TONSILLECTOMY      TUBAL LIGATION           Current Outpatient Medications:     amLODIPine (NORVASC) 5 mg tablet, Take 5 mg by mouth daily, Disp: , Rfl:     aspirin 81 mg chewable tablet, Chew 1 tablet (81 mg total) daily, Disp: 30 tablet, Rfl: 0    atorvastatin (LIPITOR) 40 mg tablet, Take 1 tablet (40 mg total) by mouth every evening, Disp: 30 tablet, Rfl: 0    cyanocobalamin (VITAMIN B-12) 1000 MCG tablet, Take 1,000 mcg by mouth daily, Disp: , Rfl:     famotidine (PEPCID) 40 MG tablet, Take 40 mg by mouth daily, Disp: , Rfl:     metoprolol succinate (TOPROL-XL) 50 mg 24 hr tablet, Take 25 mg by mouth daily , Disp: , Rfl:     Multiple Vitamins-Minerals (MULTIVITAMIN ADULTS 50+ PO), Take 1 tablet by mouth daily, Disp: , Rfl:     Multiple Vitamins-Minerals (ZINC PO), Take 1 tablet by mouth daily , Disp: , Rfl:     Results/Data: We reviewed the results of her arteriogram in detail as well as the report

## 2021-03-09 ENCOUNTER — TELEPHONE (OUTPATIENT)
Dept: NEUROLOGY | Facility: CLINIC | Age: 61
End: 2021-03-09

## 2021-03-17 ENCOUNTER — OFFICE VISIT (OUTPATIENT)
Dept: NEUROSURGERY | Facility: CLINIC | Age: 61
End: 2021-03-17
Payer: COMMERCIAL

## 2021-03-17 VITALS
DIASTOLIC BLOOD PRESSURE: 80 MMHG | TEMPERATURE: 98 F | WEIGHT: 177 LBS | HEART RATE: 65 BPM | HEIGHT: 63 IN | RESPIRATION RATE: 16 BRPM | SYSTOLIC BLOOD PRESSURE: 130 MMHG | BODY MASS INDEX: 31.36 KG/M2

## 2021-03-17 DIAGNOSIS — I67.1 INTRACRANIAL ANEURYSM: Primary | ICD-10-CM

## 2021-03-17 PROCEDURE — 99215 OFFICE O/P EST HI 40 MIN: CPT | Performed by: NEUROLOGICAL SURGERY

## 2021-03-17 NOTE — PROGRESS NOTES
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 and reconstitution through left thyrocervical trunk and occipital artery  We discussed the natural history and diagnosis of intracranial aneurysms  Specifically in relationships posterior circulation aneurysm her risk of hemorrhage is probably 0 5-1% per year  While not specifically imaged, there is documented evidence the aneurysm being present back in 2011      after reviewing all these factors we also discussed options for treatment including open vascular surgery, flow diversion and vessel sacrifice  I do not believe that this is a good surgical case given its location  Regarding flow diversion the vessel is small  It may be feasible however I do have concerns crossing the aneurysm and risk of rupture while on dual anti-platelet therapy  As such I favor embolization of the aneurysm with likely distal sacrificed  She understands that this will be accompanied by cerebellar stroke and a risk of brainstem stroke  As such she will be monitored closely in the ICU after surgery and should expect a 3-5 day hospitalization to ensure that there is no posterior fossa swelling  She understands that if there is significant posterior fossa swelling she may require posterior  Fossa decompression  She additionally understands the other risks including vascular injury, stroke, aneurysm rupture and death  She has elected to proceed on this route      2  Hypertension  We discussed the importance of control of her blood pressure   I would like her blood pressure goal to be less than 140      3   Chronic chest pain and shortness of breath      We will send for preoperative clearance to primary care physician      Chief Complaint: Follow-up        HPI:   This is a very pleasant 13-year-old female who presented to the emergency room with a sensation that her scalp was throbbing and hair hurt   She also had some dizziness with nausea as well as pins and needles in her hands and feet   She felt like her legs were heavy and her balance was poor  Salomon Tavarez states that a similar episode happened several years ago   In addition in 2011 she had an episode with significant nausea and vomiting and diarrhea where she passed out  Salomon Tavarez went to the emergency room and was admitted to the hospital at that time  Modesta Coley reviewed a CT scan from that admission which demonstrates a hyperdensity in the same area of her more recently found aneurysm   It is possible that this represented hemorrhage versus the aneurysm itself   Her workup at that time was ultimately negative   She has had similar episodes this last before, most significantly a couple years ago       she returns to clinic today to discuss treatment options for her left PICA aneurysm  She denies any new neurologic complaints  She has significant anxiety regarding hemorrhage of the aneurysm and sudden death while alone at home      Her past medical history significant for hypertension, hyperlipidemia   Her past surgical history significant for total hysterectomy with bladder injury      She states that she has no medical allergies  Vallery Friday is listed as an allergy but she states that this is not a true allergy but poorly tolerated      She is   Salomon Tavarez has full children ages 39, 45, 36, and 43   She is employed as a  at [employer]   She quit smoking in 2007  She denies any significant alcohol usage   She denies any illicit drug usage      No family history of subarachnoid hemorrhage or sudden death      Review of systems obtained by the MA reviewed and updated below  Review of Systems   Constitutional: Negative  HENT: Negative           Sound sensitive to certain noises in her left ear     Eyes: Positive for visual disturbance (blurry vision on and off throughout the day)  Respiratory: Positive for shortness of breath (going up the stairs quickly)  Cardiovascular: Negative  Gastrointestinal: Negative  Endocrine: Negative  Genitourinary: Negative  Musculoskeletal: Positive for gait problem (from dizziness) and neck stiffness  Negative for back pain, myalgias and neck pain  Skin: Negative  Allergic/Immunologic: Negative  Neurological: Positive for dizziness, weakness (fatigue in her legs) and numbness (in her hands and feet, they are cold and feel like they go to sleep)  Negative for tremors, seizures, speech difficulty, light-headedness and headaches  Hematological: Bruises/bleeds easily (medication)  Psychiatric/Behavioral: Negative  Physical Exam  Vitals:    03/17/21 1534   BP: 130/80   Pulse: 65   Resp: 16   Temp: 98 °F (36 7 °C)   She is well appearing  Affect is appropriate  Body mass index is 31 35 kg/m²  [facility] Pac She is awake alert and oriented  Hearing and vision are grossly intact  Her pupils are equal round reactive to light  Her extraocular movements are intact  Her face is symmetric  Tongue is midline  Facial sensation is intact and symmetric throughout  Shoulder shrug is 5/5  There is no drift or dysmetria  She has full strength in her bilateral upper and lower extremities  She has normal muscle tone muscle bulk  Her biceps reflexes and patellar reflexes are 2+ and symmetric  Hailee sign negative bilaterally  Sensation intact to light touch and pinprick throughout  Her gait is normal      Her heart rate is regular  Her breath sounds are clear  2+ radial pulses no carotid bruit          The following portions of the patient's history were reviewed and updated as appropriate: allergies, current medications, past family history, past medical history, past social history, past surgical history and problem list     Active Ambulatory Problems     Diagnosis Date Noted    GERD (gastroesophageal reflux disease)     Hypertension     Stroke-like symptoms 01/09/2021    Intracranial aneurysm 01/09/2021     Resolved Ambulatory Problems     Diagnosis Date Noted    No Resolved Ambulatory Problems     Past Medical History:   Diagnosis Date    Urinary tract bacterial infections        Past Surgical History:   Procedure Laterality Date    ABDOMINAL SURGERY      DILATION AND CURETTAGE, DIAGNOSTIC / THERAPEUTIC      HYSTERECTOMY      IR CEREBRAL ANGIOGRAPHY  2/5/2021    TONSILLECTOMY      TUBAL LIGATION           Current Outpatient Medications:     amLODIPine (NORVASC) 5 mg tablet, Take 5 mg by mouth daily, Disp: , Rfl:     aspirin 81 mg chewable tablet, Chew 1 tablet (81 mg total) daily, Disp: 30 tablet, Rfl: 0    atorvastatin (LIPITOR) 40 mg tablet, Take 1 tablet (40 mg total) by mouth every evening, Disp: 30 tablet, Rfl: 0    famotidine (PEPCID) 40 MG tablet, Take 40 mg by mouth daily, Disp: , Rfl:     metoprolol succinate (TOPROL-XL) 50 mg 24 hr tablet, Take 25 mg by mouth daily , Disp: , Rfl:     Multiple Vitamins-Minerals (MULTIVITAMIN ADULTS 50+ PO), Take 1 tablet by mouth daily, Disp: , Rfl:     cyanocobalamin (VITAMIN B-12) 1000 MCG tablet, Take 1,000 mcg by mouth daily, Disp: , Rfl:     Multiple Vitamins-Minerals (ZINC PO), Take 1 tablet by mouth daily , Disp: , Rfl:     Results/Data:   Reviewed her imaging in detail

## 2021-04-07 DIAGNOSIS — I67.1 INTRACRANIAL ANEURYSM: Primary | ICD-10-CM

## 2021-04-07 DIAGNOSIS — Z11.59 SCREENING FOR VIRAL DISEASE: ICD-10-CM

## 2021-04-22 ENCOUNTER — TELEPHONE (OUTPATIENT)
Dept: CARDIOLOGY CLINIC | Facility: CLINIC | Age: 61
End: 2021-04-22

## 2021-04-22 NOTE — TELEPHONE ENCOUNTER
Called patient to see if she has been to a cardiologist in the past  She denied having a prior cardiologist

## 2021-04-28 ENCOUNTER — OFFICE VISIT (OUTPATIENT)
Dept: CARDIOLOGY CLINIC | Facility: CLINIC | Age: 61
End: 2021-04-28
Payer: COMMERCIAL

## 2021-04-28 VITALS
DIASTOLIC BLOOD PRESSURE: 70 MMHG | SYSTOLIC BLOOD PRESSURE: 120 MMHG | HEART RATE: 57 BPM | HEIGHT: 63 IN | WEIGHT: 177.7 LBS | BODY MASS INDEX: 31.48 KG/M2

## 2021-04-28 DIAGNOSIS — Z01.810 PREOPERATIVE CARDIOVASCULAR EXAMINATION: Primary | ICD-10-CM

## 2021-04-28 DIAGNOSIS — I67.1 INTRACRANIAL ANEURYSM: ICD-10-CM

## 2021-04-28 DIAGNOSIS — I10 ESSENTIAL HYPERTENSION: ICD-10-CM

## 2021-04-28 PROCEDURE — 99204 OFFICE O/P NEW MOD 45 MIN: CPT | Performed by: INTERNAL MEDICINE

## 2021-04-28 PROCEDURE — 93000 ELECTROCARDIOGRAM COMPLETE: CPT | Performed by: INTERNAL MEDICINE

## 2021-04-28 RX ORDER — ROSUVASTATIN CALCIUM 5 MG/1
5 TABLET, COATED ORAL DAILY
Qty: 30 TABLET | Refills: 3 | Status: SHIPPED | OUTPATIENT
Start: 2021-04-28 | End: 2021-07-26

## 2021-04-28 NOTE — PROGRESS NOTES
Cardiology Preoperative Visit    Chio Bearden Cutting  1960  [id number]  [facility]  [address] [address] [facility]   [phone]    1  Preoperative cardiovascular examination  POCT ECG   2  Intracranial aneurysm  rosuvastatin (CRESTOR) 5 mg tablet   3  Essential hypertension         Discussion/Summary:    There are no cardiac contraindications to proceeding with the proposed surgery  The patient is at low risk to proceed with no changes to medical therapy and no additional testing, except as noted below  Blood pressure initially elevated, but repeat value was better controlled  She was late to the office visit had made wrong turns  Very anxious initially  Continue current blood pressure regimen  She did not tolerate atorvastatin  She says she developed severe myalgias and then contact her mother who had the same problem and stop the medication on her own  While she does not have significant stenosis noted on her CTA, she does have aneurysms and therefore PAD  I think it is reasonable to try a low dose of Crestor and if tolerated, can ramp up the dose  Additionally, lipid panel done in January showed an LDL of almost 140  [place] near the [place] [id number]   She can follow-up as needed and otherwise if tolerates the Crestor can have adjustments and follow-up with her primary care physician for this  History Of Present Illness:       55-year-old female  She has hypertension, recently found cerebral aneurysm for which she needs embolization  she is referred to the office for preoperative evaluation  Reports a history of hypertension for which he was on amlodipine and metoprolol previously  She has recently developed severe hypertension and symptoms which she was concern for stroke  She presented to the hospital, the aneurysm was found  She has followed up with neurosurgery  Discussed different options, and plan for the embolization procedure in the middle of May  She denies to me any chest pain, shortness of breath,  Heart failure, PND, orthopnea, leg edema  She had an echocardiogram during 1 of her hospitalizations which was essentially unremarkable  Problem List     GERD (gastroesophageal reflux disease)    Essential hypertension    Stroke-like symptoms    Intracranial aneurysm        Past Medical History:   Diagnosis Date    GERD (gastroesophageal reflux disease)     Hypertension     Urinary tract bacterial infections      Social History     Tobacco Use    Smoking status: Former Smoker     Types: Cigarettes     Quit date: 4/15/2007     Years since quittin 0    Smokeless tobacco: Never Used   Substance Use Topics    Alcohol use: Yes     Frequency: Monthly or less     Comment: social    Drug use: Yes     Types: Marijuana     Comment: Marijuana at 14and 23years old         Family History   Problem Relation Age of Onset    Hypertension Mother     Hypertension Father      Past Surgical History:   Procedure Laterality Date    ABDOMINAL SURGERY      COLONOSCOPY      DILATION AND CURETTAGE, DIAGNOSTIC / THERAPEUTIC      HYSTERECTOMY      HYSTERECTOMY      IR CEREBRAL ANGIOGRAPHY  2021    TONSILLECTOMY      TUBAL LIGATION         Current Outpatient Medications:     amLODIPine (NORVASC) 5 mg tablet, Take 5 mg by mouth daily, Disp: , Rfl:     cyanocobalamin (VITAMIN B-12) 1000 MCG tablet, Take 1,000 mcg by mouth as needed , Disp: , Rfl:     famotidine (PEPCID) 40 MG tablet, Take 40 mg by mouth daily, Disp: , Rfl:     metoprolol succinate (TOPROL-XL) 50 mg 24 hr tablet, Take 25 mg by mouth daily , Disp: , Rfl:     Multiple Vitamins-Minerals (MULTIVITAMIN ADULTS 50+ PO), Take 1 tablet by mouth daily, Disp: , Rfl:     Multiple Vitamins-Minerals (ZINC PO), Take 1 tablet by mouth as needed , Disp: , Rfl:     aspirin 81 mg chewable tablet, Chew 1 tablet (81 mg total) daily, Disp: 30 tablet, Rfl: 0    rosuvastatin (CRESTOR) 5 mg tablet, Take 1 tablet (5 mg total) by mouth daily, Disp: 30 tablet, Rfl: 3  Allergies   Allergen Reactions    Sulfa Antibiotics Rash and Other (See Comments)     Massive headache- a discomfort       Vitals:    04/28/21 1426 04/28/21 1512   BP: 132/92 120/70   BP Location: Right arm    Patient Position: Sitting    Cuff Size: Adult    Pulse: 57    Weight: 80 6 kg (177 lb 11 2 oz)    Height: 5' 3" (1 6 m)      Vitals:    04/28/21 1426   Weight: 80 6 kg (177 lb 11 2 oz)      Height: 5' 3" (160 cm)   Body mass index is 31 48 kg/m²  Physical Exam:  GENERAL: Alert, well appearing, and in no distress  HEENT:  PERRL, EOMI, no scleral icterus, no conjunctival pallor  NECK:  Supple, No elevated JVP, no thyromegaly, no carotid bruits  HEART:  Regular rate and rhythm, normal S1/S2, no S3/S4, no murmur or rub  LUNGS:  Clear to auscultation bilaterally  ABDOMEN:  Soft, non-tender, positive bowel sounds, no rebound or guarding  EXTREMITIES:  No edema  VASCULAR:  Normal pedal pulses   NEURO: No focal deficits,  SKIN: Normal without suspicious lesions on exposed skin      ROS:  Except as noted in HPI, is otherwise reviewed in detail and a 12 point review of systems is negative      Labs:  Lab Results   Component Value Date    SODIUM 143 01/11/2021    K 4 2 01/11/2021     01/11/2021    CREATININE 0 88 01/11/2021    BUN 15 01/11/2021    CO2 29 01/11/2021    ALT 12 01/10/2021    AST 11 (L) 01/10/2021    INR 0 98 02/03/2021    GLUF 84 12/22/2020    HGBA1C 5 8 (H) 01/10/2021    WBC 5 70 01/10/2021    HGB 14 9 01/10/2021    HCT 43 9 01/10/2021     01/10/2021        No results found for: CHOL  Lab Results   Component Value Date    HDL 40 01/10/2021    HDL 42 12/22/2020     Lab Results   Component Value Date    LDLCALC 137 (H) 01/10/2021    LDLCALC 161 (H) 12/22/2020     Lab Results   Component Value Date    TRIG 116 01/10/2021    TRIG 135 12/22/2020       Testing:  Echo report reviewed  LEFT VENTRICLE: Size was normal  Systolic function was normal  Ejection fraction was estimated to be 60 %  There were no regional wall motion abnormalities  Wall thickness was normal  No evidence of apical thrombus  DOPPLER: Left  ventricular diastolic function parameters were normal      RIGHT VENTRICLE: The size was normal  Systolic function was normal  Wall thickness was normal      LEFT ATRIUM: Size was normal      RIGHT ATRIUM: Size was normal      MITRAL VALVE: Valve structure was normal  There was normal leaflet separation  DOPPLER: The transmitral velocity was within the normal range  There was no evidence for stenosis  There was trace regurgitation      AORTIC VALVE: The valve was trileaflet  Leaflets exhibited normal thickness and normal cuspal separation  DOPPLER: Transaortic velocity was within the normal range  There was no evidence for stenosis  There was no significant  regurgitation      TRICUSPID VALVE: The valve structure was normal  There was normal leaflet separation  DOPPLER: The transtricuspid velocity was within the normal range  There was no evidence for stenosis  There was no significant regurgitation      PULMONIC VALVE: Leaflets exhibited normal thickness, no calcification, and normal cuspal separation  DOPPLER: The transpulmonic velocity was within the normal range  There was no significant regurgitation      PERICARDIUM: There was no pericardial effusion  The pericardium was normal in appearance      AORTA: The root exhibited normal size      SYSTEMIC VEINS: IVC: The inferior vena cava was normal in size  EKG:    Sinus rhythm  57 beats per minute  Normal EKG

## 2021-05-03 RX ORDER — SODIUM CHLORIDE 9 MG/ML
75 INJECTION, SOLUTION INTRAVENOUS CONTINUOUS
Status: CANCELLED | OUTPATIENT
Start: 2021-05-03

## 2021-05-05 ENCOUNTER — APPOINTMENT (OUTPATIENT)
Dept: LAB | Facility: CLINIC | Age: 61
End: 2021-05-05
Payer: COMMERCIAL

## 2021-05-05 ENCOUNTER — TRANSCRIBE ORDERS (OUTPATIENT)
Dept: LAB | Facility: CLINIC | Age: 61
End: 2021-05-05

## 2021-05-05 DIAGNOSIS — I67.1 INTRACRANIAL ANEURYSM: ICD-10-CM

## 2021-05-05 LAB
ANION GAP SERPL CALCULATED.3IONS-SCNC: 6 MMOL/L (ref 4–13)
APTT PPP: 31 SECONDS (ref 23–37)
BACTERIA UR QL AUTO: ABNORMAL /HPF
BASOPHILS # BLD AUTO: 0.06 THOUSANDS/ΜL (ref 0–0.1)
BASOPHILS NFR BLD AUTO: 1 % (ref 0–1)
BILIRUB UR QL STRIP: NEGATIVE
BUN SERPL-MCNC: 19 MG/DL (ref 5–25)
CALCIUM SERPL-MCNC: 8.8 MG/DL (ref 8.3–10.1)
CHLORIDE SERPL-SCNC: 112 MMOL/L (ref 100–108)
CLARITY UR: ABNORMAL
CO2 SERPL-SCNC: 24 MMOL/L (ref 21–32)
COLOR UR: YELLOW
CREAT SERPL-MCNC: 0.7 MG/DL (ref 0.6–1.3)
EOSINOPHIL # BLD AUTO: 0.14 THOUSAND/ΜL (ref 0–0.61)
EOSINOPHIL NFR BLD AUTO: 2 % (ref 0–6)
ERYTHROCYTE [DISTWIDTH] IN BLOOD BY AUTOMATED COUNT: 13 % (ref 11.6–15.1)
EST. AVERAGE GLUCOSE BLD GHB EST-MCNC: 120 MG/DL
GFR SERPL CREATININE-BSD FRML MDRD: 93 ML/MIN/1.73SQ M
GLUCOSE P FAST SERPL-MCNC: 95 MG/DL (ref 65–99)
GLUCOSE UR STRIP-MCNC: NEGATIVE MG/DL
HBA1C MFR BLD: 5.8 %
HCT VFR BLD AUTO: 46.1 % (ref 34.8–46.1)
HGB BLD-MCNC: 14.9 G/DL (ref 11.5–15.4)
HGB UR QL STRIP.AUTO: ABNORMAL
HYALINE CASTS #/AREA URNS LPF: ABNORMAL /LPF
IMM GRANULOCYTES # BLD AUTO: 0.01 THOUSAND/UL (ref 0–0.2)
IMM GRANULOCYTES NFR BLD AUTO: 0 % (ref 0–2)
INR PPP: 0.99 (ref 0.84–1.19)
KETONES UR STRIP-MCNC: NEGATIVE MG/DL
LEUKOCYTE ESTERASE UR QL STRIP: ABNORMAL
LYMPHOCYTES # BLD AUTO: 1.7 THOUSANDS/ΜL (ref 0.6–4.47)
LYMPHOCYTES NFR BLD AUTO: 29 % (ref 14–44)
MCH RBC QN AUTO: 30.5 PG (ref 26.8–34.3)
MCHC RBC AUTO-ENTMCNC: 32.3 G/DL (ref 31.4–37.4)
MCV RBC AUTO: 94 FL (ref 82–98)
MONOCYTES # BLD AUTO: 0.56 THOUSAND/ΜL (ref 0.17–1.22)
MONOCYTES NFR BLD AUTO: 10 % (ref 4–12)
NEUTROPHILS # BLD AUTO: 3.4 THOUSANDS/ΜL (ref 1.85–7.62)
NEUTS SEG NFR BLD AUTO: 58 % (ref 43–75)
NITRITE UR QL STRIP: NEGATIVE
NON-SQ EPI CELLS URNS QL MICRO: ABNORMAL /HPF
NRBC BLD AUTO-RTO: 0 /100 WBCS
PH UR STRIP.AUTO: 5.5 [PH]
PLATELET # BLD AUTO: 280 THOUSANDS/UL (ref 149–390)
PMV BLD AUTO: 11 FL (ref 8.9–12.7)
POTASSIUM SERPL-SCNC: 4 MMOL/L (ref 3.5–5.3)
PROT UR STRIP-MCNC: NEGATIVE MG/DL
PROTHROMBIN TIME: 13.1 SECONDS (ref 11.6–14.5)
RBC # BLD AUTO: 4.89 MILLION/UL (ref 3.81–5.12)
RBC #/AREA URNS AUTO: ABNORMAL /HPF
SODIUM SERPL-SCNC: 142 MMOL/L (ref 136–145)
SP GR UR STRIP.AUTO: 1.02 (ref 1–1.03)
UROBILINOGEN UR QL STRIP.AUTO: 0.2 E.U./DL
WBC # BLD AUTO: 5.87 THOUSAND/UL (ref 4.31–10.16)
WBC #/AREA URNS AUTO: ABNORMAL /HPF

## 2021-05-05 PROCEDURE — 80048 BASIC METABOLIC PNL TOTAL CA: CPT

## 2021-05-05 PROCEDURE — 85730 THROMBOPLASTIN TIME PARTIAL: CPT

## 2021-05-05 PROCEDURE — 36415 COLL VENOUS BLD VENIPUNCTURE: CPT

## 2021-05-05 PROCEDURE — 85610 PROTHROMBIN TIME: CPT

## 2021-05-05 PROCEDURE — 81001 URINALYSIS AUTO W/SCOPE: CPT

## 2021-05-05 PROCEDURE — 85025 COMPLETE CBC W/AUTO DIFF WBC: CPT

## 2021-05-05 PROCEDURE — 83036 HEMOGLOBIN GLYCOSYLATED A1C: CPT

## 2021-05-06 ENCOUNTER — TELEPHONE (OUTPATIENT)
Dept: RADIOLOGY | Facility: HOSPITAL | Age: 61
End: 2021-05-06

## 2021-05-06 NOTE — PRE-PROCEDURE INSTRUCTIONS
Phone Consult completed:Pre procedure instructions for Cerebral A-gram with intervention reviewed with verbal understanding  Allergies,meds,NPO, and ride  Approximate arrival time given,SDS phone call evening before procedure  Covid screening completed,patient going for Covid screening test 5/10/21

## 2021-05-07 ENCOUNTER — TRANSCRIBE ORDERS (OUTPATIENT)
Dept: LAB | Facility: CLINIC | Age: 61
End: 2021-05-07

## 2021-05-10 DIAGNOSIS — Z11.59 SCREENING FOR VIRAL DISEASE: ICD-10-CM

## 2021-05-10 PROCEDURE — U0003 INFECTIOUS AGENT DETECTION BY NUCLEIC ACID (DNA OR RNA); SEVERE ACUTE RESPIRATORY SYNDROME CORONAVIRUS 2 (SARS-COV-2) (CORONAVIRUS DISEASE [COVID-19]), AMPLIFIED PROBE TECHNIQUE, MAKING USE OF HIGH THROUGHPUT TECHNOLOGIES AS DESCRIBED BY CMS-2020-01-R: HCPCS | Performed by: NEUROLOGICAL SURGERY

## 2021-05-10 PROCEDURE — U0005 INFEC AGEN DETEC AMPLI PROBE: HCPCS | Performed by: NEUROLOGICAL SURGERY

## 2021-05-11 LAB — SARS-COV-2 RNA RESP QL NAA+PROBE: NEGATIVE

## 2021-05-13 ENCOUNTER — TELEPHONE (OUTPATIENT)
Dept: SURGERY | Facility: HOSPITAL | Age: 61
End: 2021-05-13

## 2021-05-14 ENCOUNTER — ANESTHESIA (OUTPATIENT)
Dept: RADIOLOGY | Facility: HOSPITAL | Age: 61
End: 2021-05-14
Payer: COMMERCIAL

## 2021-05-14 ENCOUNTER — HOSPITAL ENCOUNTER (OUTPATIENT)
Dept: RADIOLOGY | Facility: HOSPITAL | Age: 61
Discharge: HOME | End: 2021-05-14
Attending: NEUROLOGICAL SURGERY | Admitting: NEUROLOGICAL SURGERY
Payer: COMMERCIAL

## 2021-05-14 ENCOUNTER — ANESTHESIA EVENT (OUTPATIENT)
Dept: RADIOLOGY | Facility: HOSPITAL | Age: 61
End: 2021-05-14
Payer: COMMERCIAL

## 2021-05-14 VITALS
WEIGHT: 170 LBS | TEMPERATURE: 98.2 F | BODY MASS INDEX: 30.12 KG/M2 | OXYGEN SATURATION: 93 % | SYSTOLIC BLOOD PRESSURE: 108 MMHG | HEART RATE: 81 BPM | HEIGHT: 63 IN | RESPIRATION RATE: 16 BRPM | DIASTOLIC BLOOD PRESSURE: 65 MMHG

## 2021-05-14 DIAGNOSIS — I67.1 INTRACRANIAL ANEURYSM: ICD-10-CM

## 2021-05-14 LAB
GLUCOSE SERPL-MCNC: 144 MG/DL (ref 65–140)
KCT BLD-ACNC: 132 SEC (ref 89–137)
KCT BLD-ACNC: 189 SEC (ref 89–137)
KCT BLD-ACNC: 190 SEC (ref 89–137)
KCT BLD-ACNC: 195 SEC (ref 89–137)
KCT BLD-ACNC: 201 SEC (ref 89–137)
SPECIMEN SOURCE: ABNORMAL
SPECIMEN SOURCE: NORMAL

## 2021-05-14 PROCEDURE — C1887 CATHETER, GUIDING: HCPCS

## 2021-05-14 PROCEDURE — 36216 PLACE CATHETER IN ARTERY: CPT | Performed by: NEUROLOGICAL SURGERY

## 2021-05-14 PROCEDURE — 36228 PLACE CATH INTRACRANIAL ART: CPT | Performed by: NEUROLOGICAL SURGERY

## 2021-05-14 PROCEDURE — 76377 3D RENDER W/INTRP POSTPROCES: CPT | Performed by: NEUROLOGICAL SURGERY

## 2021-05-14 PROCEDURE — 82948 REAGENT STRIP/BLOOD GLUCOSE: CPT

## 2021-05-14 PROCEDURE — 75774 ARTERY X-RAY EACH VESSEL: CPT | Performed by: NEUROLOGICAL SURGERY

## 2021-05-14 PROCEDURE — 85347 COAGULATION TIME ACTIVATED: CPT

## 2021-05-14 PROCEDURE — C1760 CLOSURE DEV, VASC: HCPCS

## 2021-05-14 PROCEDURE — 36225 PLACE CATH SUBCLAVIAN ART: CPT | Performed by: NEUROLOGICAL SURGERY

## 2021-05-14 PROCEDURE — C1894 INTRO/SHEATH, NON-LASER: HCPCS

## 2021-05-14 PROCEDURE — 36223 PLACE CATH CAROTID/INOM ART: CPT | Performed by: NEUROLOGICAL SURGERY

## 2021-05-14 PROCEDURE — C1769 GUIDE WIRE: HCPCS

## 2021-05-14 PROCEDURE — 36227 PLACE CATH XTRNL CAROTID: CPT

## 2021-05-14 PROCEDURE — 61624 TCAT PERM OCCLS/EMBOLJ CNS: CPT | Performed by: NEUROLOGICAL SURGERY

## 2021-05-14 PROCEDURE — 36223 PLACE CATH CAROTID/INOM ART: CPT

## 2021-05-14 PROCEDURE — 36226 PLACE CATH VERTEBRAL ART: CPT

## 2021-05-14 RX ORDER — FENTANYL CITRATE/PF 50 MCG/ML
25 SYRINGE (ML) INJECTION
Status: DISCONTINUED | OUTPATIENT
Start: 2021-05-14 | End: 2021-05-14 | Stop reason: HOSPADM

## 2021-05-14 RX ORDER — ROCURONIUM BROMIDE 10 MG/ML
INJECTION, SOLUTION INTRAVENOUS AS NEEDED
Status: DISCONTINUED | OUTPATIENT
Start: 2021-05-14 | End: 2021-05-14

## 2021-05-14 RX ORDER — HYDROCODONE BITARTRATE AND ACETAMINOPHEN 5; 325 MG/1; MG/1
1 TABLET ORAL EVERY 6 HOURS PRN
Status: DISCONTINUED | OUTPATIENT
Start: 2021-05-14 | End: 2021-05-14 | Stop reason: HOSPADM

## 2021-05-14 RX ORDER — SODIUM CHLORIDE 9 MG/ML
INJECTION, SOLUTION INTRAVENOUS CONTINUOUS PRN
Status: DISCONTINUED | OUTPATIENT
Start: 2021-05-14 | End: 2021-05-14

## 2021-05-14 RX ORDER — MIDAZOLAM HYDROCHLORIDE 2 MG/2ML
INJECTION, SOLUTION INTRAMUSCULAR; INTRAVENOUS AS NEEDED
Status: DISCONTINUED | OUTPATIENT
Start: 2021-05-14 | End: 2021-05-14

## 2021-05-14 RX ORDER — SODIUM CHLORIDE 9 MG/ML
125 INJECTION, SOLUTION INTRAVENOUS CONTINUOUS
Status: DISCONTINUED | OUTPATIENT
Start: 2021-05-14 | End: 2021-05-14 | Stop reason: HOSPADM

## 2021-05-14 RX ORDER — SODIUM CHLORIDE 9 MG/ML
75 INJECTION, SOLUTION INTRAVENOUS CONTINUOUS
Status: DISCONTINUED | OUTPATIENT
Start: 2021-05-14 | End: 2021-05-14 | Stop reason: HOSPADM

## 2021-05-14 RX ORDER — FENTANYL CITRATE 50 UG/ML
INJECTION, SOLUTION INTRAMUSCULAR; INTRAVENOUS AS NEEDED
Status: DISCONTINUED | OUTPATIENT
Start: 2021-05-14 | End: 2021-05-14

## 2021-05-14 RX ORDER — NEOSTIGMINE METHYLSULFATE 1 MG/ML
INJECTION INTRAVENOUS AS NEEDED
Status: DISCONTINUED | OUTPATIENT
Start: 2021-05-14 | End: 2021-05-14

## 2021-05-14 RX ORDER — ONDANSETRON 2 MG/ML
4 INJECTION INTRAMUSCULAR; INTRAVENOUS ONCE AS NEEDED
Status: COMPLETED | OUTPATIENT
Start: 2021-05-14 | End: 2021-05-14

## 2021-05-14 RX ORDER — EPHEDRINE SULFATE 50 MG/ML
INJECTION INTRAVENOUS AS NEEDED
Status: DISCONTINUED | OUTPATIENT
Start: 2021-05-14 | End: 2021-05-14

## 2021-05-14 RX ORDER — HEPARIN SODIUM 1000 [USP'U]/ML
INJECTION, SOLUTION INTRAVENOUS; SUBCUTANEOUS AS NEEDED
Status: DISCONTINUED | OUTPATIENT
Start: 2021-05-14 | End: 2021-05-14

## 2021-05-14 RX ORDER — ONDANSETRON 2 MG/ML
INJECTION INTRAMUSCULAR; INTRAVENOUS AS NEEDED
Status: DISCONTINUED | OUTPATIENT
Start: 2021-05-14 | End: 2021-05-14

## 2021-05-14 RX ORDER — LIDOCAINE HYDROCHLORIDE 10 MG/ML
INJECTION, SOLUTION EPIDURAL; INFILTRATION; INTRACAUDAL; PERINEURAL AS NEEDED
Status: DISCONTINUED | OUTPATIENT
Start: 2021-05-14 | End: 2021-05-14

## 2021-05-14 RX ORDER — PROPOFOL 10 MG/ML
INJECTION, EMULSION INTRAVENOUS AS NEEDED
Status: DISCONTINUED | OUTPATIENT
Start: 2021-05-14 | End: 2021-05-14

## 2021-05-14 RX ORDER — VERAPAMIL HYDROCHLORIDE 2.5 MG/ML
INJECTION, SOLUTION INTRAVENOUS CODE/TRAUMA/SEDATION MEDICATION
Status: COMPLETED | OUTPATIENT
Start: 2021-05-14 | End: 2021-05-14

## 2021-05-14 RX ORDER — DEXAMETHASONE SODIUM PHOSPHATE 10 MG/ML
INJECTION, SOLUTION INTRAMUSCULAR; INTRAVENOUS AS NEEDED
Status: DISCONTINUED | OUTPATIENT
Start: 2021-05-14 | End: 2021-05-14

## 2021-05-14 RX ORDER — GLYCOPYRROLATE 0.2 MG/ML
INJECTION INTRAMUSCULAR; INTRAVENOUS AS NEEDED
Status: DISCONTINUED | OUTPATIENT
Start: 2021-05-14 | End: 2021-05-14

## 2021-05-14 RX ORDER — METOCLOPRAMIDE HYDROCHLORIDE 5 MG/ML
10 INJECTION INTRAMUSCULAR; INTRAVENOUS ONCE AS NEEDED
Status: COMPLETED | OUTPATIENT
Start: 2021-05-14 | End: 2021-05-14

## 2021-05-14 RX ADMIN — PHENYLEPHRINE HYDROCHLORIDE 40 MCG/MIN: 10 INJECTION INTRAVENOUS at 09:32

## 2021-05-14 RX ADMIN — Medication 25 MCG: at 14:30

## 2021-05-14 RX ADMIN — PROPOFOL 80 MG: 10 INJECTION, EMULSION INTRAVENOUS at 12:12

## 2021-05-14 RX ADMIN — ROCURONIUM BROMIDE 10 MG: 50 INJECTION, SOLUTION INTRAVENOUS at 11:39

## 2021-05-14 RX ADMIN — PROPOFOL 150 MG: 10 INJECTION, EMULSION INTRAVENOUS at 09:28

## 2021-05-14 RX ADMIN — NEOSTIGMINE METHYLSULFATE 3 MG: 1 INJECTION INTRAVENOUS at 12:56

## 2021-05-14 RX ADMIN — EPHEDRINE SULFATE 5 MG: 50 INJECTION, SOLUTION INTRAVENOUS at 09:36

## 2021-05-14 RX ADMIN — PHENYLEPHRINE HYDROCHLORIDE 100 MCG: 10 INJECTION INTRAVENOUS at 09:36

## 2021-05-14 RX ADMIN — Medication 25 MCG: at 14:25

## 2021-05-14 RX ADMIN — SUGAMMADEX 100 MG: 100 INJECTION, SOLUTION INTRAVENOUS at 12:59

## 2021-05-14 RX ADMIN — MIDAZOLAM 2 MG: 1 INJECTION INTRAMUSCULAR; INTRAVENOUS at 09:23

## 2021-05-14 RX ADMIN — LIDOCAINE HYDROCHLORIDE 50 MG: 10 INJECTION, SOLUTION EPIDURAL; INFILTRATION; INTRACAUDAL; PERINEURAL at 09:28

## 2021-05-14 RX ADMIN — SODIUM CHLORIDE: 0.9 INJECTION, SOLUTION INTRAVENOUS at 09:35

## 2021-05-14 RX ADMIN — VERAPAMIL HYDROCHLORIDE 10 MG: 2.5 INJECTION, SOLUTION INTRAVENOUS at 10:25

## 2021-05-14 RX ADMIN — ONDANSETRON 4 MG: 2 INJECTION INTRAMUSCULAR; INTRAVENOUS at 15:12

## 2021-05-14 RX ADMIN — ROCURONIUM BROMIDE 20 MG: 50 INJECTION, SOLUTION INTRAVENOUS at 12:13

## 2021-05-14 RX ADMIN — ROCURONIUM BROMIDE 50 MG: 50 INJECTION, SOLUTION INTRAVENOUS at 09:28

## 2021-05-14 RX ADMIN — HEPARIN SODIUM 5000 UNITS: 1000 INJECTION INTRAVENOUS; SUBCUTANEOUS at 10:03

## 2021-05-14 RX ADMIN — ONDANSETRON 4 MG: 2 INJECTION INTRAMUSCULAR; INTRAVENOUS at 12:43

## 2021-05-14 RX ADMIN — FENTANYL CITRATE 100 MCG: 50 INJECTION INTRAMUSCULAR; INTRAVENOUS at 09:28

## 2021-05-14 RX ADMIN — HEPARIN SODIUM 1000 UNITS: 1000 INJECTION INTRAVENOUS; SUBCUTANEOUS at 11:42

## 2021-05-14 RX ADMIN — ROCURONIUM BROMIDE 10 MG: 50 INJECTION, SOLUTION INTRAVENOUS at 10:25

## 2021-05-14 RX ADMIN — SODIUM CHLORIDE 75 ML/HR: 0.9 INJECTION, SOLUTION INTRAVENOUS at 07:24

## 2021-05-14 RX ADMIN — PHENYLEPHRINE HYDROCHLORIDE 50 MCG: 10 INJECTION INTRAVENOUS at 10:28

## 2021-05-14 RX ADMIN — IODIXANOL 95 ML: 320 INJECTION, SOLUTION INTRAVASCULAR at 14:07

## 2021-05-14 RX ADMIN — GLYCOPYRROLATE 0.4 MG: 0.2 INJECTION, SOLUTION INTRAMUSCULAR; INTRAVENOUS at 12:56

## 2021-05-14 RX ADMIN — METOCLOPRAMIDE HYDROCHLORIDE 10 MG: 5 INJECTION INTRAMUSCULAR; INTRAVENOUS at 16:22

## 2021-05-14 RX ADMIN — DEXAMETHASONE SODIUM PHOSPHATE 10 MG: 10 INJECTION, SOLUTION INTRAMUSCULAR; INTRAVENOUS at 10:07

## 2021-05-14 NOTE — H&P
Patient seen and examined independently  Clinic note from 3/17 remains current  Patient is not on any new medications and has not had any new medical problems  Patient remains on asa81  /76   Pulse 68   Temp 98 °F (36 7 °C) (Oral)   Resp 18   Ht 5' 3" (1 6 m)   Wt 77 1 kg (170 lb)   SpO2 98%   BMI 30 11 kg/m²  On exam, patient is neurologically intact  Heart rate is regular  Breath sounds are clear    Plan to proceed with cerebral arteriogram and treatment of left PICA aneurysm

## 2021-05-14 NOTE — SEDATION DOCUMENTATION
Cerebral angiogram performed by Dr Rosana Do with anesthesia  Mynx closure device in place with knee immobilizer on right leg  Dressing clean, dry and intact after transfer  Bedside report given to PACU RN

## 2021-05-14 NOTE — PROGRESS NOTES
Patient seen and examined in PACU  She had an episode of nausea and vomiting  Complains of lower of Abdominal discomfort  Has voided  Soft abdomen on exam  Neurologically intact  We discussed her attempted treatment today  Will continue to observe her in pacu  If improves over the next hour or so will dc home  If not will keep overnight for obs

## 2021-05-14 NOTE — ANESTHESIA POSTPROCEDURE EVALUATION
Post-Op Assessment Note    CV Status:  Stable  Pain Score: 0    Pain management: adequate     Mental Status:  Alert and awake   Hydration Status:  Euvolemic   PONV Controlled:  Controlled   Airway Patency:  Patent      Post Op Vitals Reviewed: Yes      Staff: CRNA         No complications documented      BP   123/63   Temp (!) (P) 97 °F (36 1 °C) (05/14/21 1322)    Pulse  60   Resp 13 (05/14/21 1322)    SpO2 100 % (05/14/21 1322)

## 2021-05-14 NOTE — ANESTHESIA PROCEDURE NOTES
Arterial Line Insertion  Performed by: Juan Machado CRNA  Authorized by: Newton Baron MD   Consent: Verbal consent obtained  Written consent obtained  Risks and benefits: risks, benefits and alternatives were discussed  Consent given by: patient  Patient understanding: patient states understanding of the procedure being performed  Patient consent: the patient's understanding of the procedure matches consent given  Procedure consent: procedure consent matches procedure scheduled  Relevant documents: relevant documents present and verified  Patient identity confirmed: arm band and hospital-assigned identification number  Preparation: Patient was prepped and draped in the usual sterile fashion  Indications: hemodynamic monitoring  Orientation:  Left  Location: radial artery  Sedation:  Patient sedated: GETA  Procedure Details:  Needle gauge: 20  Number of attempts: 1    Post-procedure:  Post-procedure: dressing applied  Waveform: good waveform and waveform confirmed  Post-procedure CNS: unchanged  Patient tolerance: Patient tolerated the procedure well with no immediate complications  Comments:  Inserted by Tamara Seek

## 2021-05-14 NOTE — ANESTHESIA PREPROCEDURE EVALUATION
Procedure:  IR CEREBRAL ANGIOGRAPHY / INTERVENTION    Relevant Problems   CARDIO   (+) Essential hypertension      GI/HEPATIC   (+) GERD (gastroesophageal reflux disease)   NPO verified; Well controlled GERD   HTN- takes metoprolol last taken today       1/11/21 TTE   SUMMARY     LEFT VENTRICLE:  Systolic function was normal  Ejection fraction was estimated to be 60 %  There were no regional wall motion abnormalities  Physical Exam    Airway    Mallampati score: III  TM Distance: >3 FB  Neck ROM: full     Dental   Comment: Multiple missing teeth; Denies any loose ,     Cardiovascular      Pulmonary      Other Findings        Anesthesia Plan  ASA Score- 3     Anesthesia Type- general with ASA Monitors  Additional Monitors: arterial line  Airway Plan: ETT  Plan Factors-Exercise tolerance (METS): >4 METS  Chart reviewed  EKG reviewed  Imaging results reviewed  Existing labs reviewed  Patient summary reviewed  Induction- intravenous  Postoperative Plan- Plan for postoperative opioid use  Planned trial extubation    Informed Consent- Anesthetic plan and risks discussed with patient  I personally reviewed this patient with the CRNA  Discussed and agreed on the Anesthesia Plan with the CRNA  Elida Osuna

## 2021-05-14 NOTE — DISCHARGE INSTRUCTIONS
Today, you underwent a cerebral angiogram under the care of Dr Petar Castrejon for evaluation of aneurysm  ? The following instructions will help you care for yourself, or be cared for upon your return home  These are guidelines for your care right after your surgery only  ? Notify Your Doctor or Nurse if you have any of the following:  ? SYMPTOMS OF WOUND INFECTION--   Increased pain in or around the incision   Swelling around the incision  Any drainage from the incision  Incision separates or opens up  Warmth in the tissues around the incision  Redness or tenderness on the skin near the incision   Fever (temperature greater than 101 degrees F)   ? NEUROLOGICAL CHANGES--  Change in alertness  Increased sleepiness   Nausea and vomiting   New onset of numbness or weakness in arms or legs   New problems with your bowels or bladder  New or worse problems with balance or walking  Seizures, new or worsening  ? UNRELIEVED HEADACHE PAIN--  New or increased pain unrelieved with pain medications   Pain associated with nausea and vomiting   Pain associated with other symptoms  ? QUESTIONS OR PROBLEMS--  Any questions or problems that you are unsure about  Wound Care:  Keep Incision Clean and Dry   You may shower daily, but do not soak incision  Pat dry after showering  No tub baths, soaking, swimming for 1 week after angiogram    You do not need to cover the incision  Mild to moderate bruising and tenderness to the site is expected and may last up to 1-2 weeks after your procedure  ?  A closure device was placed at the catheter insertion site  This is MRI compatible  Remove the dressing 24 hours after your procedure  If your groin site is bleeding, apply firm pressure for 10 minutes  Reinforce dressing rather than removing and checking frequently  If continues to bleed through the dressing after 1 hour, contact your neurosurgeon's office  Anticipatory Education:  ?   PAIN MED W/ Acetaminophen (Tylenol)  --IF a prescription for pain medicine has been sent home with you:  --Narcotic pain medication may cause constipation  Be sure to take stool softeners or laxatives while you are on narcotic pain medication  --Do not drive after taking prescription pain medicine  ?  If this medicine is too strong, or no longer necessary, or we did NOT recommend/prescribe oral narcotics, you may take:   - Tylenol Extra-strength/Acetaminophen, 2 tablets every 4-6 hours as needed for mild pain  DO NOT TAKE MORE THAN 4000MG PER DAY from combined sources  NOTE: Remember to eat when taking pain medicines in order to avoid nausea  Watch for constipation  Eat plenty of fruits, vegetables, juices, and drink 6-8 glasses of water each day  Constipation: Stay active and drink at least 6-8 cups of fluid each day to prevent constipation  If you need a laxative or stool softener follow the package directions or consult with your local pharmacists if you have questions  ? After anesthesia, rest for 24 hours  Do not drive, drink alcohol beverages or make any important decisions during this time  General anesthesia may cause sore throat, jaw discomfort or muscle aches  These symptoms can last for one or two days  Activity: Please follow these instructions:  Advance your activity as you can tolerate  You may do light house work; nothing strenuous   You may walk all you want  You may go up and down the steps  Use the railing for support  Do not do excessive bending, straining or heavy lifting for 48 hours after your procedure  Do not drive or return to work until you are instructed   It is normal for your energy level and sleep patterns to change after surgery  Get extra sleep at night and take naps during the day to help you feel less tired  Take rest periods during the day  Complete recovery may take several weeks  ?  You may resume driving after 24-46 hours recovery  You may return to work after 48 hours of recovery     ?  Diet:  Your doctor has recommended that you follow these diet instructions at home  Refer to the patient education materials you received during your hospital stay  If you would like more nutrition counseling, ask your doctor about making an appointment with an outpatient dietitian  Resume your home diet  ? Medications:  Please resume your home medications as instructed  ? Home Supplies and Equipment:  none  Additional Contacts:  ? CONTACTS FOR NEUROSURGERY: You may call your neurosurgeons office if you have questions between 8:30 am and 4:30 pm  You may request to speak to the nurse practitioner who is available Monday through Friday  ?  For off hours or the weekend you may call your neurosurgeon's office to leave a message

## 2021-05-18 ENCOUNTER — TELEPHONE (OUTPATIENT)
Dept: NEUROSURGERY | Facility: CLINIC | Age: 61
End: 2021-05-18

## 2021-05-18 NOTE — TELEPHONE ENCOUNTER
----- Message from Alexandra Rivas MD sent at 5/18/2021  8:00 AM EDT -----  Regarding: RE: Check on patient  We didn't treat her this past Friday  Not sure I would waste the FMLA in case we try again in the near future  Can we finagle her in to clinic on tomorrow or next week?    esm  ----- Message -----  From: Lise Segura RN  Sent: 5/17/2021   9:12 AM EDT  To: Heaven Manning RN, Alexandra Rivas MD, #  Subject: RE: Check on patient                             Gave patient a call to see how she was doing today  She said that she doesn't feel great overall because she is still experiencing nausea but has not vomited since yesterday  Denies any headaches, dizziness, or changes to her vision or mental status  She has a lot of soreness in the back of her neck which she describes as a muscular soreness  She denies any issues with the puncture sites and she is up and walking and going to the bathroom without any difficulty  Patient wanted to know what the next steps were from here? She already submitted her FMLA paperwork and wanted to know if you were going to be planning any other interventions? She said she would rather not have to file again and can always file for an extension of her current leave if needed  I advised her that I would speak with Dr Greg Nix and be back in touch with her once I heard from him    ----- Message -----  From: Naga Goss RN  Sent: 5/14/2021   4:34 PM EDT  To: Heaven Manning RN, Lise Segura RN, #  Subject: Check on patient                                 Emma Pablo suarez,     Dr Greg Nix requested that someone check in with this patient on Monday 5/17/2021  Apparently she did not tolerate the procedure well and it was aborted  He asked that someone check on her Monday, if one of you could that it would be appreciated! Thank you!

## 2021-05-18 NOTE — TELEPHONE ENCOUNTER
LM for patient to call back at her convenience to offer her an appt with Dr Sudheer Bocanegra for Monday to discuss her options  Awaiting call back

## 2021-05-24 ENCOUNTER — OFFICE VISIT (OUTPATIENT)
Dept: NEUROSURGERY | Facility: CLINIC | Age: 61
End: 2021-05-24
Payer: COMMERCIAL

## 2021-05-24 VITALS
BODY MASS INDEX: 31.54 KG/M2 | DIASTOLIC BLOOD PRESSURE: 78 MMHG | RESPIRATION RATE: 16 BRPM | HEIGHT: 63 IN | HEART RATE: 63 BPM | TEMPERATURE: 97.8 F | WEIGHT: 178 LBS | SYSTOLIC BLOOD PRESSURE: 140 MMHG

## 2021-05-24 DIAGNOSIS — I67.1 INTRACRANIAL ANEURYSM: Primary | ICD-10-CM

## 2021-05-24 PROCEDURE — 99214 OFFICE O/P EST MOD 30 MIN: CPT | Performed by: NEUROLOGICAL SURGERY

## 2021-05-24 RX ORDER — SODIUM CHLORIDE 9 MG/ML
75 INJECTION, SOLUTION INTRAVENOUS CONTINUOUS
Status: CANCELLED | OUTPATIENT
Start: 2021-05-24

## 2021-05-24 NOTE — PROGRESS NOTES
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 and reconstitution through left thyrocervical trunk and occipital artery  Now status post attempted embolization of aneurysm on 05/14/2021  I was unable to catheterize the distal PICA due to the tortuosity of her vessels  We an extensive conversation today regarding her vascular anatomy as well as our options for treatment  These included continued observation, which would be 1 year with a CTA  Surgical resection of craniotomy and clipping of the aneurysm, however we discussed upsets the risks of this in detail  As well as flow diversion/decreasing flow through embolization of the occipital artery  In order to do this she would have to have a repeat angiogram in approximately 3 months to ensure that she has sufficient collateral flow to her posterior circulation and a balloon test occlusion  We discussed the risks and benefits associated with this including bleeding, vascular injury, stroke  She signed consent today and will further decide if she wants to undergo this  She will discuss with her   If she does choose to do this we will plan and August/September  Should she wish to defer we will see her back in 1 year with a CTA      2  Hypertension    We discussed the importance of control of her blood pressure   I would like her blood pressure goal to be less than 140       I spent 40 minutes with the patient greater than 50 percent of which was spent in counseling  Chief Complaint: Follow-up        HPI:   This is a very pleasant 68-year-old female who presented to the emergency room with a sensation that her scalp was throbbing and hair hurt   She also had some dizziness with nausea as well as pins and needles in her hands and feet   She felt like her legs were heavy and her balance was poor  Dalton Thorne states that a similar episode happened several years ago   In addition in 2011 she had an episode with significant nausea and vomiting and diarrhea where she passed out  Dalton Thorne went to the emergency room and was admitted to the hospital at that time  Jae Garica reviewed a CT scan from that admission which demonstrates a hyperdensity in the same area of her more recently found aneurysm   It is possible that this represented hemorrhage versus the aneurysm itself   Her workup at that time was ultimately negative   She has had similar episodes this last before, most significantly a couple years ago      She returns to clinic today after an attempted embolization for distal PICA aneurysm  Unfortunately as unable to get distal access into her PICA due to tortuosity of the vessel  At the end of the procedure her collateral flow through her thyrocervical trunk appeared occluded however she did have robust flow through her collateral occipital artery  She has some mild left head pain since the procedure but no other neurologic changes  She also states that she had some small discharge from her groin access site    No other complaints at this time      Her past medical history significant for hypertension, hyperlipidemia   Her past surgical history significant for total hysterectomy with bladder injury      She states that she has no medical allergies  Jaz Porch is listed as an allergy but she states that this is not a true allergy but poorly tolerated      She is   Dalton Thorne has full children ages 39, 45, 36, and 43   She is employed as a  at [employer]   She quit smoking in 2007  She denies any significant alcohol usage   She denies any illicit drug usage      No family history of subarachnoid hemorrhage or sudden death  Review of systems obtained by the MA reviewed and updated below  Review of Systems   Constitutional: Negative  HENT: Negative  Eyes: Positive for visual disturbance (blurry vision)  Respiratory: Negative  Cardiovascular: Negative  Gastrointestinal: Positive for constipation (from medication)  Endocrine: Negative  Genitourinary: Negative  Musculoskeletal: Positive for back pain and neck pain  Negative for gait problem and myalgias  Skin: Negative  Allergic/Immunologic: Negative  Neurological: Positive for tremors (with her low sugar)  Negative for dizziness, seizures, weakness, light-headedness, numbness and headaches  Hematological: Bruises/bleeds easily (medication)  Psychiatric/Behavioral: Negative  Physical Exam  Vitals:    05/24/21 1509   BP: 140/78   Pulse: 63   Resp: 16   Temp: 97 8 °F (36 6 °C)   She is well appearing  Affect is appropriate  Body mass index is 31 53 kg/m²  Donna Prows She is awake alert and oriented  Hearing and vision are grossly intact  Her pupils are equal round reactive to light  Her extraocular movements are intact  Her face is symmetric  Tongue is midline  Facial sensation is intact and symmetric throughout  Shoulder shrug is 5/5  There is no drift or dysmetria  She has full strength in her bilateral upper and lower extremities  She has normal muscle tone muscle bulk  Her biceps reflexes and patellar reflexes are 2+ and symmetric  Hailee sign negative bilaterally  Sensation intact to light touch and pinprick throughout  Her gait is normal      Her heart rate is regular  Normal respiratory effort  2+ radial pulse  Groin site with mild bruising and firmness    No significant distal ecchymotic bruising along her inner thigh no drainage  The following portions of the patient's history were reviewed and updated as appropriate: allergies, current medications, past family history, past medical history, past social history, past surgical history and problem list     Active Ambulatory Problems     Diagnosis Date Noted    GERD (gastroesophageal reflux disease)     Essential hypertension     Stroke-like symptoms 01/09/2021    Intracranial aneurysm 01/09/2021     Resolved Ambulatory Problems     Diagnosis Date Noted    No Resolved Ambulatory Problems     Past Medical History:   Diagnosis Date    Hypertension     Urinary tract bacterial infections        Past Surgical History:   Procedure Laterality Date    ABDOMINAL SURGERY      COLONOSCOPY  2021    DILATION AND CURETTAGE, DIAGNOSTIC / THERAPEUTIC      HYSTERECTOMY      HYSTERECTOMY  2020    IR CEREBRAL ANGIOGRAPHY  2/5/2021    IR CEREBRAL ANGIOGRAPHY / INTERVENTION  5/14/2021    TONSILLECTOMY      TUBAL LIGATION           Current Outpatient Medications:     amLODIPine (NORVASC) 5 mg tablet, Take 5 mg by mouth daily, Disp: , Rfl:     aspirin 81 mg chewable tablet, Chew 1 tablet (81 mg total) daily, Disp: 30 tablet, Rfl: 0    cyanocobalamin (VITAMIN B-12) 1000 MCG tablet, Take 1,000 mcg by mouth as needed , Disp: , Rfl:     famotidine (PEPCID) 40 MG tablet, Take 40 mg by mouth daily, Disp: , Rfl:     metoprolol succinate (TOPROL-XL) 50 mg 24 hr tablet, Take 25 mg by mouth daily , Disp: , Rfl:     Multiple Vitamins-Minerals (MULTIVITAMIN ADULTS 50+ PO), Take 1 tablet by mouth daily -takes beet root and green vegg supplement, Disp: , Rfl:     Multiple Vitamins-Minerals (ZINC PO), Take 1 tablet by mouth as needed , Disp: , Rfl:     rosuvastatin (CRESTOR) 5 mg tablet, Take 1 tablet (5 mg total) by mouth daily (Patient not taking: Reported on 5/24/2021), Disp: 30 tablet, Rfl: 3    Results/Data: We reviewed her arteriogram in detail as well as the report

## 2021-05-31 ENCOUNTER — APPOINTMENT (EMERGENCY)
Dept: RADIOLOGY | Facility: HOSPITAL | Age: 61
End: 2021-05-31
Payer: COMMERCIAL

## 2021-05-31 ENCOUNTER — HOSPITAL ENCOUNTER (OUTPATIENT)
Facility: HOSPITAL | Age: 61
Setting detail: OBSERVATION
Discharge: HOME | End: 2021-06-01
Attending: EMERGENCY MEDICINE | Admitting: INTERNAL MEDICINE
Payer: COMMERCIAL

## 2021-05-31 ENCOUNTER — APPOINTMENT (OUTPATIENT)
Dept: RADIOLOGY | Facility: HOSPITAL | Age: 61
End: 2021-05-31
Payer: COMMERCIAL

## 2021-05-31 DIAGNOSIS — R20.0 NUMBNESS: Primary | ICD-10-CM

## 2021-05-31 DIAGNOSIS — M54.2 NECK PAIN ON LEFT SIDE: ICD-10-CM

## 2021-05-31 DIAGNOSIS — R29.90 STROKE-LIKE SYMPTOMS: ICD-10-CM

## 2021-05-31 DIAGNOSIS — R20.2 TINGLING OF LEFT UPPER EXTREMITY: ICD-10-CM

## 2021-05-31 DIAGNOSIS — I67.1 INTRACRANIAL ANEURYSM: ICD-10-CM

## 2021-05-31 DIAGNOSIS — I67.1 ANEURYSM OF POSTERIOR INFERIOR CEREBELLAR ARTERY: ICD-10-CM

## 2021-05-31 LAB
ANION GAP SERPL CALCULATED.3IONS-SCNC: 6 MMOL/L (ref 4–13)
APTT PPP: 32 SECONDS (ref 23–37)
BUN SERPL-MCNC: 16 MG/DL (ref 5–25)
CALCIUM SERPL-MCNC: 9.8 MG/DL (ref 8.3–10.1)
CHLORIDE SERPL-SCNC: 111 MMOL/L (ref 100–108)
CO2 SERPL-SCNC: 24 MMOL/L (ref 21–32)
CREAT SERPL-MCNC: 0.75 MG/DL (ref 0.6–1.3)
ERYTHROCYTE [DISTWIDTH] IN BLOOD BY AUTOMATED COUNT: 12.6 % (ref 11.6–15.1)
EST. AVERAGE GLUCOSE BLD GHB EST-MCNC: 123 MG/DL
GFR SERPL CREATININE-BSD FRML MDRD: 86 ML/MIN/1.73SQ M
GLUCOSE SERPL-MCNC: 104 MG/DL (ref 65–140)
HBA1C MFR BLD: 5.9 %
HCT VFR BLD AUTO: 46.8 % (ref 34.8–46.1)
HGB BLD-MCNC: 15.4 G/DL (ref 11.5–15.4)
INR PPP: 1.01 (ref 0.84–1.19)
MCH RBC QN AUTO: 30.3 PG (ref 26.8–34.3)
MCHC RBC AUTO-ENTMCNC: 32.9 G/DL (ref 31.4–37.4)
MCV RBC AUTO: 92 FL (ref 82–98)
PLATELET # BLD AUTO: 297 THOUSANDS/UL (ref 149–390)
PMV BLD AUTO: 10.6 FL (ref 8.9–12.7)
POTASSIUM SERPL-SCNC: 4 MMOL/L (ref 3.5–5.3)
PROTHROMBIN TIME: 13.3 SECONDS (ref 11.6–14.5)
RBC # BLD AUTO: 5.08 MILLION/UL (ref 3.81–5.12)
SODIUM SERPL-SCNC: 141 MMOL/L (ref 136–145)
TROPONIN I SERPL-MCNC: <0.02 NG/ML
WBC # BLD AUTO: 5.53 THOUSAND/UL (ref 4.31–10.16)

## 2021-05-31 PROCEDURE — 85730 THROMBOPLASTIN TIME PARTIAL: CPT | Performed by: STUDENT IN AN ORGANIZED HEALTH CARE EDUCATION/TRAINING PROGRAM

## 2021-05-31 PROCEDURE — 70551 MRI BRAIN STEM W/O DYE: CPT

## 2021-05-31 PROCEDURE — 99220 PR INITIAL OBSERVATION CARE/DAY 70 MINUTES: CPT | Performed by: INTERNAL MEDICINE

## 2021-05-31 PROCEDURE — 70496 CT ANGIOGRAPHY HEAD: CPT

## 2021-05-31 PROCEDURE — G1004 CDSM NDSC: HCPCS

## 2021-05-31 PROCEDURE — 99285 EMERGENCY DEPT VISIT HI MDM: CPT

## 2021-05-31 PROCEDURE — 84484 ASSAY OF TROPONIN QUANT: CPT | Performed by: STUDENT IN AN ORGANIZED HEALTH CARE EDUCATION/TRAINING PROGRAM

## 2021-05-31 PROCEDURE — 70498 CT ANGIOGRAPHY NECK: CPT

## 2021-05-31 PROCEDURE — 93005 ELECTROCARDIOGRAM TRACING: CPT

## 2021-05-31 PROCEDURE — NC001 PR NO CHARGE: Performed by: PHYSICIAN ASSISTANT

## 2021-05-31 PROCEDURE — 36415 COLL VENOUS BLD VENIPUNCTURE: CPT | Performed by: STUDENT IN AN ORGANIZED HEALTH CARE EDUCATION/TRAINING PROGRAM

## 2021-05-31 PROCEDURE — 85027 COMPLETE CBC AUTOMATED: CPT | Performed by: STUDENT IN AN ORGANIZED HEALTH CARE EDUCATION/TRAINING PROGRAM

## 2021-05-31 PROCEDURE — 99285 EMERGENCY DEPT VISIT HI MDM: CPT | Performed by: EMERGENCY MEDICINE

## 2021-05-31 PROCEDURE — 80048 BASIC METABOLIC PNL TOTAL CA: CPT | Performed by: STUDENT IN AN ORGANIZED HEALTH CARE EDUCATION/TRAINING PROGRAM

## 2021-05-31 PROCEDURE — 85610 PROTHROMBIN TIME: CPT | Performed by: STUDENT IN AN ORGANIZED HEALTH CARE EDUCATION/TRAINING PROGRAM

## 2021-05-31 PROCEDURE — 83036 HEMOGLOBIN GLYCOSYLATED A1C: CPT | Performed by: INTERNAL MEDICINE

## 2021-05-31 RX ORDER — ACETAMINOPHEN 325 MG/1
650 TABLET ORAL EVERY 4 HOURS PRN
Status: DISCONTINUED | OUTPATIENT
Start: 2021-05-31 | End: 2021-06-01 | Stop reason: HOSPADM

## 2021-05-31 RX ORDER — FAMOTIDINE 20 MG/1
40 TABLET, FILM COATED ORAL DAILY
Status: DISCONTINUED | OUTPATIENT
Start: 2021-06-01 | End: 2021-06-01 | Stop reason: HOSPADM

## 2021-05-31 RX ORDER — METOPROLOL SUCCINATE 25 MG/1
25 TABLET, EXTENDED RELEASE ORAL DAILY
Status: DISCONTINUED | OUTPATIENT
Start: 2021-06-01 | End: 2021-06-01 | Stop reason: HOSPADM

## 2021-05-31 RX ORDER — ATORVASTATIN CALCIUM 40 MG/1
40 TABLET, FILM COATED ORAL EVERY EVENING
Status: DISCONTINUED | OUTPATIENT
Start: 2021-06-01 | End: 2021-06-01 | Stop reason: HOSPADM

## 2021-05-31 RX ORDER — ASPIRIN 81 MG/1
81 TABLET, CHEWABLE ORAL DAILY
Status: DISCONTINUED | OUTPATIENT
Start: 2021-06-01 | End: 2021-06-01 | Stop reason: HOSPADM

## 2021-05-31 RX ADMIN — ACETAMINOPHEN 650 MG: 325 TABLET, FILM COATED ORAL at 17:09

## 2021-05-31 RX ADMIN — IOHEXOL 85 ML: 350 INJECTION, SOLUTION INTRAVENOUS at 12:16

## 2021-05-31 NOTE — CASE MANAGEMENT
LOS: Day 1  Bundle: pt is not a bundle  Readmission risk: pt is not a 30 day readmit    CM reviewed role with pt and pt's spouse/emergency contact, Carmella Julio ([phone]) at bedside  Pt reported that she lives in a 2 story home with 2 NITHYA and approx 12 steps between floors  Pt reported that she resides on the 1st floor only with bed and bath accessible as well  Pt reported that she was IPTA with ADLs, pt drives and works  Pt reported that she has grab bars installed in the bathroom and has access to a walker from her spouse's previous injury  No reported hx of VNA, STR or OPPT  PCP is Dr Solorzano Record; pharmacy of choice is [facility] in [place]  No reported hx of MH or D&A  Pt reported no LW/POA on file  Spouse to transport pt if recommended for home  CM reviewed d/c planning process including the following: identifying help at home, patient preference for d/c planning needs, Discharge Lounge, [facility] Meds to Bed program, availability of treatment team to discuss questions or concerns patient and/or family may have regarding understanding medications and recognizing signs and symptoms once discharged  CM also encouraged patient to follow up with all recommended appointments after discharge  Patient advised of importance for patient and family to participate in managing patients medical well being  Patient/caregiver received discharge checklist  Content reviewed  Patient/caregiver encouraged to participate in discharge plan of care prior to discharge home

## 2021-05-31 NOTE — ED PROVIDER NOTES
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 and reconstitution through left thyrocervical trunk and occipital artery, who presents to the emergency department for left-sided neck pain and left arm numbness/tingling  Patient states the symptoms started 3 days ago while lying down  She describes the pain is beginning just under her left ear and moving down the left side of her neck  There are no modifying factors  The sensation in her left arm has also been present for the same amount of time, and states it involves her entire arm  She denies any weakness, and just states that her fingers feel cold  She states the symptoms have been constant, but worsened yesterday  She denies any prior history of symptoms like this in the past   She denies any headaches, vision changes, nausea, vomiting, diarrhea, chest pain, shortness of breath, or any other new or worsening symptoms  History provided by:  Patient   used: No        Prior to Admission Medications   Prescriptions Last Dose Informant Patient Reported? Taking?    Multiple Vitamins-Minerals (MULTIVITAMIN ADULTS 50+ PO)   Yes No   Sig: Take 1 tablet by mouth daily -takes beet root and green vegg supplement   Multiple Vitamins-Minerals (ZINC PO)   Yes No   Sig: Take 1 tablet by mouth as needed    amLODIPine (NORVASC) 5 mg tablet   Yes No   Sig: Take 5 mg by mouth daily   aspirin 81 mg chewable tablet   No No   Sig: Chew 1 tablet (81 mg total) daily   cyanocobalamin (VITAMIN B-12) 1000 MCG tablet   Yes No   Sig: Take 1,000 mcg by mouth as needed    famotidine (PEPCID) 40 MG tablet   Yes No   Sig: Take 40 mg by mouth daily   metoprolol succinate (TOPROL-XL) 50 mg 24 hr tablet Self Yes No   Sig: Take 25 mg by mouth daily    rosuvastatin (CRESTOR) 5 mg tablet   No No   Sig: Take 1 tablet (5 mg total) by mouth daily   Patient not taking: Reported on 2021      Facility-Administered Medications: None       Past Medical History:   Diagnosis Date    GERD (gastroesophageal reflux disease)     Hypertension     Urinary tract bacterial infections        Past Surgical History:   Procedure Laterality Date    ABDOMINAL SURGERY      COLONOSCOPY      DILATION AND CURETTAGE, DIAGNOSTIC / THERAPEUTIC      HYSTERECTOMY      HYSTERECTOMY      IR CEREBRAL ANGIOGRAPHY  2021    IR CEREBRAL ANGIOGRAPHY / INTERVENTION  2021    TONSILLECTOMY      TUBAL LIGATION         Family History   Problem Relation Age of Onset    Hypertension Mother     Hypertension Father      I have reviewed and agree with the history as documented  E-Cigarette/Vaping    E-Cigarette Use Never User      E-Cigarette/Vaping Substances    Nicotine No     THC No     CBD No     Flavoring No     Other No     Unknown No      Social History     Tobacco Use    Smoking status: Former Smoker     Types: Cigarettes     Quit date: 4/15/2007     Years since quittin 1    Smokeless tobacco: Never Used   Substance Use Topics    Alcohol use: Yes     Frequency: Monthly or less     Comment: social    Drug use: Yes     Types: Marijuana     Comment: Marijuana at 14and 23years old  Review of Systems   Constitutional: Negative for chills and fever  Eyes: Negative for redness and itching  Respiratory: Negative for cough and shortness of breath  Cardiovascular: Negative for chest pain and leg swelling  Gastrointestinal: Negative for abdominal pain, diarrhea, nausea and vomiting  Musculoskeletal: Positive for neck pain  Negative for back pain and neck stiffness  Skin: Negative for rash and wound  Neurological: Positive for numbness   Negative for dizziness, weakness, light-headedness and headaches  All other systems reviewed and are negative  Physical Exam  ED Triage Vitals [05/31/21 0953]   Temperature Pulse Respirations Blood Pressure SpO2   97 8 °F (36 6 °C) 77 16 (!) 194/77 99 %      Temp Source Heart Rate Source Patient Position - Orthostatic VS BP Location FiO2 (%)   Oral Monitor Lying Left arm --      Pain Score       6             Orthostatic Vital Signs  Vitals:    05/31/21 0953 05/31/21 1011 05/31/21 1223   BP: (!) 194/77 165/80 133/70   Pulse: 77 62 61   Patient Position - Orthostatic VS: Lying Lying Lying       Physical Exam  Vitals signs and nursing note reviewed  Constitutional:       General: She is not in acute distress  Appearance: She is well-developed  She is not diaphoretic  HENT:      Head: Normocephalic and atraumatic  Right Ear: External ear normal       Left Ear: External ear normal       Nose: Nose normal    Eyes:      General: Lids are normal  No scleral icterus  Neck:      Musculoskeletal: Normal range of motion and neck supple  Cardiovascular:      Rate and Rhythm: Normal rate and regular rhythm  Heart sounds: Normal heart sounds  No murmur  No friction rub  No gallop  Pulmonary:      Effort: Pulmonary effort is normal  No respiratory distress  Breath sounds: Normal breath sounds  No wheezing or rales  Abdominal:      Palpations: Abdomen is soft  Tenderness: There is no abdominal tenderness  There is no guarding or rebound  Musculoskeletal: Normal range of motion  General: No deformity  Skin:     General: Skin is warm and dry  Capillary Refill: Capillary refill takes less than 2 seconds  Neurological:      General: No focal deficit present  Mental Status: She is alert and oriented to person, place, and time  GCS: GCS eye subscore is 4  GCS verbal subscore is 5  GCS motor subscore is 6  Cranial Nerves: Cranial nerves are intact  No cranial nerve deficit or facial asymmetry        Sensory: Sensation is intact  No sensory deficit  Motor: Motor function is intact  No weakness     Psychiatric:         Mood and Affect: Mood normal          Behavior: Behavior normal          ED Medications  Medications   aspirin chewable tablet 81 mg (has no administration in time range)   famotidine (PEPCID) tablet 40 mg (has no administration in time range)   metoprolol succinate (TOPROL-XL) 24 hr tablet 25 mg (has no administration in time range)   acetaminophen (TYLENOL) tablet 650 mg (has no administration in time range)   atorvastatin (LIPITOR) tablet 40 mg (has no administration in time range)   enoxaparin (LOVENOX) subcutaneous injection 40 mg (has no administration in time range)   iohexol (OMNIPAQUE) 350 MG/ML injection (MULTI-DOSE) 85 mL (85 mL Intravenous Given 5/31/21 1216)       Diagnostic Studies  Results Reviewed     Procedure Component Value Units Date/Time    Hemoglobin A1c w/EAG Estimation [[id number]]  (Abnormal) Collected: 05/31/21 1012    Lab Status: Final result Specimen: Blood from Arm, Left Updated: 05/31/21 1401     Hemoglobin A1C 5 9 %       mg/dl     Troponin I [[id number]]  (Normal) Collected: 05/31/21 1012    Lab Status: Final result Specimen: Blood from Arm, Left Updated: 05/31/21 1047     Troponin I <0 02 ng/mL     Protime-INR [[id number]]  (Normal) Collected: 05/31/21 1012    Lab Status: Final result Specimen: Blood from Arm, Left Updated: 05/31/21 1046     Protime 13 3 seconds      INR 1 01    APTT [[id number]]  (Normal) Collected: 05/31/21 1012    Lab Status: Final result Specimen: Blood from Arm, Left Updated: 05/31/21 1046     PTT 32 seconds     Basic metabolic panel [[id number]]  (Abnormal) Collected: 05/31/21 1012    Lab Status: Final result Specimen: Blood from Arm, Left Updated: 05/31/21 1044     Sodium 141 mmol/L      Potassium 4 0 mmol/L      Chloride 111 mmol/L      CO2 24 mmol/L      ANION GAP 6 mmol/L      BUN 16 mg/dL      Creatinine 0 75 mg/dL      Glucose 104 mg/dL      Calcium 9 8 mg/dL      eGFR 86 ml/min/1 73sq m     Narrative:      [place] guidelines for Chronic Kidney Disease (CKD):     Stage 1 with normal or high GFR (GFR > 90 mL/min/1 73 square meters)    Stage 2 Mild CKD (GFR = 60-89 mL/min/1 73 square meters)    Stage 3A Moderate CKD (GFR = 45-59 mL/min/1 73 square meters)    Stage 3B Moderate CKD (GFR = 30-44 mL/min/1 73 square meters)    Stage 4 Severe CKD (GFR = 15-29 mL/min/1 73 square meters)    Stage 5 End Stage CKD (GFR <15 mL/min/1 73 square meters)  Note: GFR calculation is accurate only with a steady state creatinine    CBC and Platelet [[id number]]  (Abnormal) Collected: 05/31/21 1012    Lab Status: Final result Specimen: Blood from Arm, Left Updated: 05/31/21 1022     WBC 5 53 Thousand/uL      RBC 5 08 Million/uL      Hemoglobin 15 4 g/dL      Hematocrit 46 8 %      MCV 92 fL      MCH 30 3 pg      MCHC 32 9 g/dL      RDW 12 6 %      Platelets 016 Thousands/uL      MPV 10 6 fL                  CTA head and neck with and without contrast   Final Result by Jamey Shore DO (05/31 1241)      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  On noncontrast imaging, the density has increased from the prior study  This suggests some degree of thrombosis in the interval   On CTA imaging, the    enhancing portion of the aneurysm has decreased in size further suggestive of partial thrombosis in the interval       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  The vessel occludes intracranially following the origin of PICA  No acute intracranial abnormality                    Workstation performed: [id number]         MRI Inpatient Order    (Results Pending)         Procedures  ECG 12 Lead Documentation Only    Date/Time: 5/31/2021 9:54 AM  Performed by: Scott Kimball DO  Authorized by: Scott Kimball DO     ECG reviewed by me, the ED Provider: yes    Patient location:  ED  Interpretation:     Interpretation: normal    Rate:     ECG rate:  69    ECG rate assessment: normal    Rhythm:     Rhythm: sinus rhythm    Ectopy:     Ectopy: none    QRS:     QRS axis:  Normal  Conduction:     Conduction: normal    ST segments:     ST segments:  Normal  T waves:     T waves: normal            ED Course  ED Course as of May 31 1404   Mon May 31, 2021   1013 Blood Pressure: 165/80   1023 WBC: 5 53   1023 Hemoglobin: 15 4   1044 Glucose, Random: 104   1044 Sodium: 141   1046 Protime-INR   1046 APTT   1059 Troponin I: <0 02   1233 Blood Pressure: 133/70   1309 CTA head and neck with and without contrast                             SBIRT 22yo+      Most Recent Value   SBIRT (23 yo +)   In order to provide better care to our patients, we are screening all of our patients for alcohol and drug use  Would it be okay to ask you these screening questions? No Filed at: 05/31/2021 1015                MDM  Number of Diagnoses or Management Options  Aneurysm of posterior inferior cerebellar artery:   Neck pain on left side:   Numbness:   Tingling of left upper extremity:   Diagnosis management comments: Patient is a 56 y o  female who presented to the ED for left-sided neck pain and left upper extremity numbness/tingling in the setting of a known PICA aneurysm  In the ED, patient was not tachycardic, hypertensive, not tachypneic and afebrile  Physical exam was unremarkable  Plan:  CT CTA head and neck with and without contrast, CBC, BMP, coags, EKG, troponin    ED interventions:  None (patient declined pain medication)    Findings:  Imaging showed possible thrombosis in the aneurysm    Discussed case with Dr Alcocer Life  Recommends MRI as well as starting/continuing baby aspirin  Reassessment: Patient was re-evaluated  Resting comfortably  Continues to decline pain medications    Blood pressure improved to 133/70    Disposition:  Admit for MRI        Portions of the record may have been created with voice recognition software  Occasional wrong word or "sound a like" substitutions may have occurred due to the inherent limitations of voice recognition software  Read the chart carefully and recognize, using context, where substitutions have occurred           Amount and/or Complexity of Data Reviewed  Clinical lab tests: ordered and reviewed  Tests in the radiology section of CPT®: ordered and reviewed  Tests in the medicine section of CPT®: ordered and reviewed  Decide to obtain previous medical records or to obtain history from someone other than the patient: yes  Obtain history from someone other than the patient: yes  Review and summarize past medical records: yes  Discuss the patient with other providers: yes  Independent visualization of images, tracings, or specimens: yes    Risk of Complications, Morbidity, and/or Mortality  Presenting problems: high  Diagnostic procedures: high  Management options: high    Patient Progress  Patient progress: stable      Disposition  Final diagnoses:   Numbness   Aneurysm of posterior inferior cerebellar artery   Tingling of left upper extremity   Neck pain on left side     Time reflects when diagnosis was documented in both MDM as applicable and the Disposition within this note     Time User Action Codes Description Comment    5/31/2021 12:09 PM Gercorrine Sheehanaris Add [R20 0] Numbness     5/31/2021  1:34 PM Violeta Lopes Add [R29 90] Stroke-like symptoms     5/31/2021  2:00 PM Gerlean Aguilaaris Add [I67 1] Aneurysm of posterior inferior cerebellar artery     5/31/2021  2:01 PM Gerlean Ferraris Add [R20 2] Tingling of left upper extremity     5/31/2021  2:01 PM Gerlean Ferraris Add [M54 2] Neck pain on left side       ED Disposition     ED Disposition Condition Date/Time Comment    Admit Stable Mon May 31, 2021 12:08 PM Case was discussed with and the patient's admission status was agreed to be Admission Status: inpatient status to the service of Dr Etelvina Brady    None         Patient's Medications   Discharge Prescriptions    No medications on file     No discharge procedures on file  PDMP Review     None           ED Provider  Attending physically available and evaluated Dana Linn I managed the patient along with the ED Attending      Electronically Signed by         Reinaldo Osuna DO  05/31/21 6838

## 2021-05-31 NOTE — H&P
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 and reconstitution through left thyroid cervical trunk and occipital artery, status post IR embolization/coiling attempt on 05/14, unsuccessful  - now presenting with subjective weakness/numbness of left upper extremity and intermittent blurry vision with headache, worse over past few days  - CTA head/neck: "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  On noncontrast imaging, the density has increased from the prior study  This suggests some degree of thrombosis in the interval   On CTA imaging, the enhancing portion of the aneurysm has decreased in size further suggestive of partial thrombosis in the interval  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  The vessel occludes intracranially following the origin of PICA   No acute intracranial abnormality "  - Case was discussed with Neurosurgery and recommendation for admission for stroke workup and MRI brain  - neurology consulted  - check MRI brain and echocardiogram  - PT/OT/SLP eval  - telemetry  - A1c and lipid panel  - continue baby aspirin, start statin  - neuro checks  - permissive htn     Intracranial aneurysm  Assessment & Plan  - see plan above, neuro surgery evaluation    Essential hypertension  Assessment & Plan  Continue Toprol, hold amlodipine for now in the setting of possible CVA, follow blood pressures    GERD (gastroesophageal reflux disease)  Assessment & Plan  Continue Pepcid      VTE Prophylaxis: Enoxaparin (Lovenox)  / sequential compression device   Code Status: Full  POLST: POLST form is not discussed and not completed at this time  Discussion with family: Patient and  at bedside     Anticipated Length of Stay:  Patient will be admitted on an Observation basis with an anticipated length of stay of  < 2 midnights  Justification for Hospital Stay: Possible CVA    Total Time for Visit, including Counseling / Coordination of Care: 45 minutes  Greater than 50% of this total time spent on direct patient counseling and coordination of care  Chief Complaint:   Headache, left upper extremity weakness/numbness    History of Present Illness:    Benigno Linn is a 56 y o  female with history of hypertension, GERD, known intracranial aneurysm, who presents for worsened headache, left upper extremity weakness/numbness  She has known left PICA 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 and reconstitution through left thyroid cervical trunk and occipital artery, status post IR embolization/coiling attempt on 05/14, unsuccessful  - now presenting with subjective weakness/numbness of left upper extremity and intermittent blurry vision with headache, worse over past few days  Patient also complains of intermittent blurry vision  Denies any issues with speech, fever, chills, nausea, vomiting, chest pain  She does complain of headache and left-sided neck discomfort  In the ED, afebrile, heart rate 60s-70s, respirations 20, blood pressure 615-613D systolic  Labs pertinent for negative troponin, creatinine 0 75, WBC 5 5, hemoglobin 15 4  CTA head/neck showed known left PICA aneurysm with possible thrombosis  Neurosurgery was contacted and recommended stroke admission with MRI brain  Review of Systems:    Review of Systems   Constitutional: Negative for chills, fever and unexpected weight change     HENT: Negative for congestion, rhinorrhea, sneezing and sore throat  Blurry vision   Respiratory: Negative for cough, shortness of breath and wheezing  Cardiovascular: Negative for chest pain, palpitations and leg swelling  Gastrointestinal: Negative for abdominal pain, constipation, diarrhea, nausea and vomiting  Endocrine: Negative for cold intolerance and heat intolerance  Genitourinary: Negative for dysuria  Musculoskeletal: Negative for arthralgias  Headache, neck pain   Allergic/Immunologic: Negative for immunocompromised state  Neurological: Positive for headaches  Negative for dizziness and numbness  Left upper extremity weakness/numbness  Blurry vision       Past Medical and Surgical History:     Past Medical History:   Diagnosis Date    GERD (gastroesophageal reflux disease)     Hypertension     Urinary tract bacterial infections        Past Surgical History:   Procedure Laterality Date    ABDOMINAL SURGERY      COLONOSCOPY  2021    DILATION AND CURETTAGE, DIAGNOSTIC / THERAPEUTIC      HYSTERECTOMY      HYSTERECTOMY  2020    IR CEREBRAL ANGIOGRAPHY  2/5/2021    IR CEREBRAL ANGIOGRAPHY / INTERVENTION  5/14/2021    TONSILLECTOMY      TUBAL LIGATION         Meds/Allergies:    Prior to Admission medications    Medication Sig Start Date End Date Taking?  Authorizing Provider   amLODIPine (NORVASC) 5 mg tablet Take 5 mg by mouth daily    Historical Provider, MD   aspirin 81 mg chewable tablet Chew 1 tablet (81 mg total) daily 1/11/21 5/24/21  CHRISTOPHER Echols   cyanocobalamin (VITAMIN B-12) 1000 MCG tablet Take 1,000 mcg by mouth as needed     Historical Provider, MD   famotidine (PEPCID) 40 MG tablet Take 40 mg by mouth daily 12/10/19   Historical Provider, MD   metoprolol succinate (TOPROL-XL) 50 mg 24 hr tablet Take 25 mg by mouth daily  12/10/19   Historical Provider, MD   Multiple Vitamins-Minerals (MULTIVITAMIN ADULTS 50+ PO) Take 1 tablet by mouth daily -takes beet root and green vegg supplement Historical Provider, MD   Multiple Vitamins-Minerals (ZINC PO) Take 1 tablet by mouth as needed     Historical Provider, MD   rosuvastatin (CRESTOR) 5 mg tablet Take 1 tablet (5 mg total) by mouth daily  Patient not taking: Reported on 2021   Rosa Schmid MD     I have reviewed home medications with patient personally  Allergies: No Known Allergies    Social History:     Marital Status: /Civil Union   Patient Pre-hospital Living Situation:  Lives at home with   Patient Pre-hospital Level of Mobility:  Independent  Patient Pre-hospital Diet Restrictions:  Cardiac  Substance Use History:   Social History     Substance and Sexual Activity   Alcohol Use Yes    Frequency: Monthly or less    Comment: social     Social History     Tobacco Use   Smoking Status Former Smoker    Types: Cigarettes    Quit date: 4/15/2007    Years since quittin 1   Smokeless Tobacco Never Used     Social History     Substance and Sexual Activity   Drug Use Yes    Types: Marijuana    Comment: Marijuana at 14and 23years old  Family History:    non-contributory    Physical Exam:     Vitals:   Blood Pressure: 133/70 (21 1223)  Pulse: 61 (21 1223)  Temperature: 97 8 °F (36 6 °C) (21 0953)  Temp Source: Oral (21 0953)  Respirations: 20 (21 1223)  Height: 5' 3" (160 cm) (21 1012)  Weight - Scale: 80 3 kg (177 lb) (21 1012)  SpO2: 97 % (21 1223)    Physical Exam  Constitutional:       General: She is not in acute distress  Appearance: She is well-developed  She is not diaphoretic  HENT:      Head: Normocephalic and atraumatic  Mouth/Throat:      Mouth: Mucous membranes are moist       Pharynx: Oropharynx is clear  No oropharyngeal exudate  Eyes:      General: No scleral icterus  Cardiovascular:      Rate and Rhythm: Normal rate and regular rhythm  Heart sounds: Normal heart sounds  No murmur     Pulmonary:      Effort: Pulmonary effort is normal  No respiratory distress  Breath sounds: Normal breath sounds  No wheezing  Abdominal:      General: Bowel sounds are normal  There is no distension  Palpations: Abdomen is soft  Tenderness: There is no abdominal tenderness  Musculoskeletal:      Right lower leg: No edema  Left lower leg: No edema  Neurological:      General: No focal deficit present  Mental Status: She is alert and oriented to person, place, and time  Comments: Subjective numbness/weakness of left upper extremity  Otherwise no focal deficits       Additional Data:     Lab Results: I have personally reviewed pertinent reports  Results from last 7 days   Lab Units 05/31/21  1012   WBC Thousand/uL 5 53   HEMOGLOBIN g/dL 15 4   HEMATOCRIT % 46 8*   PLATELETS Thousands/uL 297     Results from last 7 days   Lab Units 05/31/21  1012   SODIUM mmol/L 141   POTASSIUM mmol/L 4 0   CHLORIDE mmol/L 111*   CO2 mmol/L 24   BUN mg/dL 16   CREATININE mg/dL 0 75   ANION GAP mmol/L 6   CALCIUM mg/dL 9 8   GLUCOSE RANDOM mg/dL 104     Results from last 7 days   Lab Units 05/31/21  1012   INR  1 01                   Imaging: I have personally reviewed pertinent reports  and I have personally reviewed pertinent films in PACS    CTA head and neck with and without contrast   Final Result by Jenny Mondragon DO (05/31 1241)      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  On noncontrast imaging, the density has increased from the prior study  This suggests some degree of thrombosis in the interval   On CTA imaging, the    enhancing portion of the aneurysm has decreased in size further suggestive of partial thrombosis in the interval       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  The vessel occludes intracranially following the origin of PICA  No acute intracranial abnormality  Workstation performed: [id number]         MRI Inpatient Order    (Results Pending)       Allscripts / Epic Records Reviewed: Yes     ** Please Note: This note has been constructed using a voice recognition system   **

## 2021-05-31 NOTE — ED NOTES
Pt indicated "My family has a lot of vascular issues   I figured I should let you guys know"     Pamela Alegria RN  05/31/21 5615

## 2021-05-31 NOTE — TELEMEDICINE
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

## 2021-05-31 NOTE — ED ATTENDING ATTESTATION
5/31/2021  IIsabella MD, saw and evaluated the patient  I have discussed the patient with the resident/non-physician practitioner and agree with the resident's/non-physician practitioner's findings, Plan of Care, and MDM as documented in the resident's/non-physician practitioner's note, except where noted  All available labs and Radiology studies were reviewed  I was present for key portions of any procedure(s) performed by the resident/non-physician practitioner and I was immediately available to provide assistance  At this point I agree with the current assessment done in the Emergency Department  I have conducted an independent evaluation of this patient a history and physical is as follows:  38-year-old female coming in with pain on left side of her neck as well as decreased sensation in her left arm  Patient has recent history of attempt at PICA aneurysm clipping, but it was unsuccessful  Patient denies gait dysfunction  States that the arm is additionally weak    Exam at this time is nonfocal   Will plan to CT, CTA, discussed with neurology and anticipate admission for MRI  ED Course         Critical Care Time  Procedures

## 2021-05-31 NOTE — ASSESSMENT & PLAN NOTE
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 and reconstitution through left thyroid cervical trunk and occipital artery, status post IR embolization/coiling attempt on 05/14, unsuccessful  - now presenting with subjective weakness/numbness of left upper extremity and intermittent blurry vision with headache, worse over past few days  - CTA head/neck: "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  On noncontrast imaging, the density has increased from the prior study  This suggests some degree of thrombosis in the interval   On CTA imaging, the enhancing portion of the aneurysm has decreased in size further suggestive of partial thrombosis in the interval  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  The vessel occludes intracranially following the origin of PICA   No acute intracranial abnormality "  - Case was discussed with Neurosurgery and recommendation for admission for stroke workup and MRI brain  - neurology consulted  - check MRI brain and echocardiogram  - PT/OT/SLP eval  - telemetry  - A1c and lipid panel  - continue baby aspirin, start statin  - neuro checks  - permissive htn

## 2021-06-01 ENCOUNTER — APPOINTMENT (OUTPATIENT)
Dept: NON INVASIVE DIAGNOSTICS | Facility: HOSPITAL | Age: 61
End: 2021-06-01
Payer: COMMERCIAL

## 2021-06-01 VITALS
HEART RATE: 74 BPM | RESPIRATION RATE: 18 BRPM | SYSTOLIC BLOOD PRESSURE: 132 MMHG | WEIGHT: 177 LBS | TEMPERATURE: 97.8 F | OXYGEN SATURATION: 96 % | HEIGHT: 63 IN | BODY MASS INDEX: 31.36 KG/M2 | DIASTOLIC BLOOD PRESSURE: 108 MMHG

## 2021-06-01 LAB
ANION GAP SERPL CALCULATED.3IONS-SCNC: 3 MMOL/L (ref 4–13)
ATRIAL RATE: 69 BPM
BUN SERPL-MCNC: 14 MG/DL (ref 5–25)
CALCIUM SERPL-MCNC: 8.8 MG/DL (ref 8.3–10.1)
CHLORIDE SERPL-SCNC: 108 MMOL/L (ref 100–108)
CHOLEST SERPL-MCNC: 237 MG/DL (ref 50–200)
CO2 SERPL-SCNC: 29 MMOL/L (ref 21–32)
CREAT SERPL-MCNC: 0.69 MG/DL (ref 0.6–1.3)
ERYTHROCYTE [DISTWIDTH] IN BLOOD BY AUTOMATED COUNT: 12.6 % (ref 11.6–15.1)
GFR SERPL CREATININE-BSD FRML MDRD: 94 ML/MIN/1.73SQ M
GLUCOSE SERPL-MCNC: 106 MG/DL (ref 65–140)
HCT VFR BLD AUTO: 43.1 % (ref 34.8–46.1)
HDLC SERPL-MCNC: 37 MG/DL
HGB BLD-MCNC: 14.2 G/DL (ref 11.5–15.4)
LDLC SERPL CALC-MCNC: 174 MG/DL (ref 0–100)
MAGNESIUM SERPL-MCNC: 2.2 MG/DL (ref 1.6–2.6)
MCH RBC QN AUTO: 30.7 PG (ref 26.8–34.3)
MCHC RBC AUTO-ENTMCNC: 32.9 G/DL (ref 31.4–37.4)
MCV RBC AUTO: 93 FL (ref 82–98)
P AXIS: 48 DEGREES
PHOSPHATE SERPL-MCNC: 3.5 MG/DL (ref 2.3–4.1)
PLATELET # BLD AUTO: 269 THOUSANDS/UL (ref 149–390)
PMV BLD AUTO: 10.6 FL (ref 8.9–12.7)
POTASSIUM SERPL-SCNC: 3.7 MMOL/L (ref 3.5–5.3)
PR INTERVAL: 168 MS
QRS AXIS: 0 DEGREES
QRSD INTERVAL: 80 MS
QT INTERVAL: 390 MS
QTC INTERVAL: 417 MS
RBC # BLD AUTO: 4.62 MILLION/UL (ref 3.81–5.12)
SODIUM SERPL-SCNC: 140 MMOL/L (ref 136–145)
T WAVE AXIS: 43 DEGREES
TRIGL SERPL-MCNC: 129 MG/DL
VENTRICULAR RATE: 69 BPM
WBC # BLD AUTO: 4.52 THOUSAND/UL (ref 4.31–10.16)

## 2021-06-01 PROCEDURE — 93010 ELECTROCARDIOGRAM REPORT: CPT | Performed by: INTERNAL MEDICINE

## 2021-06-01 PROCEDURE — 99214 OFFICE O/P EST MOD 30 MIN: CPT | Performed by: PHYSICIAN ASSISTANT

## 2021-06-01 PROCEDURE — 99217 PR OBSERVATION CARE DISCHARGE MANAGEMENT: CPT | Performed by: PHYSICIAN ASSISTANT

## 2021-06-01 PROCEDURE — 83735 ASSAY OF MAGNESIUM: CPT | Performed by: INTERNAL MEDICINE

## 2021-06-01 PROCEDURE — 99205 OFFICE O/P NEW HI 60 MIN: CPT | Performed by: PSYCHIATRY & NEUROLOGY

## 2021-06-01 PROCEDURE — 80061 LIPID PANEL: CPT | Performed by: INTERNAL MEDICINE

## 2021-06-01 PROCEDURE — 84100 ASSAY OF PHOSPHORUS: CPT | Performed by: INTERNAL MEDICINE

## 2021-06-01 PROCEDURE — 97166 OT EVAL MOD COMPLEX 45 MIN: CPT

## 2021-06-01 PROCEDURE — 85027 COMPLETE CBC AUTOMATED: CPT | Performed by: INTERNAL MEDICINE

## 2021-06-01 PROCEDURE — 80048 BASIC METABOLIC PNL TOTAL CA: CPT | Performed by: INTERNAL MEDICINE

## 2021-06-01 PROCEDURE — NC001 PR NO CHARGE: Performed by: PHYSICIAN ASSISTANT

## 2021-06-01 PROCEDURE — 97162 PT EVAL MOD COMPLEX 30 MIN: CPT

## 2021-06-01 RX ORDER — SODIUM CHLORIDE, SODIUM LACTATE, POTASSIUM CHLORIDE, CALCIUM CHLORIDE 600; 310; 30; 20 MG/100ML; MG/100ML; MG/100ML; MG/100ML
75 INJECTION, SOLUTION INTRAVENOUS CONTINUOUS
Status: DISPENSED | OUTPATIENT
Start: 2021-06-01 | End: 2021-06-01

## 2021-06-01 RX ADMIN — FAMOTIDINE 40 MG: 20 TABLET ORAL at 09:25

## 2021-06-01 RX ADMIN — SODIUM CHLORIDE, SODIUM LACTATE, POTASSIUM CHLORIDE, AND CALCIUM CHLORIDE 75 ML/HR: .6; .31; .03; .02 INJECTION, SOLUTION INTRAVENOUS at 00:39

## 2021-06-01 RX ADMIN — ACETAMINOPHEN 650 MG: 325 TABLET, FILM COATED ORAL at 00:10

## 2021-06-01 RX ADMIN — ASPIRIN 81 MG: 81 TABLET, CHEWABLE ORAL at 09:25

## 2021-06-01 RX ADMIN — METOPROLOL SUCCINATE 25 MG: 25 TABLET, FILM COATED, EXTENDED RELEASE ORAL at 09:25

## 2021-06-01 NOTE — CONSULTS
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 reconstitution through the left thigh of cervical trunk and occipital artery  · Status post attempted embolization of aneurysm on May 14, 2021  Unable to catheterize aneurysm secondary to tortuous vessels  Imaging:   · CTA head and neck with without 5/31/2021:  Known left bike aneurysm located immediately posterior to lateral 4th ventricle  Some density increased from prior imaging suggesting degree of thrombus  He enhancing portion of aneurysm has decreased and thigh therefore likely partially thrombosed  Multiple areas of segmental occlusion of the left vertebral artery noted in neck  Muscle fluid intracranially following origin of PICA  No new findings  Plan:   · Continue monitor neurological exam    · CTA results reviewed with patient and  at bedside  · Patient was provided with treatment option for known aneurysm  At this time, she anticipates just close follow-up in his deferring any intervention  · For outpatient note, will plan for CTA in one year  * Stroke-like symptoms  Assessment & Plan  Patient presents with multiple from planes including left arm and face numbness    Imaging:  · MRI brain without 5/31:  No evidence of recent infarct  No left distal PICA aneurysm again identified with minimal surrounding T2 abnormality which is stable      Plan:  · Neurology consulted  · No neurosurgical intervention  · Continue ASA 81mg  · Discussed neck pain likely musculoskeletal   · Arm symptoms resolved without recurrence  Patient felt to be related with way she is sleeping  · Patient clear discharge from neurosurgical standpoint  Will plan for outpatient follow-up  History of Present Illness     HPI: Meri Linn is a 56 y o  female with PMH including hypertension, hyperlipidemia, left back aneurysm, history of total hysterectomy who presents with complaint of neck pain and left arm numbness  Patient admits to ongoing left neck pain since her attempted aneurysm embolization mid May  In addition she notes intermittent arm fullness and numbness most noted in the evenings early morning hours  She admits to dizziness with rotation of her head along with nausea over the last several days  Patient does complain of increasing sensitivity to high-pitched sound in her left ear  She notes chronic numbness on the left lateral aspect of her lower lip  In addition she admits to intermittent blurry vision of her left eye in the evening hours  Given patient's history of known aneurysm, she was concerned and presented to the hospital for evaluation  Presenting blood pressure 194/77  CTA completed with known left PICA aneurysm and possible thrombus without any acute findings  MRI brain since completed without any evidence of acute stroke  Patient denies any recurrence of her left arm symptoms since admission  She states she feels overall well though continues with neck pain  Admits to ambulating the hallway last evening without any difficulties  Review of Systems   Constitutional: Negative for activity change, fatigue and fever  HENT: Negative for trouble swallowing and voice change  Eyes: Negative for photophobia and visual disturbance  Respiratory: Negative for cough and shortness of breath  Cardiovascular: Negative for chest pain and leg swelling  Gastrointestinal: Positive for nausea  Negative for abdominal pain and vomiting     Genitourinary: Negative for decreased urine volume and difficulty urinating  Musculoskeletal: Positive for neck pain  Negative for back pain and gait problem  Skin: Negative for pallor, rash and wound  Neurological: Positive for dizziness and numbness  Negative for tremors, seizures, speech difficulty, weakness, light-headedness and headaches  Psychiatric/Behavioral: Negative for agitation and confusion  Historical Information   Past Medical History:   Diagnosis Date    GERD (gastroesophageal reflux disease)     Hypertension     Urinary tract bacterial infections      Past Surgical History:   Procedure Laterality Date    ABDOMINAL SURGERY      COLONOSCOPY      DILATION AND CURETTAGE, DIAGNOSTIC / THERAPEUTIC      HYSTERECTOMY      HYSTERECTOMY      IR CEREBRAL ANGIOGRAPHY  2021    IR CEREBRAL ANGIOGRAPHY / INTERVENTION  2021    TONSILLECTOMY      TUBAL LIGATION       Social History     Substance and Sexual Activity   Alcohol Use Yes    Frequency: Monthly or less    Comment: social     Social History     Substance and Sexual Activity   Drug Use Yes    Types: Marijuana    Comment: Marijuana at 14and 23years old        Social History     Tobacco Use   Smoking Status Former Smoker    Types: Cigarettes    Quit date: 4/15/2007    Years since quittin 1   Smokeless Tobacco Never Used     Family History   Problem Relation Age of Onset    Hypertension Mother     Hypertension Father        Meds/Allergies   all current active meds have been reviewed, current meds:   Current Facility-Administered Medications   Medication Dose Route Frequency    acetaminophen (TYLENOL) tablet 650 mg  650 mg Oral Q4H PRN    aspirin chewable tablet 81 mg  81 mg Oral Daily    atorvastatin (LIPITOR) tablet 40 mg  40 mg Oral QPM    enoxaparin (LOVENOX) subcutaneous injection 40 mg  40 mg Subcutaneous Daily    famotidine (PEPCID) tablet 40 mg  40 mg Oral Daily    metoprolol succinate (TOPROL-XL) 24 hr tablet 25 mg  25 mg Oral Daily    and PTA meds:   Prior to Admission Medications   Prescriptions Last Dose Informant Patient Reported? Taking? Multiple Vitamins-Minerals (MULTIVITAMIN ADULTS 50+ PO) 5/31/2021 at Unknown time  Yes Yes   Sig: Take 1 tablet by mouth daily -takes beet root and green vegg supplement   Multiple Vitamins-Minerals (ZINC PO) More than a month at Unknown time  Yes No   Sig: Take 1 tablet by mouth as needed    amLODIPine (NORVASC) 5 mg tablet 5/30/2021 at Unknown time  Yes Yes   Sig: Take 5 mg by mouth daily   aspirin 81 mg chewable tablet   No No   Sig: Chew 1 tablet (81 mg total) daily   cyanocobalamin (VITAMIN B-12) 1000 MCG tablet More than a month at Unknown time  Yes No   Sig: Take 1,000 mcg by mouth as needed    famotidine (PEPCID) 40 MG tablet 5/31/2021 at Unknown time  Yes Yes   Sig: Take 40 mg by mouth daily   metoprolol succinate (TOPROL-XL) 50 mg 24 hr tablet 5/31/2021 at Unknown time Self Yes Yes   Sig: Take 25 mg by mouth daily    rosuvastatin (CRESTOR) 5 mg tablet Not Taking at Unknown time  No No   Sig: Take 1 tablet (5 mg total) by mouth daily   Patient not taking: Reported on 5/24/2021      Facility-Administered Medications: None     No Known Allergies    Objective   I/O       05/30 0701 - 05/31 0700 05/31 0701 - 06/01 0700 06/01 0701 - 06/02 0700    P  O    460    Total Intake(mL/kg)   460 (5 7)    Net   +460                 Physical Exam  Constitutional:       General: She is not in acute distress  Appearance: Normal appearance  She is well-developed  She is not ill-appearing  HENT:      Head: Normocephalic and atraumatic  Nose: Nose normal       Mouth/Throat:      Mouth: Mucous membranes are moist    Eyes:      General: No scleral icterus  Right eye: No discharge  Left eye: No discharge  Extraocular Movements: Extraocular movements intact and EOM normal       Conjunctiva/sclera: Conjunctivae normal       Pupils: Pupils are equal, round, and reactive to light  Neck:      Musculoskeletal: Normal range of motion  Comments: Muscle tightness along the left trapezius  Cardiovascular:      Rate and Rhythm: Normal rate  Pulmonary:      Effort: Pulmonary effort is normal  No respiratory distress  Musculoskeletal:         General: Tenderness (Left trapezius) present  Skin:     General: Skin is warm and dry  Findings: No rash  Neurological:      Mental Status: She is alert  Coordination: Finger-Nose-Finger Test normal       Deep Tendon Reflexes: Strength normal    Psychiatric:         Mood and Affect: Mood normal          Speech: Speech normal          Behavior: Behavior normal          Thought Content: Thought content normal          Judgment: Judgment normal        Neurologic Exam     Mental Status   Follows 3 step commands  Attention: normal  Concentration: normal    Speech: speech is normal   Level of consciousness: alert  Knowledge: good  Normal comprehension  Cranial Nerves     CN III, IV, VI   Pupils are equal, round, and reactive to light  Extraocular motions are normal    Nystagmus: none   Upgaze: normal  Conjugate gaze: present    CN V   Facial sensation intact  CN VII   Facial expression full, symmetric  CN VIII   Hearing: intact    CN XI   Right trapezius strength: normal  Left trapezius strength: normal    CN XII   Tongue: not atrophic  Fasciculations: absent  Tongue deviation: none    Motor Exam   Muscle bulk: normal  Overall muscle tone: normal  Right arm pronator drift: absent  Left arm pronator drift: absent    Strength   Strength 5/5 throughout  Sensory Exam   Light touch normal      Gait, Coordination, and Reflexes     Coordination   Finger to nose coordination: normal    Tremor   Resting tremor: absent  Intention tremor: absent  Action tremor: absent      Vitals:Blood pressure (!) 132/108, pulse 74, temperature 97 8 °F (36 6 °C), temperature source Oral, resp   rate 18, height 5' 3" (1 6 m), weight 80 3 kg (177 lb), SpO2 96 %  ,Body mass index is 31 35 kg/m²  Lab Results:   Results from last 7 days   Lab Units 06/01/21  0456 05/31/21  1012   WBC Thousand/uL 4 52 5 53   HEMOGLOBIN g/dL 14 2 15 4   HEMATOCRIT % 43 1 46 8*   PLATELETS Thousands/uL 269 297     Results from last 7 days   Lab Units 06/01/21  0456 05/31/21  1012   POTASSIUM mmol/L 3 7 4 0   CHLORIDE mmol/L 108 111*   CO2 mmol/L 29 24   BUN mg/dL 14 16   CREATININE mg/dL 0 69 0 75   CALCIUM mg/dL 8 8 9 8     Results from last 7 days   Lab Units 06/01/21  0456   MAGNESIUM mg/dL 2 2     Results from last 7 days   Lab Units 06/01/21  0456   PHOSPHORUS mg/dL 3 5     Results from last 7 days   Lab Units 05/31/21  1012   INR  1 01   PTT seconds 32     No results found for: TROPONINT  ABG:No results found for: PHART, NCU2CDF, PO2ART, NVH3QQJ, N0PCKXUM, BEART, SOURCE    Imaging Studies: I have personally reviewed pertinent reports  and I have personally reviewed pertinent films in PACS    Cta Head And Neck With And Without Contrast    Result Date: 5/31/2021  Impression: 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  On noncontrast imaging, the density has increased from the prior study  This suggests some degree of thrombosis in the interval   On CTA imaging, the enhancing portion of the aneurysm has decreased in size further suggestive of partial thrombosis in the interval  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  The vessel occludes intracranially following the origin of PICA  No acute intracranial abnormality  Workstation performed: [id number]     Mri Brain Wo Contrast    Result Date: 6/1/2021  Impression: No evidence of recent infarct  Patient's known left distal PICA aneurysm is again identified and is essentially stable in size from the prior MRI  Minimal surrounding T2 abnormality in the cerebellar parenchyma is stable    Although when comparing prior CTs, this aneurysm appeared more dense on the current suggesting interval thrombosis, this finding cannot be confirmed on MRI  Workstation performed: [id number]       EKG, Pathology, and Other Studies: none    VTE Prophylaxis: Sequential compression device (Venodyne)  and Enoxaparin (Lovenox)    Code Status: Level 1 - Full Code  Advance Directive and Living Will:      Power of :    POLST:      Counseling / Coordination of Care  I spent 30 minutes with the patient

## 2021-06-01 NOTE — ASSESSMENT & PLAN NOTE
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 and reconstitution through left thyroid cervical trunk and occipital artery, status post IR embolization/coiling attempt on 05/14, unsuccessful  · CTA head/neck revealed, per the results report: "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  On noncontrast imaging, the density has increased from the prior study  This suggests some degree of thrombosis in the interval   On CTA imaging, the enhancing portion of the aneurysm has decreased in size further suggestive of partial thrombosis in the interval  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  The vessel occludes intracranially following the origin of PICA  No acute intracranial abnormality "  · MRI brain revealed, per the results report: "No evidence of recent infarct  Patient's known left distal PICA aneurysm is again identified and is essentially stable in size from the prior MRI  Minimal surrounding T2 abnormality in the cerebellar parenchyma is stable    Although when comparing prior CTs, this aneurysm appeared more dense on the current suggesting interval thrombosis, this finding cannot be confirmed on MRI "  · Neurology consult pending  · Neurosurgery consult pending  · Echo pending

## 2021-06-01 NOTE — ASSESSMENT & PLAN NOTE
Patient presents with multiple from planes including left arm and face numbness    Imaging:  · MRI brain without 5/31:  No evidence of recent infarct  No left distal PICA aneurysm again identified with minimal surrounding T2 abnormality which is stable  Plan:  · Neurology consulted  · No neurosurgical intervention  · Patient clear discharge from neurosurgical standpoint  Will plan for outpatient follow-up

## 2021-06-01 NOTE — PLAN OF CARE
Problem: Potential for Falls  Goal: Patient will remain free of falls  Description: INTERVENTIONS:  - Assess patient frequently for physical needs  -  Identify cognitive and physical deficits and behaviors that affect risk of falls    -  [place] fall precautions as indicated by assessment   - Educate patient/family on patient safety including physical limitations  - Instruct patient to call for assistance with activity based on assessment  - Modify environment to reduce risk of injury  - Consider OT/PT consult to assist with strengthening/mobility  Outcome: Progressing     Problem: PAIN - ADULT  Goal: Verbalizes/displays adequate comfort level or baseline comfort level  Description: Interventions:  - Encourage patient to monitor pain and request assistance  - Assess pain using appropriate pain scale  - Administer analgesics based on type and severity of pain and evaluate response  - Implement non-pharmacological measures as appropriate and evaluate response  - Consider cultural and social influences on pain and pain management  - Notify physician/advanced practitioner if interventions unsuccessful or patient reports new pain  Outcome: Progressing     Problem: INFECTION - ADULT  Goal: Absence or prevention of progression during hospitalization  Description: INTERVENTIONS:  - Assess and monitor for signs and symptoms of infection  - Monitor lab/diagnostic results  - Monitor all insertion sites, i e  indwelling lines, tubes, and drains  - Monitor endotracheal if appropriate and nasal secretions for changes in amount and color  - [place] appropriate cooling/warming therapies per order  - Administer medications as ordered  - Instruct and encourage patient and family to use good hand hygiene technique  - Identify and instruct in appropriate isolation precautions for identified infection/condition  Outcome: Progressing  Goal: Absence of fever/infection during neutropenic period  Description: INTERVENTIONS:  - Monitor WBC    Outcome: Progressing     Problem: SAFETY ADULT  Goal: Patient will remain free of falls  Description: INTERVENTIONS:  - Assess patient frequently for physical needs  -  Identify cognitive and physical deficits and behaviors that affect risk of falls    -  [place] fall precautions as indicated by assessment   - Educate patient/family on patient safety including physical limitations  - Instruct patient to call for assistance with activity based on assessment  - Modify environment to reduce risk of injury  - Consider OT/PT consult to assist with strengthening/mobility  Outcome: Progressing  Goal: Maintain or return to baseline ADL function  Description: INTERVENTIONS:  -  Assess patient's ability to carry out ADLs; assess patient's baseline for ADL function and identify physical deficits which impact ability to perform ADLs (bathing, care of mouth/teeth, toileting, grooming, dressing, etc )  - Assess/evaluate cause of self-care deficits   - Assess range of motion  - Assess patient's mobility; develop plan if impaired  - Assess patient's need for assistive devices and provide as appropriate  - Encourage maximum independence but intervene and supervise when necessary  - Involve family in performance of ADLs  - Assess for home care needs following discharge   - Consider OT consult to assist with ADL evaluation and planning for discharge  - Provide patient education as appropriate  Outcome: Progressing  Goal: Maintain or return mobility status to optimal level  Description: INTERVENTIONS:  - Assess patient's baseline mobility status (ambulation, transfers, stairs, etc )    - Identify cognitive and physical deficits and behaviors that affect mobility  - Identify mobility aids required to assist with transfers and/or ambulation (gait belt, sit-to-stand, lift, walker, cane, etc )  - [place] fall precautions as indicated by assessment  - Record patient progress and toleration of activity level on Mobility SBAR; progress patient to next Phase/Stage  - Instruct patient to call for assistance with activity based on assessment  - Consider rehabilitation consult to assist with strengthening/weightbearing, etc   Outcome: Progressing     Problem: DISCHARGE PLANNING  Goal: Discharge to home or other facility with appropriate resources  Description: INTERVENTIONS:  - Identify barriers to discharge w/patient and caregiver  - Arrange for needed discharge resources and transportation as appropriate  - Identify discharge learning needs (meds, wound care, etc )  - Arrange for interpretive services to assist at discharge as needed  - Refer to Case Management Department for coordinating discharge planning if the patient needs post-hospital services based on physician/advanced practitioner order or complex needs related to functional status, cognitive ability, or social support system  Outcome: Progressing     Problem: Knowledge Deficit  Goal: Patient/family/caregiver demonstrates understanding of disease process, treatment plan, medications, and discharge instructions  Description: Complete learning assessment and assess knowledge base    Interventions:  - Provide teaching at level of understanding  - Provide teaching via preferred learning methods  Outcome: Progressing     Problem: NEUROSENSORY - ADULT  Goal: Achieves stable or improved neurological status  Description: INTERVENTIONS  - Monitor and report changes in neurological status  - Monitor vital signs such as temperature, blood pressure, glucose, and any other labs ordered   - Initiate measures to prevent increased intracranial pressure  - Monitor for seizure activity and implement precautions if appropriate      Outcome: Progressing     Problem: CARDIOVASCULAR - ADULT  Goal: Maintains optimal cardiac output and hemodynamic stability  Description: INTERVENTIONS:  - Monitor I/O, vital signs and rhythm  - Monitor for S/S and trends of decreased cardiac output  - Administer and titrate ordered vasoactive medications to optimize hemodynamic stability  - Assess quality of pulses, skin color and temperature  - Assess for signs of decreased coronary artery perfusion  - Instruct patient to report change in severity of symptoms  Outcome: Progressing     Problem: Neurological Deficit  Goal: Neurological status is stable or improving  Description: Interventions:  - Monitor and assess patient's level of consciousness, motor function, sensory function, and level of assistance needed for ADLs  - Monitor and report changes from baseline  Collaborate with interdisciplinary team to initiate plan and implement interventions as ordered  - Provide and maintain a safe environment  - Consider seizure precautions  - Consider fall precautions  - Consider aspiration precautions  - Consider bleeding precautions  Outcome: Progressing     Problem: Activity Intolerance/Impaired Mobility  Goal: Mobility/activity is maintained at optimum level for patient  Description: Interventions:  - Assess and monitor patient  barriers to mobility and need for assistive/adaptive devices  - Assess patient's emotional response to limitations  - Collaborate with interdisciplinary team and initiate plans and interventions as ordered  - Encourage independent activity per ability   - Maintain proper body alignment  - Perform active/passive rom as tolerated/ordered  - Plan activities to conserve energy   - Turn patient as appropriate  Outcome: Progressing     Problem: Communication Impairment  Goal: Ability to express needs and understand communication  Description: Assess patient's communication skills and ability to understand information  Patient will demonstrate use of effective communication techniques, alternative methods of communication and understanding even if not able to speak  - Encourage communication and provide alternate methods of communication as needed    - Collaborate with case management/social services for discharge needs  - Include patient/family/caregiver in decisions related to communication  Outcome: Progressing     Problem: Potential for Aspiration  Goal: Non-ventilated patient's risk of aspiration is minimized  Description: Assess and monitor vital signs, respiratory status, and labs (WBC)  Monitor for signs of aspiration (tachypnea, cough, rales, wheezing, cyanosis, fever)  - Assess and monitor patient's ability to swallow  - Place patient up in chair to eat if possible  - HOB up at 90 degrees to eat if unable to get patient up into chair   - Supervise patient during oral intake  - Instruct patient/ family to take small bites  - Instruct patient/ family to take small single sips when taking liquids  - Follow patient-specific strategies generated by speech pathologist   Outcome: Progressing  Goal: Ventilated patient's risk of aspiration is minimized  Description: Assess and monitor vital signs, respiratory status, airway cuff pressure, and labs (WBC)  Monitor for signs of aspiration (tachypnea, cough, rales, wheezing, cyanosis, fever)  - Elevate head of bed 30 degrees if patient has tube feeding   - Monitor tube feeding  Outcome: Progressing     Problem: Nutrition  Goal: Nutrition/Hydration status is improving  Description: Monitor and assess patient's nutrition/hydration status for malnutrition (ex- brittle hair, bruises, dry skin, pale skin and conjunctiva, muscle wasting, smooth red tongue, and disorientation)  Collaborate with interdisciplinary team and initiate plan and interventions as ordered  Monitor patient's weight and dietary intake as ordered or per policy  Utilize nutrition screening tool and intervene per policy  Determine patient's food preferences and provide high-protein, high-caloric foods as appropriate  - Assist patient with eating   - Allow adequate time for meals   - Encourage patient to take dietary supplement as ordered    - Collaborate with clinical nutritionist   - Include patient/family/caregiver in decisions related to nutrition    Outcome: Progressing

## 2021-06-01 NOTE — CONSULTS
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 and reconstitution through left thyrocervical trunk and occipital artery, s/p attempted IR embolization of aneurysm on 5/14/2021, who presented to the ED on 5/31/2021 with worsened left-sided neck pain and left arm numbness/tingling accompanied by left-sided facial numbness, nausea, dizziness, headache and blurred vision in her left eye  She describes the headache and numbness in her arm and face as "pulsating" at onset and she had a feeling of impending doom  BP on arrival was 194/77  She has had decreased sensation of her left face and hypersensitivity of her left ear "for years," and she often notes blurred vision and drooping of the left eyelid, most notably at nighttime  She has noted left-sided neck pain and stiffness since the IR procedure on 5/14/21, which has persisted  Today, she also reports mild numbness on the left side of her face and residual weakness in her left arm, but the headache, dizziness, nausea, left arm numbness and blurred vision have resolved  Plan:  - MRI negative, discontinue stroke pathway  - Telemetry  - Continue ASA and statin  - Neuro checks, notify neurology with changes  - Normotensive blood pressure goal  - Recommend euglycemia  - PT/OT/SLP following    Results Reviewed:  - Labs: A1c 5 9, cholesterol 237, triglycerides 129, HDL 37,   - CTA: No acute intracranial abnormality  Known left PICA aneurysm  On noncontrast imaging, the density has increased from the prior study   This suggests some degree of thrombosis in the interval  On CTA imaging, the enhancing portion of the aneurysm has decreased in size further suggestive of partial thrombosis in the interval  Multiple areas of segmental occlusion of the left vertebral artery in the neck  The vessel occludes intracranially following the origin of PICA  - MRI: No evidence of recent infarct  Patient's known left distal PICA aneurysm is identified and is essentially stable in size from the prior MRI  Minimal surrounding T2 abnormality in the cerebellar parenchyma is stable  Although when comparing prior CTs, this aneurysm appeared more dense on the current suggesting interval thrombosis, this finding cannot be confirmed on MRI  Intracranial aneurysm  Assessment & Plan  Neurosurgery following  Essential hypertension  Assessment & Plan  Normotensive blood pressure goal  Continue Toprol  GERD (gastroesophageal reflux disease)  Assessment & Plan  Continue Pepcid  Satya Linn will not need outpatient follow up with Neurology  She will not require outpatient neurological testing  History of Present Illness     Reason for Consult / Principal Problem: numbness and stroke-like symptoms    HPI: Satya Linn is a 56 y  o female with hypertension, GERD, and a 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 and reconstitution through left thyrocervical trunk and occipital artery, s/p attempted IR embolization of aneurysm on 5/14/2021, who presented to the ED on 5/31/2021 with worsened left-sided neck pain and left arm numbness/tingling accompanied by left-sided facial numbness, nausea, dizziness, headache and blurred vision in her left eye  She describes the headache and numbness in her arm and face as "pulsating" at onset and she had a feeling of impending doom  BP on arrival was 194/77  She has had decreased sensation of her left face and hypersensitivity of her left ear "for years," and she often notes blurred vision and drooping of the left eyelid, most notably at nighttime   She has noted left-sided neck pain and stiffness since the IR procedure on 5/14/21, which has persisted  Today, she also reports mild numbness on the left side of her face and residual weakness in her left arm, but the headache, dizziness, nausea, left arm numbness and blurred vision have resolved  Inpatient consult to Neurology  Consult performed by: Bonifacio Killian  Consult ordered by: Portia Powell MD        Review of Systems   Constitutional: Negative for fever  HENT: Negative for trouble swallowing  Left ear hypersensitivity to high-pitched sounds  Eyes: Negative for visual disturbance  Intermittent blurred vision of left eye with feeling of "heavy eyelid," most notably at nighttime  Respiratory: Negative for shortness of breath  Cardiovascular: Negative for chest pain  Gastrointestinal: Negative for diarrhea, nausea and vomiting  Musculoskeletal: Positive for neck pain (left-sided) and neck stiffness (left-sided)  Skin: Negative for pallor and rash  Neurological: Positive for weakness (left upper extremity, mild) and numbness (left side of face)  Negative for dizziness, speech difficulty and headaches  Historical Information   Past Medical History:   Diagnosis Date    GERD (gastroesophageal reflux disease)     Hypertension     Urinary tract bacterial infections      Past Surgical History:   Procedure Laterality Date    ABDOMINAL SURGERY      COLONOSCOPY  2021    DILATION AND CURETTAGE, DIAGNOSTIC / THERAPEUTIC      HYSTERECTOMY      HYSTERECTOMY  2020    IR CEREBRAL ANGIOGRAPHY  2/5/2021    IR CEREBRAL ANGIOGRAPHY / INTERVENTION  5/14/2021    TONSILLECTOMY      TUBAL LIGATION       Social History   Social History     Substance and Sexual Activity   Alcohol Use Yes    Frequency: Monthly or less    Comment: social     Social History     Substance and Sexual Activity   Drug Use Yes    Types: Marijuana    Comment: Marijuana at 14and 23years old        E-Cigarette/Vaping    E-Cigarette Use Never User      E-Cigarette/Vaping Substances    Nicotine No     THC No     CBD No     Flavoring No     Other No     Unknown No      Social History     Tobacco Use   Smoking Status Former Smoker    Types: Cigarettes    Quit date: 4/15/2007    Years since quittin 1   Smokeless Tobacco Never Used     Family History:   Family History   Problem Relation Age of Onset    Hypertension Mother     Hypertension Father        Review of previous medical records was completed  Meds/Allergies   current meds:   Current Facility-Administered Medications   Medication Dose Route Frequency    acetaminophen (TYLENOL) tablet 650 mg  650 mg Oral Q4H PRN    aspirin chewable tablet 81 mg  81 mg Oral Daily    atorvastatin (LIPITOR) tablet 40 mg  40 mg Oral QPM    enoxaparin (LOVENOX) subcutaneous injection 40 mg  40 mg Subcutaneous Daily    famotidine (PEPCID) tablet 40 mg  40 mg Oral Daily    metoprolol succinate (TOPROL-XL) 24 hr tablet 25 mg  25 mg Oral Daily       No Known Allergies    Objective   Vitals:Blood pressure (!) 132/108, pulse 74, temperature 97 8 °F (36 6 °C), temperature source Oral, resp  rate 18, height 5' 3" (1 6 m), weight 80 3 kg (177 lb), SpO2 96 %  ,Body mass index is 31 35 kg/m²  Intake/Output Summary (Last 24 hours) at 2021 1625  Last data filed at 2021 1230  Gross per 24 hour   Intake 460 ml   Output --   Net 460 ml       Invasive Devices: Invasive Devices     None                 Physical Exam  Vitals signs reviewed  HENT:      Head: Normocephalic and atraumatic  Eyes:      Extraocular Movements: Extraocular movements intact and EOM normal       Pupils: Pupils are equal, round, and reactive to light  Neck:      Musculoskeletal: Normal range of motion  No neck rigidity  Cardiovascular:      Rate and Rhythm: Normal rate  Pulmonary:      Effort: Pulmonary effort is normal    Musculoskeletal: Normal range of motion  Skin:     General: Skin is warm and dry  Neurological:      General: No focal deficit present        Mental Status: She is alert and oriented to person, place, and time  Deep Tendon Reflexes: Strength normal       Reflex Scores:       Bicep reflexes are 1+ on the right side and 1+ on the left side  Brachioradialis reflexes are 1+ on the right side and 1+ on the left side  Psychiatric:         Speech: Speech normal        Neurologic Exam     Mental Status   Oriented to person, place, and time  Speech: speech is normal   Level of consciousness: alert  Patient is able to follow commands  Cranial Nerves     CN II   Visual fields full to confrontation  CN III, IV, VI   Pupils are equal, round, and reactive to light  Extraocular motions are normal    Nystagmus: none   Diplopia: none    CN V   Facial sensation intact  CN VII   Facial expression full, symmetric  CN XI   CN XI normal      Motor Exam   Muscle bulk: normal  Overall muscle tone: normal    Strength   Strength 5/5 throughout  Sensory Exam   Light touch normal      Gait, Coordination, and Reflexes     Tremor   Resting tremor: absent  Action tremor: absent    Reflexes   Reflexes 2+ except as noted  Right brachioradialis: 1+  Left brachioradialis: 1+  Right biceps: 1+  Left biceps: 1+Patient able to perform finger to nose bilaterally and heel to shin bilaterally         Lab Results:   CBC:   Results from last 7 days   Lab Units 06/01/21  0456 05/31/21  1012   WBC Thousand/uL 4 52 5 53   RBC Million/uL 4 62 5 08   HEMOGLOBIN g/dL 14 2 15 4   HEMATOCRIT % 43 1 46 8*   MCV fL 93 92   PLATELETS Thousands/uL 269 297   , BMP/CMP:   Results from last 7 days   Lab Units 06/01/21  0456 05/31/21  1012   SODIUM mmol/L 140 141   POTASSIUM mmol/L 3 7 4 0   CHLORIDE mmol/L 108 111*   CO2 mmol/L 29 24   BUN mg/dL 14 16   CREATININE mg/dL 0 69 0 75   CALCIUM mg/dL 8 8 9 8   EGFR ml/min/1 73sq m 94 86   , HgBA1C:   Results from last 7 days   Lab Units 05/31/21  1012   HEMOGLOBIN A1C % 5 9*   , Coagulation:   Results from last 7 days   Lab Units 05/31/21  1012   INR  1 01   , Lipid Profile:   Results from last 7 days   Lab Units 06/01/21  0456   HDL mg/dL 37*   LDL CALC mg/dL 174*   TRIGLYCERIDES mg/dL 129     Imaging Studies: I have personally reviewed pertinent films in PACS  VTE Prophylaxis: Enoxaparin (Lovenox)    Code Status: Level 1 - Full Code

## 2021-06-01 NOTE — ASSESSMENT & PLAN NOTE
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 and reconstitution through left thyrocervical trunk and occipital artery, s/p attempted IR embolization of aneurysm on 5/14/2021, who presented to the ED on 5/31/2021 with worsened left-sided neck pain and left arm numbness/tingling accompanied by left-sided facial numbness, nausea, dizziness, headache and blurred vision in her left eye  She describes the headache and numbness in her arm and face as "pulsating" at onset and she had a feeling of impending doom  BP on arrival was 194/77  She has had decreased sensation of her left face and hypersensitivity of her left ear "for years," and she often notes blurred vision and drooping of the left eyelid, most notably at nighttime  She has noted left-sided neck pain and stiffness since the IR procedure on 5/14/21, which has persisted  Today, she also reports mild numbness on the left side of her face and residual weakness in her left arm, but the headache, dizziness, nausea, left arm numbness and blurred vision have resolved  Plan:  - MRI negative, discontinue stroke pathway  - Telemetry  - Continue ASA and statin  - Neuro checks, notify neurology with changes  - Normotensive blood pressure goal  - Recommend euglycemia  - PT/OT/SLP following    Results Reviewed:  - Labs: A1c 5 9, cholesterol 237, triglycerides 129, HDL 37,   - CTA: No acute intracranial abnormality  Known left PICA aneurysm  On noncontrast imaging, the density has increased from the prior study  This suggests some degree of thrombosis in the interval  On CTA imaging, the enhancing portion of the aneurysm has decreased in size further suggestive of partial thrombosis in the interval  Multiple areas of segmental occlusion of the left vertebral artery in the neck  The vessel occludes intracranially following the origin of PICA  - MRI: No evidence of recent infarct  Patient's known left distal PICA aneurysm is identified and is essentially stable in size from the prior MRI  Minimal surrounding T2 abnormality in the cerebellar parenchyma is stable  Although when comparing prior CTs, this aneurysm appeared more dense on the current suggesting interval thrombosis, this finding cannot be confirmed on MRI

## 2021-06-01 NOTE — ASSESSMENT & PLAN NOTE
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 reconstitution through the left thigh of cervical trunk and occipital artery  · Status post attempted embolization of aneurysm on May 14, 2021  Unable to catheterize aneurysm secondary to tortuous vessels  Imaging:   · CTA head and neck with without 5/31/2021:  Known left bike aneurysm located immediately posterior to lateral 4th ventricle  Some density increased from prior imaging suggesting degree of thrombus  He enhancing portion of aneurysm has decreased and thigh therefore likely partially thrombosed  Multiple areas of segmental occlusion of the left vertebral artery noted in neck  Muscle fluid intracranially following origin of PICA  No new findings  Plan:   · Continue monitor neurological exam    · CTA results reviewed with patient and  at bedside  · Patient was provided with treatment option for known aneurysm  At this time, she anticipates just close follow-up in his deferring any intervention  · For outpatient note, will plan for CTA in one year

## 2021-06-01 NOTE — PHYSICAL THERAPY NOTE
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]    Problem List  Patient Active Problem List   Diagnosis    GERD (gastroesophageal reflux disease)    Essential hypertension    Stroke-like symptoms    Intracranial aneurysm       Past Medical History  Past Medical History:   Diagnosis Date    GERD (gastroesophageal reflux disease)     Hypertension     Urinary tract bacterial infections        Past Surgical History  Past Surgical History:   Procedure Laterality Date    ABDOMINAL SURGERY      COLONOSCOPY  2021    DILATION AND CURETTAGE, DIAGNOSTIC / THERAPEUTIC      HYSTERECTOMY      HYSTERECTOMY  2020    IR CEREBRAL ANGIOGRAPHY  2/5/2021    IR CEREBRAL ANGIOGRAPHY / INTERVENTION  5/14/2021    TONSILLECTOMY      TUBAL LIGATION          06/01/21 0830   PT Last Visit   PT Visit Date 06/01/21   Note Type   Note type Evaluation   Pain Assessment   Pain Assessment Tool FLACC   Pain Location/Orientation Location: Head  (headache)   Hospital Pain Intervention(s) Repositioned; Ambulation/increased activity   Pain Rating: FLACC (Rest) - Face 0   Pain Rating: FLACC (Rest) - Legs 0   Pain Rating: FLACC (Rest) - Activity 0   Pain Rating: FLACC (Rest) - Cry 0   Pain Rating: FLACC (Rest) - Consolability 0   Score: FLACC (Rest) 0   Pain Rating: FLACC (Activity) - Face 1   Pain Rating: FLACC (Activity) - Legs 0   Pain Rating: FLACC (Activity) - Activity 0   Pain Rating: FLACC (Activity) - Cry 0   Pain Rating: FLACC (Activity) - Consolability 0   Score: FLACC (Activity) 1   Home Living   Type of Home House   Home Layout Two level;Stairs to enter with rails;Bed/bath upstairs   Home Equipment Walker   Additional Comments Pt reports living in a [facility] with 2 NITHYA   Has FFSU if needed, but main bed/bath is on 2nd floor  Was ambulating I w/o AD PTA   Prior Function   Level of Saline Independent with ADLs and functional mobility   Lives With Spouse   Receives Help From Family   ADL Assistance Independent   IADLs Independent   Falls in the last 6 months 0   Vocational Full time employment   Restrictions/Precautions   Weight Bearing Precautions Per Order No   Other Precautions Multiple lines;Telemetry; Fall Risk   Cognition   Overall Cognitive Status WFL   Orientation Level Oriented X4   Memory Within functional limits   Following Commands Follows one step commands without difficulty   RLE Assessment   RLE Assessment WFL   LLE Assessment   LLE Assessment WFL   Light Touch   RLE Light Touch Grossly intact   LLE Light Touch Grossly intact   Bed Mobility   Supine to Sit 6  Modified independent   Additional items HOB elevated; Increased time required   Additional Comments Pt ended session seated in recliner with all needs in reach  Transfers   Sit to Stand 5  Supervision   Additional items Increased time required   Stand to Sit 5  Supervision   Additional items Increased time required   Additional Comments Transfers w/o AD  Pt denying any dizziness/lightheadedness with standing   Ambulation/Elevation   Gait pattern Short stride   Gait Assistance 5  Supervision   Additional items Assist x 1   Assistive Device None   Distance 360ft- steady with no episodes of LOB   Balance   Static Sitting Good   Dynamic Sitting Fair +   Static Standing Fair   Dynamic Standing Fair -   Ambulatory Fair -   Activity Tolerance   Activity Tolerance Patient tolerated treatment well   Medical Staff Made Aware Co-eval with OT 2* pt's medical complexity and multiple comorbidities   Nurse Made Aware ok to see per RN   Assessment   Prognosis Good   Assessment Pt is a 56 y o  female seen for PT evaluation s/p admit to 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"with unsuccessful embolization attempt on 5/14  PT now consulted for assessment of mobility and d/c needs  Pt with active PT evaluation orders  Pts current comorbidities effecting treatment include: GERD, HTN, UTI, intra-cranial aneurysm  Pts current clinical presentation is Evolving (medium complexity) due to Ongoing medical management for primary dx, Decreased activity tolerance compared to baseline, Increased assistance needed from caregiver at current time, Ongoing telemetry monitoring, Diagnostic imaging pending  Prior to admission, pt was I with ADLs and ambulating w/o AD  Pt reports living with her  in a [facility] with 2STE  Upon evaluation, pt currently is mod I for bed mobility; supervision for transfers and supervision for ambulation 360 ft w/ no AD  Pt presents at PT eval functioning at/near baseline mobility level, limited mostly by visual deficits (see OT evaluation/recommendation)  No acute PT needs at this time, PT signing off  Pt would benefit from continued ambulation with staff while in hospital  At conclusion of PT session pt returned back in chair with phone and call bell within reach  Pt denies any further questions at this time  The patient's AM-PAC Basic Mobility Inpatient Short Form Raw Score is 21, Standardized Score is 45 55  A standardized score greater than 42 9 suggests the patient may benefit from discharge to home  Please also refer to the recommendation of the Physical Therapist for safe discharge planning  Recommend home with social support upon hospital D/C     Barriers to Discharge None   Goals   Patient Goals to go home   STG Expiration Date 06/11/21   Plan   PT Frequency One time visit  (D/C PT)   Recommendation   PT Discharge Recommendation No rehabilitation needs   PT - OK to Discharge Yes  (when medically cleared)   AM-PAC Basic Mobility Inpatient   Turning in Bed Without Bedrails 4   Lying on Back to Sitting on Edge of Flat Bed 4   Moving Bed to Chair 4   Standing Up From Chair 3   Walk in Room 3   Climb 3-5 Stairs 3   Basic Mobility Inpatient Raw Score 21   Basic Mobility Standardized Score 45 55   Modified [place] Scale   Modified [place] Scale 3   Barthel Index   Feeding 10   Bathing 5   Grooming Score 5   Dressing Score 10   Bladder Score 10   Bowels Score 10   Toilet Use Score 10   Transfers (Bed/Chair) Score 10   Mobility (Level Surface) Score 10   Stairs Score 5   Barthel Index Score 85       Felice Salter, PT, DPT

## 2021-06-01 NOTE — UTILIZATION REVIEW
Initial Clinical Review    Admission: Date/Time/Statement:   Admission Orders (From admission, onward)     Ordered        05/31/21 1321  Place in Observation  Once                   Orders Placed This Encounter   Procedures    Place in Observation     Standing Status:   Standing     Number of Occurrences:   1     Order Specific Question:   Level of Care     Answer:   Med Surg [16]     ED Arrival Information     Expected Arrival Acuity Means of Arrival Escorted By Service Admission Type    - 5/31/2021 09:45 Emergent Walk-In Family Member Hospitalist Emergency    Arrival Complaint    numbness left side,sob        Chief Complaint   Patient presents with    Numbness     Pt reports numbness in her arm and a headache, nausea and pain in the back of her neck  Initial Presentation:         56year old female presents to ed from home for evaluation and treatment of headache , left arm weakness and numbness and intermittent blurry vision  PMHX: known L PICA aneurysm  Clinical assessment significant for possible occlusion in L vertebral artery aneurysm  MRI obtain which report no evidence of infarct  Plan neurology consult, neurosurgery, Echo           ED Triage Vitals [05/31/21 0953]   Temperature Pulse Respirations Blood Pressure SpO2   97 8 °F (36 6 °C) 77 16 (!) 194/77 99 %      Temp Source Heart Rate Source Patient Position - Orthostatic VS BP Location FiO2 (%)   Oral Monitor Lying Left arm --      Pain Score       6          Wt Readings from Last 1 Encounters:   05/31/21 80 3 kg (177 lb)     Additional Vital Signs:       Vitals:    06/01/21 0400 06/01/21 0600 06/01/21 0753 06/01/21 0925   BP: 124/67 109/60 112/63 (!) 132/108   BP Location:   Left arm    Pulse: 79 60 67 74   Resp:   18    Temp:   97 8 °F (36 6 °C)    TempSrc:   Oral    SpO2: 96% 94% 95% 96%   Weight:       Height:         Pertinent Labs/Diagnostic Test Results:         MRI brain wo contrast   Final  (06/01 5133)      No evidence of recent infarct  Patient's known left distal PICA aneurysm is again identified and is essentially stable in size from the prior MRI  Minimal surrounding T2 abnormality in the cerebellar parenchyma is stable  Although when comparing prior CTs, this aneurysm appeared    more dense on the current suggesting interval thrombosis, this finding cannot be confirmed on MRI  CTA head and neck with and without contrast   Final  (05/31 1241)      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  On noncontrast imaging, the density has increased from the prior study  This suggests some degree of thrombosis in the interval   On CTA imaging, the enhancing portion of the aneurysm has decreased in size further suggestive of partial thrombosis in the interval          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  The vessel occludes intracranially following the origin of PICA  No acute intracranial abnormality              Results from last 7 days   Lab Units 06/01/21  0456 05/31/21  1012   WBC Thousand/uL 4 52 5 53   HEMOGLOBIN g/dL 14 2 15 4   HEMATOCRIT % 43 1 46 8*   PLATELETS Thousands/uL 269 297         Results from last 7 days   Lab Units 06/01/21  0456 05/31/21  1012   SODIUM mmol/L 140 141   POTASSIUM mmol/L 3 7 4 0   CHLORIDE mmol/L 108 111*   CO2 mmol/L 29 24   ANION GAP mmol/L 3* 6   BUN mg/dL 14 16   CREATININE mg/dL 0 69 0 75   EGFR ml/min/1 73sq m 94 86   CALCIUM mg/dL 8 8 9 8   MAGNESIUM mg/dL 2 2  --    PHOSPHORUS mg/dL 3 5  --              Results from last 7 days   Lab Units 06/01/21  0456 05/31/21  1012   GLUCOSE RANDOM mg/dL 106 104         Results from last 7 days   Lab Units 05/31/21  1012   HEMOGLOBIN A1C % 5 9*   EAG mg/dl 123       Results from last 7 days   Lab Units 05/31/21  1012   TROPONIN I ng/mL <0 02         Results from last 7 days   Lab Units 05/31/21  1012   PROTIME seconds 13 3   INR 1 01   PTT seconds 32         ED Treatment:   Medication Administration from 05/31/2021 0944 to 05/31/2021 2033       Date/Time Order Dose Route Action     05/31/2021 1709 acetaminophen (TYLENOL) tablet 650 mg 650 mg Oral Given        Past Medical History:   Diagnosis    GERD (gastroesophageal reflux disease)    Hypertension    Urinary tract bacterial infections     Present on Admission:   Stroke-like symptoms   GERD (gastroesophageal reflux disease)   Essential hypertension   Intracranial aneurysm      Admitting Diagnosis:   Numbness [R20 0]  Stroke-like symptoms [R29 90]  Neck pain on left side [M54 2]  Tingling of left upper extremity [R20 2]  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]    Age/Sex: 56 y o  female    Scheduled Medications:  aspirin, 81 mg, Oral, Daily  atorvastatin, 40 mg, Oral, QPM  enoxaparin, 40 mg, Subcutaneous, Daily  famotidine, 40 mg, Oral, Daily  metoprolol succinate, 25 mg, Oral, Daily      Continuous IV Infusions:     PRN Meds:  acetaminophen, 650 mg, Oral, Q4H PRN        IP CONSULT TO NEUROLOGY  IP CONSULT TO CASE MANAGEMENT  IP CONSULT TO NUTRITION SERVICES  IP CONSULT TO NEUROSURGERY    Network Utilization Review Department  ATTENTION: Please call with any questions or concerns to [phone] and carefully listen to the prompts so that you are directed to the right person  All voicemails are confidential   Philip Britt all requests for admission clinical reviews, approved or denied determinations and any other requests to dedicated fax number below belonging to the campus where the patient is receiving treatment   List of dedicated fax numbers for the Facilities:  [address] DENIALS (Administrative/Medical Necessity) [phone]   [address] (Maternity/NICU/Pediatrics) 261 [facility] [address]  [address] [place] [address] [address] [place] [address] Shun Morelos [address] [place], [place] [address] [phone]

## 2021-06-01 NOTE — PROGRESS NOTES
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 and reconstitution through left thyroid cervical trunk and occipital artery, status post IR embolization/coiling attempt on 05/14, unsuccessful  · CTA head/neck revealed, per the results report: "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  On noncontrast imaging, the density has increased from the prior study  This suggests some degree of thrombosis in the interval   On CTA imaging, the enhancing portion of the aneurysm has decreased in size further suggestive of partial thrombosis in the interval  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  The vessel occludes intracranially following the origin of PICA  No acute intracranial abnormality "  · MRI brain revealed, per the results report: "No evidence of recent infarct  Patient's known left distal PICA aneurysm is again identified and is essentially stable in size from the prior MRI  Minimal surrounding T2 abnormality in the cerebellar parenchyma is stable    Although when comparing prior CTs, this aneurysm appeared more dense on the current suggesting interval thrombosis, this finding cannot be confirmed on MRI "  · Neurology consult pending  · Neurosurgery consult pending  · Echo pending    Intracranial aneurysm  Assessment & Plan  · Plan as per above  · Neurosurgery consult pending     Essential hypertension  Assessment & Plan  · Continue Toprol  · BP goal per Neurology/Neurosurgery    GERD (gastroesophageal reflux disease)  Assessment & Plan  · Continue Pepcid      VTE Pharmacologic Prophylaxis:   Pharmacologic: Enoxaparin (Lovenox)  Mechanical VTE Prophylaxis in Place: Yes    Patient Centered Rounds: I have performed bedside rounds with nursing staff today  Discussions with Specialists or Other Care Team Provider:     Education and Discussions with Family / Patient: patient; when asked if there was anyone she would like me to call today with an update the patient declined and stated her  would be coming in and they wanted to talk to Neurosurgery     Time Spent for Care: 30 minutes  More than 50% of total time spent on counseling and coordination of care as described above  Current Length of Stay: 0 day(s)    Current Patient Status: Observation   Certification Statement: The patient will continue to require additional inpatient hospital stay due to pending Neurology and Neurosurgery recs    Discharge Plan: pending Neurology and Neurosurgery     Code Status: Level 1 - Full Code      Subjective:   Ms Linn reports that her presenting symptoms have now since resolved    Objective:     Vitals:   Temp (24hrs), Av 3 °F (36 8 °C), Min:97 8 °F (36 6 °C), Max:98 7 °F (37 1 °C)    Temp:  [97 8 °F (36 6 °C)-98 7 °F (37 1 °C)] 97 8 °F (36 6 °C)  HR:  [60-79] 74  Resp:  [16-24] 18  BP: (109-152)/() 132/108  SpO2:  [92 %-97 %] 96 %  Body mass index is 31 35 kg/m²  Input and Output Summary (last 24 hours):     No intake or output data in the 24 hours ending 21 1252    Physical Exam:     Physical Exam  Vitals signs and nursing note reviewed  Constitutional:       Comments: Patient seen lying in bed comfortably resting  Pleasant and cooperative   Cardiovascular:      Rate and Rhythm: Normal rate and regular rhythm  Pulmonary:      Effort: Pulmonary effort is normal  No respiratory distress  Breath sounds: Normal breath sounds  Abdominal:      General: Bowel sounds are normal       Palpations: Abdomen is soft  Tenderness: There is no abdominal tenderness  Musculoskeletal:      Right lower leg: No edema  Left lower leg: No edema  Skin:     General: Skin is warm  Neurological:      Mental Status: She is alert and oriented to person, place, and time  Psychiatric:         Mood and Affect: Mood normal          Behavior: Behavior normal        Additional Data:     Labs:    Results from last 7 days   Lab Units 06/01/21  0456   WBC Thousand/uL 4 52   HEMOGLOBIN g/dL 14 2   HEMATOCRIT % 43 1   PLATELETS Thousands/uL 269     Results from last 7 days   Lab Units 06/01/21  0456   SODIUM mmol/L 140   POTASSIUM mmol/L 3 7   CHLORIDE mmol/L 108   CO2 mmol/L 29   BUN mg/dL 14   CREATININE mg/dL 0 69   ANION GAP mmol/L 3*   CALCIUM mg/dL 8 8   GLUCOSE RANDOM mg/dL 106     Results from last 7 days   Lab Units 05/31/21  1012   INR  1 01         Results from last 7 days   Lab Units 05/31/21  1012   HEMOGLOBIN A1C % 5 9*               * I Have Reviewed All Lab Data Listed Above  * Additional Pertinent Lab Tests Reviewed: All Labs Within Last 24 Hours Reviewed    Imaging:    Imaging Reports Reviewed Today Include: CT and MRI as above  Imaging Personally Reviewed by Myself Includes:  none    Recent Cultures (last 7 days):           Last 24 Hours Medication List:   Current Facility-Administered Medications   Medication Dose Route Frequency Provider Last Rate    acetaminophen  650 mg Oral Q4H PRN Justen Paul MD      aspirin  81 mg Oral Daily Justen Paul MD      atorvastatin  40 mg Oral QPM Justen Paul MD      enoxaparin  40 mg Subcutaneous Daily Justen Paul MD      famotidine  40 mg Oral Daily Justen Paul MD      metoprolol succinate  25 mg Oral Daily Justen Paul MD          Today, Patient Was Seen By: Kin Naylor PA-C    ** Please Note: Dictation voice to text software may have been used in the creation of this document   **

## 2021-06-01 NOTE — PLAN OF CARE
Problem: Potential for Falls  Goal: Patient will remain free of falls  Description: INTERVENTIONS:  - Assess patient frequently for physical needs  -  Identify cognitive and physical deficits and behaviors that affect risk of falls    -  [place] fall precautions as indicated by assessment   - Educate patient/family on patient safety including physical limitations  - Instruct patient to call for assistance with activity based on assessment  - Modify environment to reduce risk of injury  - Consider OT/PT consult to assist with strengthening/mobility  Outcome: Progressing     Problem: PAIN - ADULT  Goal: Verbalizes/displays adequate comfort level or baseline comfort level  Description: Interventions:  - Encourage patient to monitor pain and request assistance  - Assess pain using appropriate pain scale  - Administer analgesics based on type and severity of pain and evaluate response  - Implement non-pharmacological measures as appropriate and evaluate response  - Consider cultural and social influences on pain and pain management  - Notify physician/advanced practitioner if interventions unsuccessful or patient reports new pain  Outcome: Progressing     Problem: INFECTION - ADULT  Goal: Absence or prevention of progression during hospitalization  Description: INTERVENTIONS:  - Assess and monitor for signs and symptoms of infection  - Monitor lab/diagnostic results  - Monitor all insertion sites, i e  indwelling lines, tubes, and drains  - Monitor endotracheal if appropriate and nasal secretions for changes in amount and color  - [place] appropriate cooling/warming therapies per order  - Administer medications as ordered  - Instruct and encourage patient and family to use good hand hygiene technique  - Identify and instruct in appropriate isolation precautions for identified infection/condition  Outcome: Progressing  Goal: Absence of fever/infection during neutropenic period  Description: INTERVENTIONS:  - Monitor WBC    Outcome: Progressing     Problem: SAFETY ADULT  Goal: Patient will remain free of falls  Description: INTERVENTIONS:  - Assess patient frequently for physical needs  -  Identify cognitive and physical deficits and behaviors that affect risk of falls    -  [place] fall precautions as indicated by assessment   - Educate patient/family on patient safety including physical limitations  - Instruct patient to call for assistance with activity based on assessment  - Modify environment to reduce risk of injury  - Consider OT/PT consult to assist with strengthening/mobility  Outcome: Progressing  Goal: Maintain or return to baseline ADL function  Description: INTERVENTIONS:  -  Assess patient's ability to carry out ADLs; assess patient's baseline for ADL function and identify physical deficits which impact ability to perform ADLs (bathing, care of mouth/teeth, toileting, grooming, dressing, etc )  - Assess/evaluate cause of self-care deficits   - Assess range of motion  - Assess patient's mobility; develop plan if impaired  - Assess patient's need for assistive devices and provide as appropriate  - Encourage maximum independence but intervene and supervise when necessary  - Involve family in performance of ADLs  - Assess for home care needs following discharge   - Consider OT consult to assist with ADL evaluation and planning for discharge  - Provide patient education as appropriate  Outcome: Progressing  Goal: Maintain or return mobility status to optimal level  Description: INTERVENTIONS:  - Assess patient's baseline mobility status (ambulation, transfers, stairs, etc )    - Identify cognitive and physical deficits and behaviors that affect mobility  - Identify mobility aids required to assist with transfers and/or ambulation (gait belt, sit-to-stand, lift, walker, cane, etc )  - [place] fall precautions as indicated by assessment  - Record patient progress and toleration of activity level on Mobility SBAR; progress patient to next Phase/Stage  - Instruct patient to call for assistance with activity based on assessment  - Consider rehabilitation consult to assist with strengthening/weightbearing, etc   Outcome: Progressing     Problem: DISCHARGE PLANNING  Goal: Discharge to home or other facility with appropriate resources  Description: INTERVENTIONS:  - Identify barriers to discharge w/patient and caregiver  - Arrange for needed discharge resources and transportation as appropriate  - Identify discharge learning needs (meds, wound care, etc )  - Arrange for interpretive services to assist at discharge as needed  - Refer to Case Management Department for coordinating discharge planning if the patient needs post-hospital services based on physician/advanced practitioner order or complex needs related to functional status, cognitive ability, or social support system  Outcome: Progressing     Problem: Knowledge Deficit  Goal: Patient/family/caregiver demonstrates understanding of disease process, treatment plan, medications, and discharge instructions  Description: Complete learning assessment and assess knowledge base    Interventions:  - Provide teaching at level of understanding  - Provide teaching via preferred learning methods  Outcome: Progressing     Problem: NEUROSENSORY - ADULT  Goal: Achieves stable or improved neurological status  Description: INTERVENTIONS  - Monitor and report changes in neurological status  - Monitor vital signs such as temperature, blood pressure, glucose, and any other labs ordered   - Initiate measures to prevent increased intracranial pressure  - Monitor for seizure activity and implement precautions if appropriate      Outcome: Progressing     Problem: CARDIOVASCULAR - ADULT  Goal: Maintains optimal cardiac output and hemodynamic stability  Description: INTERVENTIONS:  - Monitor I/O, vital signs and rhythm  - Monitor for S/S and trends of decreased cardiac output  - Administer and titrate ordered vasoactive medications to optimize hemodynamic stability  - Assess quality of pulses, skin color and temperature  - Assess for signs of decreased coronary artery perfusion  - Instruct patient to report change in severity of symptoms  Outcome: Progressing     Problem: Neurological Deficit  Goal: Neurological status is stable or improving  Description: Interventions:  - Monitor and assess patient's level of consciousness, motor function, sensory function, and level of assistance needed for ADLs  - Monitor and report changes from baseline  Collaborate with interdisciplinary team to initiate plan and implement interventions as ordered  - Provide and maintain a safe environment  - Consider seizure precautions  - Consider fall precautions  - Consider aspiration precautions  - Consider bleeding precautions  Outcome: Progressing     Problem: Activity Intolerance/Impaired Mobility  Goal: Mobility/activity is maintained at optimum level for patient  Description: Interventions:  - Assess and monitor patient  barriers to mobility and need for assistive/adaptive devices  - Assess patient's emotional response to limitations  - Collaborate with interdisciplinary team and initiate plans and interventions as ordered  - Encourage independent activity per ability   - Maintain proper body alignment  - Perform active/passive rom as tolerated/ordered  - Plan activities to conserve energy   - Turn patient as appropriate  Outcome: Progressing     Problem: Communication Impairment  Goal: Ability to express needs and understand communication  Description: Assess patient's communication skills and ability to understand information  Patient will demonstrate use of effective communication techniques, alternative methods of communication and understanding even if not able to speak  - Encourage communication and provide alternate methods of communication as needed    - Collaborate with case management/social services for discharge needs  - Include patient/family/caregiver in decisions related to communication  Outcome: Progressing     Problem: Potential for Aspiration  Goal: Non-ventilated patient's risk of aspiration is minimized  Description: Assess and monitor vital signs, respiratory status, and labs (WBC)  Monitor for signs of aspiration (tachypnea, cough, rales, wheezing, cyanosis, fever)  - Assess and monitor patient's ability to swallow  - Place patient up in chair to eat if possible  - HOB up at 90 degrees to eat if unable to get patient up into chair   - Supervise patient during oral intake  - Instruct patient/ family to take small bites  - Instruct patient/ family to take small single sips when taking liquids  - Follow patient-specific strategies generated by speech pathologist   Outcome: Progressing  Goal: Ventilated patient's risk of aspiration is minimized  Description: Assess and monitor vital signs, respiratory status, airway cuff pressure, and labs (WBC)  Monitor for signs of aspiration (tachypnea, cough, rales, wheezing, cyanosis, fever)  - Elevate head of bed 30 degrees if patient has tube feeding   - Monitor tube feeding  Outcome: Progressing     Problem: Nutrition  Goal: Nutrition/Hydration status is improving  Description: Monitor and assess patient's nutrition/hydration status for malnutrition (ex- brittle hair, bruises, dry skin, pale skin and conjunctiva, muscle wasting, smooth red tongue, and disorientation)  Collaborate with interdisciplinary team and initiate plan and interventions as ordered  Monitor patient's weight and dietary intake as ordered or per policy  Utilize nutrition screening tool and intervene per policy  Determine patient's food preferences and provide high-protein, high-caloric foods as appropriate  - Assist patient with eating   - Allow adequate time for meals   - Encourage patient to take dietary supplement as ordered    - Collaborate with clinical nutritionist   - Include patient/family/caregiver in decisions related to nutrition    Outcome: Progressing

## 2021-06-01 NOTE — DISCHARGE SUMMARY
Discharge Summary - [facility] Internal Medicine    Patient Information: Shawnee Linn 56 y o  female MRN: [id number]  Unit/Bed#: [facility] 709-01 Encounter: [id number]    Discharging Physician / Practitioner: Grace Mendoza PA-C  PCP: Sheryle Bail, DO  Admission Date: 5/31/2021  Discharge Date: 06/01/21    Reason for Admission: stroke like symptoms     Discharge Diagnoses:     Principal Problem:    Stroke-like symptoms  Active Problems:    Intracranial aneurysm    Essential hypertension    GERD (gastroesophageal reflux disease)  Resolved Problems:    * No resolved hospital problems  *      Consultations During Hospital Stay:  · Neurology  · Neurosurgery  · PT  · OT    Procedures Performed:   · CTA head and neck  · MRI brain  · BMP  · Magnesium  · Phosphorus  · Troponin   · Lipid panel  · CBC  · Hemoglobin A1c    Significant Findings:   · CTA head and neck:  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  On noncontrast imaging, the density has increased from the prior study  This suggests some degree of thrombosis in the interval   On CTA imaging, the enhancing portion of the aneurysm has decreased in size further suggestive of partial thrombosis in the interval   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  The vessel occludes intracranially following the origin of the PICA  No acute intracranial abnormality  · MRI brain:  No evidence of recent infarct  Patient's known left distal PICA aneurysm is again identified and is essentially stable in size from the prior MRI  Minimal surrounding T2 abnormality in the cerebellar parenchyma is stable  Although when comparing to prior CTs, this aneurysm appeared more dense on the current suggesting interval thrombosis, this finding cannot be confirmed on MRI    · Lipid panel:  Cholesterol 237, triglycerides 129, HDL 37,   · Hemoglobin A1c:  5 9    Incidental Findings:   · None     Test Results Pending at Discharge (will require follow up): · None     Outpatient Tests Requested:  · Follow up with Neurosurgeon   · Follow up with PCP    Complications:  None    Hospital Course:     Daniel Linn is a 56 y o  female with known PICA aneurysm, HTN, GERD, and HLD not taking her statin who originally presented to the hospital on 5/31/2021 due to worsened headache, left upper extremity weakness/numbness as well as intermittent blurry vision and headache  CTA revealed the above findings  ED physician discussed with the patient's neurosurgeon, Dr Sharda Guillory, who recommended MRI  Neurosurgery and Neurology were consulted  MRI brain revealed the above findings  I discussed with both Neurosurgery, and Neurology on day of discharge, both of whom cleared the patient from their respective standpoint for discharge today  PT recommended no rehabilitation needs  Occupational therapy recommended outpatient follow-up to address vision deficits and fit to drive  I discussed with the patient, her , her nurse, Neurosurgery, Neurology, OT, Case Management on day of discharge    Condition at Discharge: stable     Discharge Day Visit / Exam:     * Please refer to separate progress for these details *    Discharge instructions/Information to patient and family:   See after visit summary for information provided to patient and family  Provisions for Follow-Up Care:  See after visit summary for information related to follow-up care and any pertinent home health orders  Disposition:     Home    For Discharges to [address] [facility]:   · Not Applicable to this Patient - Not Applicable to this Patient    Planned Readmission: None    Discharge Statement:  I spent 34 minutes discharging the patient  This time was spent on the day of discharge  I had direct contact with the patient on the day of discharge   Greater than 50% of the total time was spent examining patient, answering all patient questions, arranging and discussing plan of care with patient as well as directly providing post-discharge instructions  Additional time then spent on discharge activities  Discharge Medications:  See after visit summary for reconciled discharge medications provided to patient and family  ** Please Note: Dragon 360 Dictation voice to text software may have been used in the creation of this document   **

## 2021-06-01 NOTE — OCCUPATIONAL THERAPY NOTE
Occupational Therapy Evaluation     Patient Name: Daniel Linn  Today's Date: 6/1/2021  Problem List  Principal Problem:    Stroke-like symptoms  Active Problems:    GERD (gastroesophageal reflux disease)    Essential hypertension    Intracranial aneurysm    Past Medical History  Past Medical History:   Diagnosis Date    GERD (gastroesophageal reflux disease)     Hypertension     Urinary tract bacterial infections      Past Surgical History  Past Surgical History:   Procedure Laterality Date    ABDOMINAL SURGERY      COLONOSCOPY  2021    DILATION AND CURETTAGE, DIAGNOSTIC / THERAPEUTIC      HYSTERECTOMY      HYSTERECTOMY  2020    IR CEREBRAL ANGIOGRAPHY  2/5/2021    IR CEREBRAL ANGIOGRAPHY / INTERVENTION  5/14/2021    TONSILLECTOMY      TUBAL LIGATION           06/01/21 0810   OT Last Visit   OT Visit Date 06/01/21   Note Type   Note type Evaluation   Restrictions/Precautions   Weight Bearing Precautions Per Order No   Pain Assessment   Pain Assessment Tool 0-10   Pain Score No Pain   Home Living   Type of [address] [place] [address] Two level;Stairs to enter with rails  (2STE)   Bathroom Shower/Tub Walk-in shower   Bathroom Toilet Standard   Bathroom Equipment Grab bars in shower  (2 suction grab bars + 1 built-in grab bar  Pt was educated on safety concerns with suction grab bars; recommended installing grab bars in studs instead )   Bathroom Accessibility Accessible   Home Equipment Walker;Reacher   Additional Comments Bedroom and bathroom on second floor; full bath and guest room on first floor pt can use as needed  Prior Function   Level of [place] Independent with ADLs and functional mobility   Lives With Spouse   Receives Help From Family  ()   ADL Assistance Independent   IADLs Independent   Falls in the last 6 months 0   Vocational Full time employment   Lifestyle   Autonomy PTA pt was I all ADLs/IADLs/functional mobility/driving with no AD/DME use     Reciprocal Relationships    Service to Others 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[employer], cleans rest stops  Intrinsic Gratification Pt is a trained , also loves to garden  Psychosocial   Psychosocial (WDL) WDL   ADL   Eating Assistance 7  Independent   Grooming Assistance 7  Independent   UB Bathing Assistance 5  Supervision/Setup   LB Bathing Assistance 5  Supervision/Setup   UB Dressing Assistance 5  Supervision/Setup   LB Dressing Assistance 5  Supervision/Setup   Toileting Assistance  5  Supervision/Setup   Functional Assistance 5  Supervision/Setup   Bed Mobility   Supine to Sit 6  Modified independent   Additional items HOB elevated; Increased time required   Additional Comments Pt found supine in bed at start of session, left OOB in recliner with all needs within reach  Transfers   Sit to Stand 5  Supervision   Additional items Increased time required   Stand to Sit 5  Supervision   Additional items Increased time required   Functional Mobility   Functional Mobility 5  Supervision progressing to modified Independent   Additional Comments Pt ambulated with no DME household distance, reported no SOB, dizziness, or LOB  Balance   Static Sitting Good   Dynamic Sitting Fair +   Static Standing Fair   Dynamic Standing Fair -   Ambulatory Fair -   Activity Tolerance   Activity Tolerance Patient tolerated treatment well   Medical Staff Made Aware Co-evaluated with PT Denia due to pt 's medical complexity  Nurse Made Aware RN cleared pt for therapy   RUE Assessment   RUE Assessment WFL   LUE Assessment   LUE Assessment WFL   Hand Function   Gross Motor Coordination Functional   Fine Motor Coordination Functional   Sensation   Light Touch No apparent deficits   Proprioception   Proprioception No apparent deficits   Vision-Basic Assessment   Current Vision Wears glasses all the time   Patient Visual Report Other (Comment); Blurring of print when reading; Unable to keep objects in focus; Blurring of vision when changing focal distance  (Pt reports vision goes "haywire" frequently, when prompted to elaborate, unclear explanation provided  Pt described general blurriness of vision, no reported double vision or other deficits )   Vision - Complex Assessment   Ocular Range of Motion WFL   Head Position WDL   Tracking Able to track stimulus in all quads without difficulty   Convergence (Pt reported inability to focus on stimulus > 10 from nose)   Acuity Able to read clock/calendar on wall without difficulty; Able to read employee name badge without difficulty   Visual Screen Results Decreased visual acuity   Additional Comments Pt reported she has not been to optometrist recently; did not have glasses on hand that she reports she needs to wear at all times to see  Perception   Inattention/Neglect Appears intact   Motor Planning Appears intact   Perseveration Not present   Cognition   Overall Cognitive Status WFL   Arousal/Participation Alert; Responsive; Cooperative   Attention Within functional limits   Orientation Level Oriented X4   Memory Within functional limits   Following Commands Follows one step commands without difficulty   Assessment   Limitation Visual deficit; Decreased high-level ADLs   Prognosis Fair   Assessment Pt is a 56 y o  female female who was admitted to [facility] on 5/31/2021 with stroke-like symptoms including intermittent episodes of left upper extremity weakness/numbness and intermittent blurry vision with headache, worsening over the past few days  Pt underwent an unsuccessful IR embolization/coiling attempt on 05/14  Pt  has a past medical history of GERD (gastroesophageal reflux disease), Hypertension, and Urinary tract bacterial infections  Pt has active OT orders  OT was consulted to assess pt's functional status and occupational performance in order to determine discharge needs  Pt lives with her spouse Luis Alfredo Levy in a [address] [place] [address] with 2 [facility] and a first floor setup   PTA, pt was independent in ADLs, IADLs, & functional mobility, and used no DME at baseline  Pt drives at baseline  Currently, pt demonstrates the following occupational impairments: community mobility, driving, work/volunteer work, and leisure activities   These deficits result from the following performance deficits and barriers:safety  and visual perceptual skills , diplopia and nystagmus  From an OT standpoint, recommend discharge to when medically stable  Pt's raw score on the AM-PAC Daily Activity inpatient short form is 22, standardized score is 47 1, which is > 39 4  Patients at this level are likely to benefit from DC to home, however, please refer to therapist recommendation for safe DC planning  Pt would benefit from d/c back home with outpatient OT to assess fitness to drive, promote safety, and address immediate occupational deficits related to vision in order to return to [facility]  From an OT standpoint, no further inpatient rehabilitation needs      Goals   Patient Goals To get back to work   Recommendation   Recommendation Other (comment)  (Recommended pt sees her optometrist  Pt reported has not seen one in "a while ")   OT Discharge Recommendation Home with outpatient rehabilitation  (To address vision deficits and fit to drive test)   OT - OK to Discharge Yes   AM-PAC Daily Activity Inpatient   Lower Body Dressing 3   Bathing 3   Toileting 3   Upper Body Dressing 4   Grooming 4   Eating 4   Daily Activity Raw Score 21   Daily Activity Standardized Score (Calc for Raw Score >=11) 44 27   AM-PAC Applied Cognition Inpatient   Following a Speech/Presentation 4   Understanding Ordinary Conversation 4   Taking Medications 4   Remembering Where Things Are Placed or Put Away 4   Remembering List of 4-5 Errands 4   Taking Care of Complicated Tasks 3   Applied Cognition Raw Score 23   Applied Cognition Standardized Score 53 08   Barthel Index   Feeding 10   Bathing 5   Grooming Score 5   Dressing Score 10   Bladder Score 10   Bowels Score 10   Toilet Use Score 10 Transfers (Bed/Chair) Score 10   Mobility (Level Surface) Score 10   Stairs Score 5   Barthel Index Score 85

## 2021-06-01 NOTE — ASSESSMENT & PLAN NOTE
Patient presents with multiple from planes including left arm and face numbness    Imaging:  · MRI brain without 5/31:  No evidence of recent infarct  No left distal PICA aneurysm again identified with minimal surrounding T2 abnormality which is stable  Plan:  · Neurology consulted  · No neurosurgical intervention  · Continue ASA 81mg  · Discussed neck pain likely musculoskeletal   · Arm symptoms resolved without recurrence  Patient felt to be related with way she is sleeping  · Patient clear discharge from neurosurgical standpoint  Will plan for outpatient follow-up

## 2021-06-01 NOTE — SPEECH THERAPY NOTE
Consult received and chart reviewed  SLP s/w patient and RN  Pt passed RN dysphagia assessment and is tolerating regular diet with thin liquids with no s/s of aspiration  Speech/Language deficits also denied  Formal speech/swallow evaluation does not appear to be indicated at this time  If new concerns arise, please reconsult  Thank you

## 2021-06-01 NOTE — DISCHARGE INSTRUCTIONS
Prediabetes   WHAT YOU NEED TO KNOW:   Prediabetes is a blood glucose (sugar) level that is higher than normal  It is not high enough to be considered diabetes  Prediabetes increases your risk for type 2 diabetes and heart disease  DISCHARGE INSTRUCTIONS:   Call your doctor if:   · You have more hunger or thirst than usual     · You are urinating more often than usual     · You are always exhausted  · You have blurred vision  · You have questions or concerns about your condition or care  Medicines:   · Medicine  may be given if you are at high risk for type 2 diabetes  Medicine may also be given to lower high blood pressure and high cholesterol  · Take your medicine as directed  Contact your healthcare provider if you think your medicine is not helping or if you have side effects  Tell him or her if you are allergic to any medicine  Keep a list of the medicines, vitamins, and herbs you take  Include the amounts, and when and why you take them  Bring the list or the pill bottles to follow-up visits  Carry your medicine list with you in case of an emergency  Prevent or delay type 2 diabetes:  Healthy choices work best to delay or prevent type 2 diabetes  You can decrease your risk for type 2 diabetes by choosing the following:  · Get regular physical activity  Physical activity, such as exercise, can help decrease your blood sugar level  It can also help to decrease your risk for heart disease and help you lose weight  Adults should get at least 150 minutes (2 5 hours) of moderate physical activity every week  Spread the amount of activity over at least 3 days a week  Do not skip more than 2 days in a row  Children should get at least 60 minutes of moderate physical activity on most days of the week  Examples of moderate physical activity include brisk walking, running, and swimming  Do not sit for longer than 30 minutes at a time   Work with your healthcare provider to create a plan for physical activity  · Lose weight if you are overweight  A weight loss of 7% of your body weight can help to lower your blood sugar level  Your healthcare provider can tell you what weight is healthy for you  He or she can help you create a weight loss plan  · Eat healthy foods  Eat a variety of fruits and vegetables  Eat whole-grain foods more often  Choose dairy foods, meat, and other protein foods that are low in fat  Eat fewer sweets, such as candy, cookies, regular soda, and sweetened drinks  You can also decrease calories by eating smaller portion sizes  Work with your healthcare provider or dietitian to develop a meal plan that is right for you  · Take medicine as directed  Your healthcare provider may give diabetes medicine if you are at high risk for diabetes  You may also need medicines for high blood pressure and high cholesterol  · Follow up with your healthcare provider as directed  You will need to return every year to get tested for diabetes  · Do not smoke  Smoking may increase your risk for type 2 diabetes  Nicotine can damage blood vessels  Other health conditions, such as lung disease, can develop when you smoke  Do not use e-cigarettes or smokeless tobacco in place of cigarettes or to help you quit  They still contain nicotine  Ask your healthcare provider for information if you currently smoke and need help quitting  Follow up with your doctor as directed: You will need to return every year to get tested for diabetes  Write down your questions so you remember to ask them during your visits  © Copyright [address] Information is for End User's use only and may not be sold, redistributed or otherwise used for commercial purposes  All illustrations and images included in CareNotes® are the copyrighted property of [employer]  or [facility] Kelin Medellin   The above information is an  only   It is not intended as medical advice for individual conditions or treatments  Talk to your doctor, nurse or pharmacist before following any medical regimen to see if it is safe and effective for you

## 2021-06-03 ENCOUNTER — TELEPHONE (OUTPATIENT)
Dept: NEUROSURGERY | Facility: CLINIC | Age: 61
End: 2021-06-03

## 2021-06-03 NOTE — TELEPHONE ENCOUNTER
05/06/2022-PT CX APT-  Patient canceled, does not want to reschedule (pt wants to cancel, states she does not want to reschedule at this time)    05/03/2022-CALLED PT, LEFT MESSAGE ON MACHINE TO SCHEDULE CTA PRIOR TO F/U APT ON 05/25/2022 03/07/2022-CALLED PT, LEFT MESSAGE TO SCHEDULE CTA PRIOR TO 05/25/2022 APT  LEFT SL [facility] SCHEDULING PHONE# ON MACHINE AS WELL  06/04/2021-CALLED PT, LEFT MESSAGE ON MACHINE TO SCHEDULE 1 YR FOLLOW UP ON 05/25/2022  PT AGREED TO CALL HER IN [facility] 2022 TO SCHEDULE CTA HEAD & NECK PRIOR TO APT       06/03/2021-PT DISCHARGED TO HOME  NEEDS 1 YEAR FOLLOW UP FROM 05/24 APT AND CTA HEAD AND NECK TO BE SCHEDULED       06/01/2021-(MARILYN)SIGN OFF FOLLOW-UP OUTPATIENT  PATIENT CURRENTLY DECLINING INTERVENTION OPTIONS FOR ANEURYSM APPEARING PLANNING OUTPATIENT FOLLOW UP IN 1 YEAR WITH CTA

## 2021-06-08 ENCOUNTER — EVALUATION (OUTPATIENT)
Dept: OCCUPATIONAL THERAPY | Facility: CLINIC | Age: 61
End: 2021-06-08
Payer: COMMERCIAL

## 2021-06-08 DIAGNOSIS — I67.1 INTRACRANIAL ANEURYSM: Primary | ICD-10-CM

## 2021-06-08 PROCEDURE — 97535 SELF CARE MNGMENT TRAINING: CPT

## 2021-06-08 PROCEDURE — 97140 MANUAL THERAPY 1/> REGIONS: CPT

## 2021-06-08 PROCEDURE — 97167 OT EVAL HIGH COMPLEX 60 MIN: CPT

## 2021-06-08 NOTE — PROGRESS NOTES
OT Evaluation     Today's date: 2021  Patient name: Meri Linn  : 1960  MRN: [id number]  Referring provider: Ivan Ruiz PA-C  Dx:   Encounter Diagnosis     ICD-10-CM    1  Intracranial aneurysm  I67 1 Ambulatory referral to Occupational Therapy       Start Time: 1000  Stop Time: 1100  Total time in clinic (min): 60 minutes    Assessment  Assessment details: Patient, a 56 y o  female presenting to OP OT services secondary to intercranial aneurysm  Patient reports having an aneurysm in her cerebellum  She reports going to ER two weeks ago because she felt like she was having a stroke  She states she experienced dizziness and numbness in hand, neck, and face  Pt reports aneurysm symptoms having been going on for years  She states the finding of her aneurysm was about 11 years ago and was admitted to the hospital  She has recently been treated due to symptoms becoming worse  Patient states initially receiving care at [facility] in [place] and per chart review, pt received MRI and CT scan there on 2021  She is now currently seeing Dr Sudheer Bocanegra at [address] [place] [address] and has follow-up with neurologist in three months  Patient is employed and currently working as a  at the [employer]  She returns to work tomorrow  She is also a retired  at [employer]  Recently, 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 and when she turns her head from side to side  Patient wears prescription glasses daily for reading and driving  She notes having vertigo prior  She verbalized her vision is fine during the day and then by night her vision in her L eye becomes foggy  Patient notes she has hypertension and is currently on medication to monitor it  /80 at time of evaluation  Patient presenting with pain at cervical region of neck, pain along her LUE, numbness and tingling of hand and fingers, and decreased activity this date      Impairments: abnormal or restricted ROM, activity intolerance and pain with function    Symptom irritability: highUnderstanding of Dx/Px/POC: good   Prognosis: good    Goals  STGs    Patient will increase cervical ROM by 5-10 degrees to improve ROM and functional use  Patient will report a decrease in dizziness symptoms to no more than a 2/10 with cervical movements  Patient will report independence with HEP  Patient will increase sensation deficits by 25%          LTGs    Patient will increase cervical ROM to WNL to improve independence with daily function  Patient will report a decrease in dizziness symptoms of no more than 010 with cervical movements  Patient will increase sensation deficits by 50%    Plan  Plan details: Patient has presenting to OP OT services with a dx of an aneurysm  Patient demonstrating increased pain of cervical region, decreased ROM, decreased activity  Pt would benefit from continued Occupational Therapy services two times per week for 4 weeks to return to prior level of function and achieve all established goals   Thank you for the referral!    Patient would benefit from: OT eval and skilled occupational therapy  Referral necessary: Yes  Planned modality interventions: unattended electrical stimulation, ultrasound, thermotherapy: hydrocollator packs and cryotherapy  Planned therapy interventions: stretching, strengthening, patient education, manual therapy, massage, functional ROM exercises, therapeutic activities, therapeutic exercise and home exercise program  Frequency: 2x week  Duration in visits: 8  Duration in weeks: 4  Treatment plan discussed with: patient        Subjective Evaluation    History of Present Illness  Mechanism of injury: surgery  Mechanism of injury: Aneurysm  Cervical Pain          Not a recurrent problem   Quality of life: fair    Pain  Current pain rating: 3  At best pain rating: 3  At worst pain ratin (increase in BP)  Location: Cervical Neck  Quality: dull ache and sharp (sharp pain with certain movements)  Relieving factors: medications (Metatropin; blood pressure medicine)    Social Support  Lives with: spouse    Employment status: working (cleaning)  Hand dominance: right    Treatments  Current treatment: occupational therapy  Patient Goals  Patient goals for therapy: decreased pain, increased motion, increased strength, independence with ADLs/IADLs and return to work          Objective     Neurological Testing     Sensation     Shoulder   Left Shoulder   Diminished: light touch    Right Shoulder   Intact: light touch    Additional Neurological Details  Patient repots experiencing sensation deficits down left upper extremity from cervical neck to tips of digits   Patient reports sensation deficits are comparable to radial nerve distribtuion    Active Range of Motion   Cervical/Thoracic Spine       Cervical    Flexion: 50 degrees   Extension: 50 degrees      Left lateral flexion: 28 degrees      Right lateral flexion: 15 degrees     Restriction level moderate  Left rotation: 28 degrees  Right rotation: 30 degrees    Restriction level: minimal  Left Shoulder   Normal active range of motion    Right Shoulder   Normal active range of motion    Left Elbow   Normal active range of motion    Right Elbow   Normal active range of motion    Left Wrist   Normal active range of motion    Right Wrist   Normal active range of motion    Left Thumb   Opposition: WNL    Right Thumb   Opposition: WNL    Additional Active Range of Motion Details  Composite fist noted    Strength/Myotome Testing     Left Shoulder     Planes of Motion   Flexion: 3+   Extension: 3+   Abduction: 3+   Adduction: 3+     Right Shoulder     Planes of Motion   Flexion: 4-   Extension: 4-   Abduction: 4-   Adduction: 4-     Left Elbow   Flexion: 3+  Extension: 3+  Forearm supination: 3+  Forearm pronation: 3+    Right Elbow   Flexion: 4-  Extension: 4-  Forearm supination: 4-  Forearm pronation: 4-    Left Wrist/Hand   Normal wrist strength    Right Wrist/Hand   Normal wrist strength       Vision Screen  Visual Acuity: 20/25  Convergence: 5 inches  Pursuits: smooth and accurate; dizziness noted  Saccades: smooth and accurate; dizziness noted   ROM: [facility]    Patient and therapist discussed exercises per HEP  Patient demonstrated and verbalized understanding of technique of exercises and stretches         Precautions: Aneurism      Manuals 06/08/2021       Massage        IASTM Performed cervical region  GT #4                       Neuro Re-Ed  06/08/2021                                                               Ther Ex 06/08/2021       Levator Scap Stretch HEP       Scalene Stretch HEP       Cervical ROM  Flex  Rot HEP       Median Nerve Stretch Against Wall HEP       Radial Nerve Glide  Wait[place]        Ulnar Nerve [place]  Racoon Eyes  Ball Roll Out        Chin Tucks        Rhomboid Stretch        Cat/Camel on Wall or Quadruped on Mat         Isometrics  Front  Back  Left  Right        Shrugs  Rolls  Retraction        Thera-Band  Retraction  Serratus Punch  Hugs                Ther Activity 06/08/2021                                               Modalities 06/08/2021       HP Performed at end of session       CP        E-Stim w/ MH        Ultrasound              Documentation and treatment completed this session by VAN Hubbard under the direct supervision of Benton Vivas MS, OTR/L

## 2021-06-15 ENCOUNTER — OFFICE VISIT (OUTPATIENT)
Dept: OCCUPATIONAL THERAPY | Facility: CLINIC | Age: 61
End: 2021-06-15
Payer: COMMERCIAL

## 2021-06-15 DIAGNOSIS — I67.1 INTRACRANIAL ANEURYSM: Primary | ICD-10-CM

## 2021-06-15 PROCEDURE — 97110 THERAPEUTIC EXERCISES: CPT

## 2021-06-15 PROCEDURE — 97140 MANUAL THERAPY 1/> REGIONS: CPT

## 2021-06-15 NOTE — PROGRESS NOTES
Daily Note     Today's date: 6/15/2021  Patient name: Bekah Linn  : 1960  MRN: [id number]  Referring provider: Maame Patel PA-C  Dx:   Encounter Diagnosis     ICD-10-CM    1  Intracranial aneurysm  I67 1        Start Time: 0800  Stop Time: 0900  Total time in clinic (min): 60 minutes    Subjective: "It felt good after I left last time "      Objective: See treatment diary below      Assessment: Tolerated treatment well  Patient exhibited good technique with therapeutic exercises and would benefit from continued OT  Patient reports feeling better but is still experiencing pain in neck especially following work  Patient reports pain pre-and-post-tx is a 2  She states pain is a 6 or 7 at night  Patient notes taking tylenol at night to help with pain  She mentioned being able to do more at home and is now able to garden and turn her neck while driving or in the car  Pt notes heat has helped her symptoms  Patient states positive results with neck stretches and nerve glides  Pt experiencing symptoms for all three nerve glide stretches  She notes compliance with HEP and use of heat at home to relieve symptoms  Plan: Continue per plan of care  Progress treatment as tolerated         Precautions: Aneurism      Manuals 2021 06/15/2021      Massage        IASTM Performed cervical region  GT #4 Performed cervical region GT #4                      Neuro Re-Ed  2021 06/15/2021                                                              Ther Ex 2021 06/15/2021      Levator Scap Stretch HEP 20 sec x 3      Scalene Stretch HEP 20 sec x 3      Cervical ROM  Flex  Rot HEP       Median Nerve Stretch Against Wall HEP 20 sec x 3      Radial Nerve Glide  Waiters [place]  X 10      Ulnar Nerve [place]  Racoon Eyes  Ball Roll Out      X 10  X 10      Chin Tucks  X 10      Rhomboid Stretch        Cat/Camel on Wall or Quadruped on Mat   X 10      Isometrics  Front  Back  Left  Right    5 sec X 5  5 sec X 5  5 sec x 5  5 sec x 5      Shrugs  Rolls  Retraction  X 20  X 20  X 20      Thera-Band  Retraction  Serratus Punch  Hugs                Ther Activity 06/08/2021 06/15/2021                                              Modalities 06/08/2021 06/15/2021      HP Performed at end of session Performed at end of session      CP        E-Stim w/ MH        Ultrasound              Documentation and treatment completed this session by VAN Fierro under the direct supervision of Benton Vivas MS, OTR/L

## 2021-06-16 ENCOUNTER — OFFICE VISIT (OUTPATIENT)
Dept: OCCUPATIONAL THERAPY | Facility: CLINIC | Age: 61
End: 2021-06-16
Payer: COMMERCIAL

## 2021-06-16 DIAGNOSIS — I67.1 INTRACRANIAL ANEURYSM: Primary | ICD-10-CM

## 2021-06-16 PROCEDURE — 97110 THERAPEUTIC EXERCISES: CPT

## 2021-06-16 PROCEDURE — 97535 SELF CARE MNGMENT TRAINING: CPT

## 2021-06-16 PROCEDURE — 97140 MANUAL THERAPY 1/> REGIONS: CPT

## 2021-06-16 NOTE — PROGRESS NOTES
Daily Note     Today's date: 2021  Patient name: Bravo Linn  : 1960  MRN: [id number]  Referring provider: Kathie Jorge PA-C  Dx:   Encounter Diagnosis     ICD-10-CM    1  Intracranial aneurysm  I67 1                   Subjective: "I'am feeling good "      Objective: See treatment diary below      Assessment: Tolerated treatment well  Patient exhibited good technique with therapeutic exercises and would benefit from continued OT  Patient states she is feeling much better and thinks this will be her last week  Patient verbalized compliance with HEP and stretches which have been helping to relieve symptoms  Patient notes being able to garden yesterday with no pain  Therapist reviewed additional exercises and HEP with patient this session  Patient verbalized understanding and demonstrated proper form with new exercises and stretches  Patient OT services will be on hold/as needed  Plan: Continue per plan of care  Progress treatment as tolerated         Precautions: Aneurism      Manuals 2021 06/15/2021 2021     Massage        IASTM Performed cervical region  GT #4 Performed cervical region GT #4 Performed cervical region GT #4                     Neuro Re-Ed  2021 06/15/2021 2021                                                             Ther Ex 2021 06/15/2021 2021     Levator Scap Stretch HEP 20 sec x 3 20 sec x 3     Scalene Stretch HEP 20 sec x 3 20 sec x 3     Cervical ROM  Flex/Ext  Rot HEP    X 10  X 10     Median Nerve Stretch Against Wall HEP 20 sec x 3 20 sec x 3     Radial Nerve Glide  Waiters Tip  X 10 X 10     Ulnar Nerve [place]  Racoon Eyes  Ball Roll Out      X 10  X 10     X 10  X 10     Chin Tucks  X 10 X 10     Rhomboid Stretch   20 sec x 5     Cat/Camel on Wall or Quadruped on Mat   X 10      Isometrics  Front  Back  Left  Right    5 sec X 5  5 sec X 5  5 sec x 5  5 sec x 5   5 sec x 5  5 sec x 5  5 sec x 5  5 sec x 5 Shrugs  Rolls  Retraction  X 20  X 20  X 20 X 20   X 20  X 20     Thera-Band  Retraction  Serratus Punch  Hugs     X 20  X 20             Ther Activity 06/08/2021 06/15/2021 06/16/2021                                             Modalities 06/08/2021 06/15/2021 06/16/2021      Performed at end of session Performed at end of session Performed at end of session     CP        E-Stim w/ DOMINICK        Ultrasound              Documentation and treatment completed this session by VAN Merida under the direct supervision of Shama Gonzalez MS, OTR/L

## 2021-06-22 ENCOUNTER — APPOINTMENT (OUTPATIENT)
Dept: OCCUPATIONAL THERAPY | Facility: CLINIC | Age: 61
End: 2021-06-22
Payer: COMMERCIAL

## 2021-06-23 ENCOUNTER — APPOINTMENT (OUTPATIENT)
Dept: OCCUPATIONAL THERAPY | Facility: CLINIC | Age: 61
End: 2021-06-23
Payer: COMMERCIAL

## 2021-07-01 ENCOUNTER — TELEPHONE (OUTPATIENT)
Dept: NEUROSURGERY | Facility: CLINIC | Age: 61
End: 2021-07-01

## 2021-07-01 NOTE — TELEPHONE ENCOUNTER
Attempted to contact Conrad Lubin regarding decision to forego 3m angio  Left a message to return call  Will confirm she her fup preference  1 Year fup with CTA?

## 2021-07-01 NOTE — TELEPHONE ENCOUNTER
----- Message from Cesar Cantor sent at 7/1/2021  9:32 AM EDT -----  Regarding: FW: follow-up  Patient left me a message back stating that she doesn't want to schedule Angiogram in 3 months   ----- Message -----  From: Brenda Yousif MD  Sent: 6/1/2021   5:24 PM EDT  To: Neeta Barraza, #  Subject: RE: follow-up                                    Hopefully she will do angio in 3m first, there were going to think about this  Elinor Pillion we may have to call and fu to see if she wants to schedule this  ----- Message -----  From: Leland Chu PA-C  Sent: 6/1/2021   3:39 PM EDT  To: Neeta Barraza, #  Subject: follow-up                                        Patient with known left PICA aneurysm  Last seen by Dr Ronaldo Hicks last week and discuss treatment options  At this time patient has decided against treatment options and request ongoing follow-up  Per last note plan was for CTA in one year  Please call to assist with scheduling one year follow-up appointment  I have ordered CTA

## 2021-07-01 NOTE — TELEPHONE ENCOUNTER
Callback received from Tika Zaman confirming she would prefer not to undergo formal angio at this time  Explained that the angio at 3m is preferred per Dr Rita Marcus  She states she is feeling well overall and would like to "leave it in gods hands " Does not want to "disturb" anything that may cause problems  Is planning on proceeding with 1 year fup and CTA  Confirmed her appt for 5/25/2022 and directed her to ensure CTA is completed prior to this visit  She stated and understanding and was appreciative

## 2021-07-21 DIAGNOSIS — I67.1 INTRACRANIAL ANEURYSM: ICD-10-CM

## 2021-07-26 RX ORDER — ROSUVASTATIN CALCIUM 5 MG/1
TABLET, COATED ORAL
Qty: 90 TABLET | Refills: 1 | Status: SHIPPED | OUTPATIENT
Start: 2021-07-26 | End: 2022-07-26

## 2021-07-28 NOTE — PROGRESS NOTES
OT DISCHARGE    Patient has Consistent attended OP OT services since start of care  Patient has attended 3 visits since start of care  Patient last missed visit/visit was on 06/16/2021  Patient and therapist discussed discontinued and have agreed on Plan of Care  Patient achieved all goals in plan of care  Thank you for the referral  Please refer to patient's last assessment for most recent objective measurements

## 2022-04-14 ENCOUNTER — APPOINTMENT (OUTPATIENT)
Dept: LAB | Facility: CLINIC | Age: 62
End: 2022-04-14
Payer: COMMERCIAL

## 2022-04-14 DIAGNOSIS — E78.2 MIXED HYPERLIPIDEMIA: ICD-10-CM

## 2022-04-14 DIAGNOSIS — E55.9 AVITAMINOSIS D: ICD-10-CM

## 2022-04-14 DIAGNOSIS — R73.03 DIABETES MELLITUS, LATENT: ICD-10-CM

## 2022-04-14 LAB
25(OH)D3 SERPL-MCNC: 28.5 NG/ML (ref 30–100)
ALBUMIN SERPL BCP-MCNC: 3.6 G/DL (ref 3.5–5)
ALP SERPL-CCNC: 100 U/L (ref 46–116)
ALT SERPL W P-5'-P-CCNC: 24 U/L (ref 12–78)
ANION GAP SERPL CALCULATED.3IONS-SCNC: 7 MMOL/L (ref 4–13)
AST SERPL W P-5'-P-CCNC: 16 U/L (ref 5–45)
BASOPHILS # BLD AUTO: 0.06 THOUSANDS/ΜL (ref 0–0.1)
BASOPHILS NFR BLD AUTO: 1 % (ref 0–1)
BILIRUB SERPL-MCNC: 0.97 MG/DL (ref 0.2–1)
BUN SERPL-MCNC: 25 MG/DL (ref 5–25)
CALCIUM SERPL-MCNC: 9.1 MG/DL (ref 8.3–10.1)
CHLORIDE SERPL-SCNC: 107 MMOL/L (ref 100–108)
CHOLEST SERPL-MCNC: 203 MG/DL
CO2 SERPL-SCNC: 26 MMOL/L (ref 21–32)
CREAT SERPL-MCNC: 0.72 MG/DL (ref 0.6–1.3)
EOSINOPHIL # BLD AUTO: 0.12 THOUSAND/ΜL (ref 0–0.61)
EOSINOPHIL NFR BLD AUTO: 2 % (ref 0–6)
ERYTHROCYTE [DISTWIDTH] IN BLOOD BY AUTOMATED COUNT: 12.5 % (ref 11.6–15.1)
EST. AVERAGE GLUCOSE BLD GHB EST-MCNC: 123 MG/DL
GFR SERPL CREATININE-BSD FRML MDRD: 89 ML/MIN/1.73SQ M
GLUCOSE P FAST SERPL-MCNC: 99 MG/DL (ref 65–99)
HBA1C MFR BLD: 5.9 %
HCT VFR BLD AUTO: 45.4 % (ref 34.8–46.1)
HDLC SERPL-MCNC: 41 MG/DL
HGB BLD-MCNC: 14.9 G/DL (ref 11.5–15.4)
IMM GRANULOCYTES # BLD AUTO: 0.04 THOUSAND/UL (ref 0–0.2)
IMM GRANULOCYTES NFR BLD AUTO: 1 % (ref 0–2)
LDLC SERPL CALC-MCNC: 137 MG/DL (ref 0–100)
LYMPHOCYTES # BLD AUTO: 1.73 THOUSANDS/ΜL (ref 0.6–4.47)
LYMPHOCYTES NFR BLD AUTO: 29 % (ref 14–44)
MCH RBC QN AUTO: 30.3 PG (ref 26.8–34.3)
MCHC RBC AUTO-ENTMCNC: 32.8 G/DL (ref 31.4–37.4)
MCV RBC AUTO: 92 FL (ref 82–98)
MONOCYTES # BLD AUTO: 0.53 THOUSAND/ΜL (ref 0.17–1.22)
MONOCYTES NFR BLD AUTO: 9 % (ref 4–12)
NEUTROPHILS # BLD AUTO: 3.42 THOUSANDS/ΜL (ref 1.85–7.62)
NEUTS SEG NFR BLD AUTO: 58 % (ref 43–75)
NONHDLC SERPL-MCNC: 162 MG/DL
NRBC BLD AUTO-RTO: 0 /100 WBCS
PLATELET # BLD AUTO: 260 THOUSANDS/UL (ref 149–390)
PMV BLD AUTO: 10.7 FL (ref 8.9–12.7)
POTASSIUM SERPL-SCNC: 3.9 MMOL/L (ref 3.5–5.3)
PROT SERPL-MCNC: 6.6 G/DL (ref 6.4–8.2)
RBC # BLD AUTO: 4.92 MILLION/UL (ref 3.81–5.12)
SODIUM SERPL-SCNC: 140 MMOL/L (ref 136–145)
TRIGL SERPL-MCNC: 125 MG/DL
TSH SERPL DL<=0.05 MIU/L-ACNC: 1.38 UIU/ML (ref 0.45–4.5)
WBC # BLD AUTO: 5.9 THOUSAND/UL (ref 4.31–10.16)

## 2022-04-14 PROCEDURE — 80061 LIPID PANEL: CPT

## 2022-04-14 PROCEDURE — 80053 COMPREHEN METABOLIC PANEL: CPT

## 2022-04-14 PROCEDURE — 83036 HEMOGLOBIN GLYCOSYLATED A1C: CPT

## 2022-04-14 PROCEDURE — 85025 COMPLETE CBC W/AUTO DIFF WBC: CPT

## 2022-04-14 PROCEDURE — 36415 COLL VENOUS BLD VENIPUNCTURE: CPT

## 2022-04-14 PROCEDURE — 82306 VITAMIN D 25 HYDROXY: CPT

## 2022-04-14 PROCEDURE — 84443 ASSAY THYROID STIM HORMONE: CPT

## 2022-06-26 ENCOUNTER — APPOINTMENT (EMERGENCY)
Dept: CT IMAGING | Facility: HOSPITAL | Age: 62
End: 2022-06-26
Payer: COMMERCIAL

## 2022-06-26 ENCOUNTER — HOSPITAL ENCOUNTER (EMERGENCY)
Facility: HOSPITAL | Age: 62
Discharge: HOME | End: 2022-06-26
Attending: EMERGENCY MEDICINE
Payer: COMMERCIAL

## 2022-06-26 VITALS
HEART RATE: 83 BPM | BODY MASS INDEX: 31.01 KG/M2 | WEIGHT: 175 LBS | HEIGHT: 63 IN | OXYGEN SATURATION: 97 % | RESPIRATION RATE: 20 BRPM | SYSTOLIC BLOOD PRESSURE: 137 MMHG | TEMPERATURE: 98.1 F | DIASTOLIC BLOOD PRESSURE: 97 MMHG

## 2022-06-26 DIAGNOSIS — N20.1 RIGHT URETERAL STONE: Primary | ICD-10-CM

## 2022-06-26 LAB
ALBUMIN SERPL BCP-MCNC: 4.2 G/DL (ref 3.5–5)
ALP SERPL-CCNC: 103 U/L (ref 34–104)
ALT SERPL W P-5'-P-CCNC: 18 U/L (ref 7–52)
ANION GAP SERPL CALCULATED.3IONS-SCNC: 10 MMOL/L (ref 4–13)
AST SERPL W P-5'-P-CCNC: 15 U/L (ref 13–39)
BASOPHILS # BLD AUTO: 0.04 THOUSANDS/ΜL (ref 0–0.1)
BASOPHILS NFR BLD AUTO: 1 % (ref 0–1)
BILIRUB SERPL-MCNC: 0.68 MG/DL (ref 0.2–1)
BILIRUB UR QL STRIP: NEGATIVE
BUN SERPL-MCNC: 12 MG/DL (ref 5–25)
CALCIUM SERPL-MCNC: 9.2 MG/DL (ref 8.4–10.2)
CHLORIDE SERPL-SCNC: 106 MMOL/L (ref 96–108)
CLARITY UR: CLEAR
CO2 SERPL-SCNC: 25 MMOL/L (ref 21–32)
COLOR UR: YELLOW
CREAT SERPL-MCNC: 0.68 MG/DL (ref 0.6–1.3)
EOSINOPHIL # BLD AUTO: 0.08 THOUSAND/ΜL (ref 0–0.61)
EOSINOPHIL NFR BLD AUTO: 1 % (ref 0–6)
ERYTHROCYTE [DISTWIDTH] IN BLOOD BY AUTOMATED COUNT: 12.4 % (ref 11.6–15.1)
GFR SERPL CREATININE-BSD FRML MDRD: 92 ML/MIN/1.73SQ M
GLUCOSE SERPL-MCNC: 101 MG/DL (ref 65–140)
GLUCOSE UR STRIP-MCNC: NEGATIVE MG/DL
HCT VFR BLD AUTO: 44.8 % (ref 34.8–46.1)
HGB BLD-MCNC: 14.8 G/DL (ref 11.5–15.4)
HGB UR QL STRIP.AUTO: NEGATIVE
IMM GRANULOCYTES # BLD AUTO: 0.01 THOUSAND/UL (ref 0–0.2)
IMM GRANULOCYTES NFR BLD AUTO: 0 % (ref 0–2)
KETONES UR STRIP-MCNC: NEGATIVE MG/DL
LEUKOCYTE ESTERASE UR QL STRIP: NEGATIVE
LIPASE SERPL-CCNC: 20 U/L (ref 11–82)
LYMPHOCYTES # BLD AUTO: 1.35 THOUSANDS/ΜL (ref 0.6–4.47)
LYMPHOCYTES NFR BLD AUTO: 20 % (ref 14–44)
MCH RBC QN AUTO: 30.8 PG (ref 26.8–34.3)
MCHC RBC AUTO-ENTMCNC: 33 G/DL (ref 31.4–37.4)
MCV RBC AUTO: 93 FL (ref 82–98)
MONOCYTES # BLD AUTO: 0.58 THOUSAND/ΜL (ref 0.17–1.22)
MONOCYTES NFR BLD AUTO: 9 % (ref 4–12)
NEUTROPHILS # BLD AUTO: 4.62 THOUSANDS/ΜL (ref 1.85–7.62)
NEUTS SEG NFR BLD AUTO: 69 % (ref 43–75)
NITRITE UR QL STRIP: NEGATIVE
NRBC BLD AUTO-RTO: 0 /100 WBCS
PH UR STRIP.AUTO: 6 [PH]
PLATELET # BLD AUTO: 260 THOUSANDS/UL (ref 149–390)
PMV BLD AUTO: 9.8 FL (ref 8.9–12.7)
POTASSIUM SERPL-SCNC: 4 MMOL/L (ref 3.5–5.3)
PROT SERPL-MCNC: 6.5 G/DL (ref 6.4–8.4)
PROT UR STRIP-MCNC: NEGATIVE MG/DL
RBC # BLD AUTO: 4.8 MILLION/UL (ref 3.81–5.12)
SODIUM SERPL-SCNC: 141 MMOL/L (ref 135–147)
SP GR UR STRIP.AUTO: 1.01 (ref 1–1.03)
UROBILINOGEN UR QL STRIP.AUTO: 0.2 E.U./DL
WBC # BLD AUTO: 6.68 THOUSAND/UL (ref 4.31–10.16)

## 2022-06-26 PROCEDURE — 85025 COMPLETE CBC W/AUTO DIFF WBC: CPT

## 2022-06-26 PROCEDURE — 83690 ASSAY OF LIPASE: CPT

## 2022-06-26 PROCEDURE — 81003 URINALYSIS AUTO W/O SCOPE: CPT | Performed by: EMERGENCY MEDICINE

## 2022-06-26 PROCEDURE — 74176 CT ABD & PELVIS W/O CONTRAST: CPT

## 2022-06-26 PROCEDURE — 36415 COLL VENOUS BLD VENIPUNCTURE: CPT

## 2022-06-26 PROCEDURE — 99284 EMERGENCY DEPT VISIT MOD MDM: CPT

## 2022-06-26 PROCEDURE — 80053 COMPREHEN METABOLIC PANEL: CPT

## 2022-06-26 PROCEDURE — 99285 EMERGENCY DEPT VISIT HI MDM: CPT

## 2022-06-26 RX ORDER — ONDANSETRON 4 MG/1
4 TABLET, ORALLY DISINTEGRATING ORAL EVERY 6 HOURS PRN
Qty: 20 TABLET | Refills: 0 | Status: SHIPPED | OUTPATIENT
Start: 2022-06-26 | End: 2022-07-26

## 2022-06-26 RX ORDER — OXYCODONE HYDROCHLORIDE 5 MG/1
5 TABLET ORAL EVERY 4 HOURS PRN
Qty: 10 TABLET | Refills: 0 | Status: SHIPPED | OUTPATIENT
Start: 2022-06-26 | End: 2022-07-06

## 2022-06-26 RX ORDER — TAMSULOSIN HYDROCHLORIDE 0.4 MG/1
0.4 CAPSULE ORAL
Qty: 10 CAPSULE | Refills: 0 | Status: SHIPPED | OUTPATIENT
Start: 2022-06-26 | End: 2022-07-26

## 2022-06-26 RX ORDER — OXYCODONE HYDROCHLORIDE 5 MG/1
5 TABLET ORAL ONCE
Status: COMPLETED | OUTPATIENT
Start: 2022-06-26 | End: 2022-06-26

## 2022-06-26 RX ADMIN — OXYCODONE HYDROCHLORIDE 5 MG: 5 TABLET ORAL at 13:33

## 2022-06-26 NOTE — ED PROVIDER NOTES
History  Chief Complaint   Patient presents with    Abdominal Pain    Possible UTI     59-year-old female with history of GERD hypertension presents to the emergency department for evaluation of severe abdominal pain in the suprapubic region that started this morning around 2:00 a m  Juliojonathon Jensen Patient reporting burning with urination, spasms in the suprapubic area, intermittent nausea, pain that she rates an 8 of 10  She describes her pain as a constant burning with intermittent spasm in the suprapubic region  Past surgical history includes a total hysterectomy  Patient denies hematuria, upper abdominal pain, vomiting, constipation, diarrhea, chest pain, shortness a breath, fever  History provided by:  Patient   used: No    Abdominal Pain  Pain location:  Suprapubic  Pain quality: burning    Pain radiates to:  Does not radiate  Pain severity:  Severe  Onset quality:  Sudden  Duration:  10 hours  Timing:  Constant  Progression:  Unchanged  Chronicity:  New  Context: awakening from sleep and previous surgery (Hysterectomy with bilateral salpingo oophorectomy)    Context: not eating, not recent illness and not sick contacts    Relieved by:  None tried  Worsened by:  Nothing  Ineffective treatments:  None tried  Associated symptoms: dysuria and nausea (Intermittent)    Associated symptoms: no chest pain, no chills, no constipation, no cough, no diarrhea, no fever, no hematuria, no shortness of breath, no sore throat, no vaginal bleeding and no vomiting    Dysuria:     Severity:  Moderate    Onset quality:  Sudden    Duration:  10 hours    Timing:  Constant    Progression:  Unchanged    Chronicity:  New  Nausea:     Severity:  Mild    Timing:  Intermittent      Prior to Admission Medications   Prescriptions Last Dose Informant Patient Reported? Taking?    Multiple Vitamins-Minerals (MULTIVITAMIN ADULTS 50+ PO)   Yes No   Sig: Take 1 tablet by mouth daily -takes beet root and green vegg supplement Multiple Vitamins-Minerals (ZINC PO)   Yes No   Sig: Take 1 tablet by mouth as needed    amLODIPine (NORVASC) 5 mg tablet   Yes No   Sig: Take 5 mg by mouth daily   aspirin 81 mg chewable tablet   No No   Sig: Chew 1 tablet (81 mg total) daily   cyanocobalamin (VITAMIN B-12) 1000 MCG tablet   Yes No   Sig: Take 1,000 mcg by mouth as needed    famotidine (PEPCID) 40 MG tablet   Yes No   Sig: Take 40 mg by mouth daily   metoprolol succinate (TOPROL-XL) 50 mg 24 hr tablet  Self Yes No   Sig: Take 25 mg by mouth daily    rosuvastatin (CRESTOR) 5 mg tablet Not Taking at Unknown time  No No   Sig: TAKE 1 TABLET BY MOUTH EVERY DAY   Patient not taking: Reported on 6/26/2022      Facility-Administered Medications: None       Past Medical History:   Diagnosis Date    GERD (gastroesophageal reflux disease)     Hypertension     Urinary tract bacterial infections        Past Surgical History:   Procedure Laterality Date    ABDOMINAL SURGERY      COLONOSCOPY  2021    DILATION AND CURETTAGE, DIAGNOSTIC / THERAPEUTIC      HYSTERECTOMY      HYSTERECTOMY  2020    IR CEREBRAL ANGIOGRAPHY  2/5/2021    IR CEREBRAL ANGIOGRAPHY / INTERVENTION  5/14/2021    TONSILLECTOMY      TUBAL LIGATION         Family History   Problem Relation Age of Onset    Hypertension Mother     Hypertension Father      I have reviewed and agree with the history as documented  E-Cigarette/Vaping    E-Cigarette Use Never User      E-Cigarette/Vaping Substances    Nicotine No     THC No     CBD No     Flavoring No     Other No     Unknown No      Social History     Tobacco Use    Smoking status: Former Smoker     Types: Cigarettes     Quit date: 4/15/2007     Years since quitting: 15 2    Smokeless tobacco: Never Used   Vaping Use    Vaping Use: Never used   Substance Use Topics    Alcohol use: Yes     Comment: social    Drug use: Yes     Types: Marijuana     Comment: Marijuana at 14and 23years old          Review of Systems Constitutional: Negative for chills and fever  HENT: Negative for ear pain and sore throat  Eyes: Negative for pain and visual disturbance  Respiratory: Negative for cough and shortness of breath  Cardiovascular: Negative for chest pain and palpitations  Gastrointestinal: Positive for abdominal pain (suprapubic) and nausea (Intermittent)  Negative for constipation, diarrhea and vomiting  Genitourinary: Positive for dysuria and frequency  Negative for hematuria and vaginal bleeding  Musculoskeletal: Negative for arthralgias and back pain  Skin: Negative for color change and rash  Neurological: Negative for dizziness, seizures, syncope and headaches  Psychiatric/Behavioral: Negative for confusion  All other systems reviewed and are negative  Physical Exam  Physical Exam  Vitals and nursing note reviewed  Constitutional:       General: She is not in acute distress  Appearance: Normal appearance  She is well-developed and normal weight  She is not ill-appearing  HENT:      Head: Normocephalic and atraumatic  Right Ear: External ear normal       Left Ear: External ear normal       Nose: Nose normal       Mouth/Throat:      Mouth: Mucous membranes are moist       Pharynx: Oropharynx is clear  No oropharyngeal exudate or posterior oropharyngeal erythema  Eyes:      General: No scleral icterus  Right eye: No discharge  Left eye: No discharge  Extraocular Movements: Extraocular movements intact  Conjunctiva/sclera: Conjunctivae normal       Pupils: Pupils are equal, round, and reactive to light  Cardiovascular:      Rate and Rhythm: Normal rate and regular rhythm  Heart sounds: Normal heart sounds  No murmur heard  Pulmonary:      Effort: Pulmonary effort is normal  No respiratory distress  Breath sounds: Normal breath sounds  No wheezing or rales  Chest:      Chest wall: No tenderness  Abdominal:      General: Abdomen is flat   There is no distension  Palpations: Abdomen is soft  Tenderness: There is abdominal tenderness (mild) in the suprapubic area  There is no right CVA tenderness, left CVA tenderness, guarding or rebound  Negative signs include McBurney's sign, psoas sign and obturator sign  Musculoskeletal:         General: No tenderness, deformity or signs of injury  Normal range of motion  Cervical back: Normal range of motion and neck supple  No rigidity or tenderness  Skin:     General: Skin is warm and dry  Capillary Refill: Capillary refill takes less than 2 seconds  Findings: No erythema or rash  Neurological:      General: No focal deficit present  Mental Status: She is alert and oriented to person, place, and time  Mental status is at baseline  Motor: No weakness  Psychiatric:         Mood and Affect: Mood normal          Behavior: Behavior normal          Thought Content:  Thought content normal          Vital Signs  ED Triage Vitals [06/26/22 1127]   Temperature Pulse Respirations Blood Pressure SpO2   98 1 °F (36 7 °C) 83 20 137/97 97 %      Temp Source Heart Rate Source Patient Position - Orthostatic VS BP Location FiO2 (%)   Temporal Monitor Sitting Left arm --      Pain Score       8           Vitals:    06/26/22 1127   BP: 137/97   Pulse: 83   Patient Position - Orthostatic VS: Sitting         Visual Acuity      ED Medications  Medications   oxyCODONE (ROXICODONE) IR tablet 5 mg (5 mg Oral Given 6/26/22 1333)       Diagnostic Studies  Results Reviewed     Procedure Component Value Units Date/Time    Comprehensive metabolic panel [[id number]] Collected: 06/26/22 1221    Lab Status: Final result Specimen: Blood from Arm, Right Updated: 06/26/22 1247     Sodium 141 mmol/L      Potassium 4 0 mmol/L      Chloride 106 mmol/L      CO2 25 mmol/L      ANION GAP 10 mmol/L      BUN 12 mg/dL      Creatinine 0 68 mg/dL      Glucose 101 mg/dL      Calcium 9 2 mg/dL      AST 15 U/L      ALT 18 U/L Alkaline Phosphatase 103 U/L      Total Protein 6 5 g/dL      Albumin 4 2 g/dL      Total Bilirubin 0 68 mg/dL      eGFR 92 ml/min/1 73sq m     Narrative:      National Kidney Disease Foundation guidelines for Chronic Kidney Disease (CKD):     Stage 1 with normal or high GFR (GFR > 90 mL/min/1 73 square meters)    Stage 2 Mild CKD (GFR = 60-89 mL/min/1 73 square meters)    Stage 3A Moderate CKD (GFR = 45-59 mL/min/1 73 square meters)    Stage 3B Moderate CKD (GFR = 30-44 mL/min/1 73 square meters)    Stage 4 Severe CKD (GFR = 15-29 mL/min/1 73 square meters)    Stage 5 End Stage CKD (GFR <15 mL/min/1 73 square meters)  Note: GFR calculation is accurate only with a steady state creatinine    Lipase [[id number]]  (Normal) Collected: 06/26/22 1221    Lab Status: Final result Specimen: Blood from Arm, Right Updated: 06/26/22 1247     Lipase 20 u/L     CBC and differential [[id number]] Collected: 06/26/22 1221    Lab Status: Final result Specimen: Blood from Arm, Right Updated: 06/26/22 1227     WBC 6 68 Thousand/uL      RBC 4 80 Million/uL      Hemoglobin 14 8 g/dL      Hematocrit 44 8 %      MCV 93 fL      MCH 30 8 pg      MCHC 33 0 g/dL      RDW 12 4 %      MPV 9 8 fL      Platelets 978 Thousands/uL      nRBC 0 /100 WBCs      Neutrophils Relative 69 %      Immat GRANS % 0 %      Lymphocytes Relative 20 %      Monocytes Relative 9 %      Eosinophils Relative 1 %      Basophils Relative 1 %      Neutrophils Absolute 4 62 Thousands/µL      Immature Grans Absolute 0 01 Thousand/uL      Lymphocytes Absolute 1 35 Thousands/µL      Monocytes Absolute 0 58 Thousand/µL      Eosinophils Absolute 0 08 Thousand/µL      Basophils Absolute 0 04 Thousands/µL     UA w Reflex to Microscopic w Reflex to Culture [[id number]]  (Normal) Collected: 06/26/22 1144    Lab Status: Final result Specimen: Urine, Clean Catch Updated: 06/26/22 1152     Color, UA Yellow     Clarity, UA Clear     Specific Gravity, UA 1 010     pH, UA 6 0 Leukocytes, UA Negative     Nitrite, UA Negative     Protein, UA Negative mg/dl      Glucose, UA Negative mg/dl      Ketones, UA Negative mg/dl      Urobilinogen, UA 0 2 E U /dl      Bilirubin, UA Negative     Occult Blood, UA Negative                 CT abdomen pelvis wo contrast   Final Result by Jose Alejandro Richter DO (06/26 1248)   1   3 x 4 mm calcification in the right hemipelvis representing either phlebolith or nonobstructing distal right ureteric calculus  Consider follow-up urology consultation  If there is continued concern for nonobstructing distal ureteric calculus,    consider follow-up CT urogram       2   Mild hyperemia of the perirectal soft tissues  Correlate for mild proctitis  If warranted, consider follow-up GI and/or surgical consultation  3   Colonic diverticulosis  4   Cholelithiasis without CT evidence of acute cholecystitis  If there is continued concern for acute cholecystitis, consider follow-up surgical and/or GI consultation  Nuclear medicine HIDA scan suggested to exclude cystic duct occlusion  The study was marked in [facility] for immediate notification  Workstation performed: [id number]                    Procedures  Procedures         ED Course  ED Course as of 06/26/22 Martha Red Jun 26, 2022   1156 Patient declining medication for symptoms   1251 Ct results: 1   3 x 4 mm calcification in the right hemipelvis representing either phlebolith or nonobstructing distal right ureteric calculus  Consider follow-up urology consultation  If there is continued concern for nonobstructing distal ureteric calculus,   consider follow-up CT urogram      2   Mild hyperemia of the perirectal soft tissues  Correlate for mild proctitis  If warranted, consider follow-up GI and/or surgical consultation      3  Colonic diverticulosis      4  Cholelithiasis without CT evidence of acute cholecystitis    If there is continued concern for acute cholecystitis, consider follow-up surgical and/or GI consultation  Nuclear medicine HIDA scan suggested to exclude cystic duct occlusion  Niall Almaraz out to Urology via [employer]  Dr Spencer Ramey: Yes, pain control, Tamsulosin, followup ct renal protocol in 2 weeks                                             MDM  Number of Diagnoses or Management Options  Right ureteral stone: new and requires workup  Diagnosis management comments: 56 yo previously healthy female presenting to the ED for evaluation of suprapubic abdominal pain and burning x 10 hours  Unremarkable physical exam, however patient rates her pain 8/10  Vitals are stable  Patient is afebrile  She is declining medication at this time  DDx: cystitis vs kidney stone vs pyelo  Workup includes CT abd/pelvis, lipase, CBC, UA, CMP  Labwork unremarkable  No UTI noted on UA  CT abd/pelvis showed multiple nonemergent findings, but a small 3x4mm nonobstructing distal ureteral stone that can explain her symptoms  Discussed with Dr Spencer Ramey in urology who recommend tamsulosin, pain control, urine strainer, and urology followup  I discussed these findings with the patient and spouse who verbalize understanding of the assessment and plan and are amenable to discharge  Gave instructions on straining urine  I sent scripts of oxy, tamsulosin, and zofran to patient's pharmacy  Gave referrals for general surgery for cholelithiasis  Also advised her to follow up with GI for her diverticulosis  Strict return precautions discussed  Patient was discharged in stable condition          Amount and/or Complexity of Data Reviewed  Clinical lab tests: ordered and reviewed  Tests in the radiology section of CPT®: ordered and reviewed  Obtain history from someone other than the patient: yes (spouse)  Discuss the patient with other providers: yes  Independent visualization of images, tracings, or specimens: yes    Patient Progress  Patient progress: stable      Disposition  Final diagnoses:   Right ureteral stone Time reflects when diagnosis was documented in both MDM as applicable and the Disposition within this note     Time User Action Codes Description Comment    6/26/2022  1:28 PM Toñito Boston Add [N20 1] Right ureteral stone       ED Disposition     ED Disposition   Discharge    Condition   Stable    Date/Time   Sun Jun 26, 2022  1:28 PM    Comment   Allen Curiel Cutting discharge to home/self care                 Follow-up Information     Follow up With Specialties Details Why Contact Info Additional [address], DO Family Medicine Schedule an appointment as soon as possible for a visit in 1 week follow up for further evaluation of symptoms [address]  [facility] Emergency Department Emergency Medicine Go to  If symptoms worsen [address] [place] [address]  [facility] Emergency Department, [address] [place] [address], [address] [facility], [address] [facility]    Zelda Warren MD Urology Schedule an appointment as soon as possible for a visit in 2 weeks follow up for further evaluation of symptoms [address] [facility] [address] [facility] Surgery Call in 1 week follow up for further evaluation of cholelithiasis [address]  [facility] [address]  [address] [facility], [address] [place], [place], [address] [phone]          Discharge Medication List as of 6/26/2022  1:38 PM      START taking these medications    Details   ondansetron (Zofran ODT) 4 mg disintegrating tablet Take 1 tablet (4 mg total) by mouth every 6 (six) hours as needed for nausea or vomiting, Starting Sun 6/26/2022, Normal      oxyCODONE (Roxicodone) 5 immediate release tablet Take 1 tablet (5 mg total) by mouth every 4 (four) hours as needed for moderate pain or severe pain for up to 10 days Max Daily Amount: 30 mg, Starting Sun 6/26/2022, Until Wed 7/6/2022 at 2359, Normal      tamsulosin (FLOMAX) 0 4 mg Take 1 capsule (0 4 mg total) by mouth daily with dinner for 10 days, Starting Sun 6/26/2022, Until Wed 7/6/2022, Normal         CONTINUE these medications which have NOT CHANGED    Details   amLODIPine (NORVASC) 5 mg tablet Take 5 mg by mouth daily, Historical Med      aspirin 81 mg chewable tablet Chew 1 tablet (81 mg total) daily, Starting Mon 1/11/2021, Until Mon 5/24/2021, Normal      cyanocobalamin (VITAMIN B-12) 1000 MCG tablet Take 1,000 mcg by mouth as needed , Historical Med      famotidine (PEPCID) 40 MG tablet Take 40 mg by mouth daily, Starting Tue 12/10/2019, Historical Med      metoprolol succinate (TOPROL-XL) 50 mg 24 hr tablet Take 25 mg by mouth daily , Starting Tue 12/10/2019, Historical Med      Multiple Vitamins-Minerals (MULTIVITAMIN ADULTS 50+ PO) Take 1 tablet by mouth daily -takes beet root and green vegg supplement, Historical Med      Multiple Vitamins-Minerals (ZINC PO) Take 1 tablet by mouth as needed , Historical Med      rosuvastatin (CRESTOR) 5 mg tablet TAKE 1 TABLET BY MOUTH EVERY DAY, Normal             No discharge procedures on file      PDMP Review     None          ED Provider  Electronically Signed by           Hany Kim PA-C  06/26/22 7467

## 2022-06-26 NOTE — DISCHARGE INSTRUCTIONS
Please follow up with urology for further evaluation of your symptoms  Please strain your urine to attempt to collect the stone for analysis  Start taking oxycodone, tamsulosin, and zofran for symptomatic relief  You may also take tylenol and motrin for If you develop any significant changes or worsening condition, please return to the emergency department

## 2022-07-14 ENCOUNTER — LAB REQUISITION (OUTPATIENT)
Dept: LAB | Facility: HOSPITAL | Age: 62
End: 2022-07-14
Payer: COMMERCIAL

## 2022-07-14 DIAGNOSIS — R31.29 OTHER MICROSCOPIC HEMATURIA: ICD-10-CM

## 2022-07-14 LAB
BACTERIA UR QL AUTO: ABNORMAL /HPF
BILIRUB UR QL STRIP: NEGATIVE
CLARITY UR: ABNORMAL
COLOR UR: ABNORMAL
GLUCOSE UR STRIP-MCNC: NEGATIVE MG/DL
HGB UR QL STRIP.AUTO: NEGATIVE
KETONES UR STRIP-MCNC: NEGATIVE MG/DL
LEUKOCYTE ESTERASE UR QL STRIP: ABNORMAL
MUCOUS THREADS UR QL AUTO: ABNORMAL
NITRITE UR QL STRIP: NEGATIVE
NON-SQ EPI CELLS URNS QL MICRO: ABNORMAL /HPF
PH UR STRIP.AUTO: 5 [PH]
PROT UR STRIP-MCNC: NEGATIVE MG/DL
RBC #/AREA URNS AUTO: ABNORMAL /HPF
SP GR UR STRIP.AUTO: 1.02 (ref 1–1.03)
URATE CRY URNS QL MICRO: ABNORMAL /HPF
UROBILINOGEN UR STRIP-ACNC: <2 MG/DL
WBC #/AREA URNS AUTO: ABNORMAL /HPF

## 2022-07-14 PROCEDURE — 81001 URINALYSIS AUTO W/SCOPE: CPT | Performed by: UROLOGY

## 2022-07-18 ENCOUNTER — OFFICE VISIT (OUTPATIENT)
Dept: SURGERY | Facility: CLINIC | Age: 62
End: 2022-07-18
Payer: COMMERCIAL

## 2022-07-18 VITALS
RESPIRATION RATE: 18 BRPM | SYSTOLIC BLOOD PRESSURE: 132 MMHG | DIASTOLIC BLOOD PRESSURE: 78 MMHG | WEIGHT: 183.2 LBS | BODY MASS INDEX: 32.46 KG/M2 | HEIGHT: 63 IN | HEART RATE: 86 BPM | TEMPERATURE: 96.8 F

## 2022-07-18 DIAGNOSIS — K80.10 CALCULUS OF GALLBLADDER WITH CHRONIC CHOLECYSTITIS WITHOUT OBSTRUCTION: Primary | ICD-10-CM

## 2022-07-18 DIAGNOSIS — I67.1 INTRACRANIAL ANEURYSM: ICD-10-CM

## 2022-07-18 PROCEDURE — 99203 OFFICE O/P NEW LOW 30 MIN: CPT | Performed by: SPECIALIST

## 2022-07-18 RX ORDER — CEFAZOLIN SODIUM 2 G/50ML
2000 SOLUTION INTRAVENOUS ONCE
Status: CANCELLED | OUTPATIENT
Start: 2022-08-12 | End: 2022-07-18

## 2022-07-18 RX ORDER — SODIUM CHLORIDE, SODIUM LACTATE, POTASSIUM CHLORIDE, CALCIUM CHLORIDE 600; 310; 30; 20 MG/100ML; MG/100ML; MG/100ML; MG/100ML
125 INJECTION, SOLUTION INTRAVENOUS CONTINUOUS
Status: CANCELLED | OUTPATIENT
Start: 2022-07-18

## 2022-07-18 NOTE — H&P (VIEW-ONLY)
Assessment/Plan:    Calculus of gallbladder with chronic cholecystitis without obstruction  The patient was seen in the emergency room 3 weeks prior for a right ureteral calculus, which she ultimately passed  However, at that time a calcified gallstone was also documented, and the patient is having ongoing symptoms of postprandial nausea and pain consistent with chronic cholecystitis  The patient seeks to proceed with laparoscopic cholecystectomy, and this is tentatively planned for Friday 08/12/2022  She carries a diagnosis of an intracranial aneurysm, which is apparently an operable  And after discussion between Anesthesia and Neurosurgery, she will be sent to the surgery optimization clinic, and to ensure her blood pressure is well managed prior to surgery  Diagnoses and all orders for this visit:    Calculus of gallbladder with chronic cholecystitis without obstruction  -     Case request operating room: CHOLECYSTECTOMY LAPAROSCOPIC; Standing  -     Case request operating room: [place] [id number]  -     Ambulatory Referral to Surgical Optimization; Future    Intracranial aneurysm  -     Ambulatory Referral to Surgical Optimization; Future    Other orders  -     Diet NPO; Sips with meds; Standing  -     Apply Sequential Compression Device; Standing  -     Place sequential compression device; Standing  -     Electrocardiogram, 12-lead; Standing  -     lactated ringers infusion  -     ceFAZolin (ANCEF) 2,000 mg in dextrose 5 % 100 mL IVPB          Subjective:      Patient ID: Reese Yoon is a 57 y o  female  The patient is a pleasant 26-year-old white female who presents for evaluations a symptoms of right upper quadrant pain following meals, and tenderness in the right upper quadrant  The patient was evaluated in the emergency room 3 weeks ago, for renal colic, and ultimately passed a small right ureteral stone    However, the CT scan also demonstrated a calcified gallstone impacted in the neck of the gallbladder, and the patient is having symptoms of right upper quadrant pain related to meals  The patient presents to discuss proceeding with laparoscopic cholecystectomy  Return I have discussed this with Anesthesia, she does have a known intracranial aneurysm, which is apparently inoperable  There was further discussion between Anesthesia and Neurosurgery and it appears reasonable to proceed with laparoscopic cholecystectomy  The patient will be referred to to the surgical optimization clinic and neurosurgery recommendations will be acted upon prior to laparoscopic cholecystectomy, which is tentatively scheduled for August 12, 2022  The following portions of the patient's history were reviewed and updated as appropriate: allergies, current medications, past family history, past medical history, past social history, past surgical history and problem list     Review of Systems   Constitutional: Negative for chills and fever  HENT: Negative for trouble swallowing  Respiratory: Negative for cough and shortness of breath  Cardiovascular: Negative for chest pain and palpitations  Gastrointestinal: Positive for abdominal distention, abdominal pain and nausea  Negative for constipation, diarrhea and vomiting  Genitourinary: Negative  Musculoskeletal: Negative  Skin: Negative for color change  Neurological: Negative for seizures, weakness and headaches  Psychiatric/Behavioral: The patient is not nervous/anxious  Objective:      /78 (BP Location: Left arm, Patient Position: Sitting, Cuff Size: Standard)   Pulse 86   Temp (!) 96 8 °F (36 °C) (Temporal)   Resp 18   Ht 5' 3" (1 6 m)   Wt 83 1 kg (183 lb 3 2 oz)   BMI 32 45 kg/m²          Physical Exam  Constitutional:       Appearance: Normal appearance  HENT:      Head: Normocephalic  Eyes:      General: No scleral icterus  Pupils: Pupils are equal, round, and reactive to light  Cardiovascular:      Rate and Rhythm: Normal rate and regular rhythm  Heart sounds: Normal heart sounds  No murmur heard  Pulmonary:      Effort: Pulmonary effort is normal       Breath sounds: Normal breath sounds  Abdominal:      General: There is no distension  Palpations: Abdomen is soft  There is no mass  Tenderness: There is abdominal tenderness (mild tenderness RUQ to palpation)  There is no guarding or rebound  Genitourinary:     Comments: deferred  Musculoskeletal:         General: No swelling  Skin:     General: Skin is warm and dry  Neurological:      General: No focal deficit present  Mental Status: She is alert and oriented to person, place, and time     Psychiatric:         Mood and Affect: Mood normal

## 2022-07-18 NOTE — PROGRESS NOTES
Assessment/Plan:    Calculus of gallbladder with chronic cholecystitis without obstruction  The patient was seen in the emergency room 3 weeks prior for a right ureteral calculus, which she ultimately passed  However, at that time a calcified gallstone was also documented, and the patient is having ongoing symptoms of postprandial nausea and pain consistent with chronic cholecystitis  The patient seeks to proceed with laparoscopic cholecystectomy, and this is tentatively planned for Friday 08/12/2022  She carries a diagnosis of an intracranial aneurysm, which is apparently an operable  And after discussion between Anesthesia and Neurosurgery, she will be sent to the surgery optimization clinic, and to ensure her blood pressure is well managed prior to surgery  Diagnoses and all orders for this visit:    Calculus of gallbladder with chronic cholecystitis without obstruction  -     Case request operating room: CHOLECYSTECTOMY LAPAROSCOPIC; Standing  -     Case request operating room: [place] [id number]  -     Ambulatory Referral to Surgical Optimization; Future    Intracranial aneurysm  -     Ambulatory Referral to Surgical Optimization; Future    Other orders  -     Diet NPO; Sips with meds; Standing  -     Apply Sequential Compression Device; Standing  -     Place sequential compression device; Standing  -     Electrocardiogram, 12-lead; Standing  -     lactated ringers infusion  -     ceFAZolin (ANCEF) 2,000 mg in dextrose 5 % 100 mL IVPB          Subjective:      Patient ID: Tre Mcintyre is a 57 y o  female  The patient is a pleasant 38-year-old white female who presents for evaluations a symptoms of right upper quadrant pain following meals, and tenderness in the right upper quadrant  The patient was evaluated in the emergency room 3 weeks ago, for renal colic, and ultimately passed a small right ureteral stone    However, the CT scan also demonstrated a calcified gallstone impacted in the neck of the gallbladder, and the patient is having symptoms of right upper quadrant pain related to meals  The patient presents to discuss proceeding with laparoscopic cholecystectomy  Return I have discussed this with Anesthesia, she does have a known intracranial aneurysm, which is apparently inoperable  There was further discussion between Anesthesia and Neurosurgery and it appears reasonable to proceed with laparoscopic cholecystectomy  The patient will be referred to to the surgical optimization clinic and neurosurgery recommendations will be acted upon prior to laparoscopic cholecystectomy, which is tentatively scheduled for August 12, 2022  The following portions of the patient's history were reviewed and updated as appropriate: allergies, current medications, past family history, past medical history, past social history, past surgical history and problem list     Review of Systems   Constitutional: Negative for chills and fever  HENT: Negative for trouble swallowing  Respiratory: Negative for cough and shortness of breath  Cardiovascular: Negative for chest pain and palpitations  Gastrointestinal: Positive for abdominal distention, abdominal pain and nausea  Negative for constipation, diarrhea and vomiting  Genitourinary: Negative  Musculoskeletal: Negative  Skin: Negative for color change  Neurological: Negative for seizures, weakness and headaches  Psychiatric/Behavioral: The patient is not nervous/anxious  Objective:      /78 (BP Location: Left arm, Patient Position: Sitting, Cuff Size: Standard)   Pulse 86   Temp (!) 96 8 °F (36 °C) (Temporal)   Resp 18   Ht 5' 3" (1 6 m)   Wt 83 1 kg (183 lb 3 2 oz)   BMI 32 45 kg/m²          Physical Exam  Constitutional:       Appearance: Normal appearance  HENT:      Head: Normocephalic  Eyes:      General: No scleral icterus  Pupils: Pupils are equal, round, and reactive to light  Cardiovascular:      Rate and Rhythm: Normal rate and regular rhythm  Heart sounds: Normal heart sounds  No murmur heard  Pulmonary:      Effort: Pulmonary effort is normal       Breath sounds: Normal breath sounds  Abdominal:      General: There is no distension  Palpations: Abdomen is soft  There is no mass  Tenderness: There is abdominal tenderness (mild tenderness RUQ to palpation)  There is no guarding or rebound  Genitourinary:     Comments: deferred  Musculoskeletal:         General: No swelling  Skin:     General: Skin is warm and dry  Neurological:      General: No focal deficit present  Mental Status: She is alert and oriented to person, place, and time     Psychiatric:         Mood and Affect: Mood normal

## 2022-07-18 NOTE — ASSESSMENT & PLAN NOTE
The patient was seen in the emergency room 3 weeks prior for a right ureteral calculus, which she ultimately passed  However, at that time a calcified gallstone was also documented, and the patient is having ongoing symptoms of postprandial nausea and pain consistent with chronic cholecystitis  The patient seeks to proceed with laparoscopic cholecystectomy, and this is tentatively planned for Friday 08/12/2022  She carries a diagnosis of an intracranial aneurysm, which is apparently an operable  And after discussion between Anesthesia and Neurosurgery, she will be sent to the surgery optimization clinic, and to ensure her blood pressure is well managed prior to surgery

## 2022-07-18 NOTE — PATIENT INSTRUCTIONS
Pre-Surgery Instructions:   Medication Instructions    amLODIPine (NORVASC) 5 mg tablet Take morning of surgery    aspirin (ECOTRIN LOW STRENGTH) 81 mg EC tablet per anesthesia guidelines     cyanocobalamin (VITAMIN B-12) 1000 MCG tablet per anesthesia guidelines     metoprolol succinate (TOPROL-XL) 50 mg 24 hr tablet Take morning of surgery    Multiple Vitamins-Minerals (MULTIVITAMIN ADULTS 50+ PO) per anesthesia guidelines       You are tentatively scheduled for Laparoscopic Cholecystectomy on Friday August 12th  The hospital will call the evening prior and tell you what time to arrive  Please shower with Hibiclens the evening prior and the morning of surgery  Nothing to eat or drink after midnight the evening prior except your blood pressure medications in the morning with a sip of water  The anesthesiologist is reviewing your medical history, and may recommend that you have surgery at [place] if he feels that the risk would be too high to be done at [place]  Cholecystitis   WHAT YOU NEED TO KNOW:   What is cholecystitis? Cholecystitis is inflammation of your gallbladder  Your gallbladder stores bile, which helps break down the fat that you eat  Your gallbladder also helps remove certain chemicals from your body  You may have a sudden, severe attack (acute cholecystitis) or several mild attacks (chronic cholecystitis)  What causes cholecystitis? Gallstones    Gallbladder damage, such as from recent surgery or severe illness    A bacterial infection    Narrowing of the bile duct (bile flows from your gallbladder to your intestine through the bile duct)    One or more tumors    What increases my risk for cholecystitis? Medicines, such as birth control pills or blood pressure medicines    Obesity    Pregnancy    Rapid weight loss    What are the signs and symptoms of cholecystitis?    Pain in your abdomen, often after you eat a big meal with fatty foods    Lump on the right side of your abdomen    Decreased appetite     Fever or chills    Nausea or vomiting    Yellowing of your skin and the whites of your eyes    How is cholecystitis diagnosed? Your healthcare provider will ask you about your signs and symptoms  He will also check your abdomen to find out where it hurts  You may also need any of the following:  Blood and urine tests  may show what is causing your symptoms  An ultrasound or CT  may show your gallbladder and if you have one or more gallstones  You may be given contrast liquid to help the gallbladder show up better in the pictures  Tell the healthcare provider if you have ever had an allergic reaction to contrast liquid  How is cholecystitis treated? Your treatment may depend on whether your cholecystitis is mild, moderate, or severe  Medicines:      Antibiotics  treat a bacterial infection  Prescription pain medicine  may be given  Ask your healthcare provider how to take this medicine safely  Some prescription pain medicines contain acetaminophen  Do not take other medicines that contain acetaminophen without talking to your healthcare provider  Too much acetaminophen may cause liver damage  Prescription pain medicine may cause constipation  Ask your healthcare provider how to prevent or treat constipation  NSAIDs , such as ibuprofen, help decrease swelling, pain, and fever  This medicine is available with or without a doctor's order  NSAIDs can cause stomach bleeding or kidney problems in certain people  If you take blood thinner medicine, always ask your healthcare provider if NSAIDs are safe for you  Always read the medicine label and follow directions  Cholecystostomy  is a procedure to drain your gallbladder  A hollow needle will be put into your gallbladder through your abdomen  The bile in your gallbladder will be drained through this needle  You may also have a tube inserted in your gallbladder to drain it over several days or weeks       Cholecystectomy is surgery to remove your gallbladder  Call 911 for any of the following: You have chest pain or trouble breathing  When should I seek immediate care? You have severe pain in your abdomen  You urinate less than usual     When should I contact my healthcare provider? You have a fever or chills  You have pain when you urinate  Your skin or eyes turn yellow  You have questions or concerns about your condition or care  CARE AGREEMENT:   You have the right to help plan your care  Learn about your health condition and how it may be treated  Discuss treatment options with your healthcare providers to decide what care you want to receive  You always have the right to refuse treatment  The above information is an  only  It is not intended as medical advice for individual conditions or treatments  Talk to your doctor, nurse or pharmacist before following any medical regimen to see if it is safe and effective for you  © Copyright [employer] 2022 Information is for End User's use only and may not be sold, redistributed or otherwise used for commercial purposes   All illustrations and images included in CareNotes® are the copyrighted property of [employer]  or [address] [place] [address]

## 2022-07-19 ENCOUNTER — TELEPHONE (OUTPATIENT)
Dept: ANESTHESIOLOGY | Facility: CLINIC | Age: 62
End: 2022-07-19

## 2022-07-26 ENCOUNTER — CONSULT (OUTPATIENT)
Dept: ANESTHESIOLOGY | Facility: CLINIC | Age: 62
End: 2022-07-26
Payer: COMMERCIAL

## 2022-07-26 VITALS
HEART RATE: 61 BPM | BODY MASS INDEX: 32.77 KG/M2 | WEIGHT: 185 LBS | SYSTOLIC BLOOD PRESSURE: 143 MMHG | TEMPERATURE: 96.4 F | OXYGEN SATURATION: 98 % | DIASTOLIC BLOOD PRESSURE: 79 MMHG

## 2022-07-26 DIAGNOSIS — I67.1 INTRACRANIAL ANEURYSM: ICD-10-CM

## 2022-07-26 DIAGNOSIS — K80.10 CALCULUS OF GALLBLADDER WITH CHRONIC CHOLECYSTITIS WITHOUT OBSTRUCTION: ICD-10-CM

## 2022-07-26 DIAGNOSIS — Z01.818 PRE-OP TESTING: ICD-10-CM

## 2022-07-26 DIAGNOSIS — I10 ESSENTIAL HYPERTENSION: Primary | ICD-10-CM

## 2022-07-26 PROCEDURE — 99215 OFFICE O/P EST HI 40 MIN: CPT | Performed by: NURSE PRACTITIONER

## 2022-07-26 NOTE — PRE-PROCEDURE INSTRUCTIONS
Pre-Surgery Instructions:   Medication Instructions    amLODIPine (NORVASC) 5 mg tablet Take day of surgery   aspirin (ECOTRIN LOW STRENGTH) 81 mg EC tablet Stop taking 7 days prior to surgery   cyanocobalamin (VITAMIN B-12) 1000 MCG tablet Stop taking 7 days prior to surgery   metoprolol succinate (TOPROL-XL) 50 mg 24 hr tablet Take day of surgery   Multiple Vitamins-Minerals (MULTIVITAMIN ADULTS 50+ PO) Stop taking 7 days prior to surgery   Nutritional Supplements (JUICE PLUS FIBRE PO) Stop taking 7 days prior to surgery  Have you had / have a sore throat? No  Have you had / have a cough less than 1 week? No  Have you had / have a fever greater than 100 0 - 100  4? No  Are you experiencing any shortness of breath? No  Review with patient and her  in person [facility] clinic medications and showering instructions  Instructed to avoid all ASA and OTC Vit/Supp 1 week prior to surgery and to avoid NSAIDs 3 days prior to surgery per anesthesia instructions  Tylenol ok to take prn  [facility] call with surgery schedule time, nothing eat or drink after midnight

## 2022-07-26 NOTE — PROGRESS NOTES
[facility]  Consult:  HIGH RISK       Assessment/Plan:  · 57-year-old female referred to [facility] for pre-surgery surgical optimization  · CONSULT CONCERNS :  History of intracranial aneurysm,  And after discussion between Anesthesia and Neurosurgery, she will be sent to the surgery optimization clinic, and to ensure her blood pressure is well managed prior to surgery  · She is scheduled on 08/12/2022  Case: [id number] Date/Time: 08/12/22 1325   Procedure: CHOLECYSTECTOMY LAPAROSCOPIC (N/A Abdomen)   Anesthesia type: General   Diagnosis: Calculus of gallbladder with chronic cholecystitis without obstruction [K80 10]   Pre-op diagnosis: Calculus of gallbladder with chronic cholecystitis without obstruction [K80 10]   [facility]: [facility]   Surgeons: Eric Valle MD   No problem-specific Assessment & Plan notes found for this encounter  Problem List Items Addressed This Visit        Digestive    Calculus of gallbladder with chronic cholecystitis without obstruction  · Seen today for surgical optimization  · EKG done today- await results  · Blood work reviewed from June 2022 -stable  · BP is 143/79 goal   · No problems with daily BP per patient   · Requesting neurology clearance- message sent to Dr Alcocer Life today       Cardiovascular and Mediastinum    Essential hypertension - Primary  · Stable per patient  · BP on exam 143/79  · Continue current regimen metoprolol & amlodipine-continue pre and post surgery  · Keep blood pressure log  · Goal  OR LESS       Intracranial aneurysm  · DIAGNOSED 12 YEARS AGO  · Denies any illness related symptoms  · FOLLOWS WITH DR IQBAL, Patient electing no treatment  · Will request Neurology clearance- message sent today          As always we discussed having your BEST surgery, and BEST recovery  Surgery goals reviewed today  Breathing exercises   Patient was encouraged to begin lung exercises today    This could be accomplished through deep breathing and cough exercises  Patient was taught how to use an incentive spirometer  Return demonstration provided  Eating/nutrition   Encouraged patient to increase oral protein intake prior to surgery  Based on current weight of   , patient was instructed to consume    Grams of protein a day  This can be accomplished by consuming chicken, fish, tuna fish, cottage cheese, cheese, eggs, [place] yogurt, and protein shakes as needed  I encouraged use of protein shakes such ENLIVE  I also recommended making your own protein shakes with protein powder  Sleep/Stress management  Patient was encouraged to rest their body prior to surgery  Encouraged attempting to get 8 hours of sleep at night  Avoid stress  Avoid sick contacts  Encouraged to find a relaxing hobby such as reading, meditation, listening to music  Training exercises  Patient was encouraged to remain active as possible  Today bilateral lower extremity generic exercises were taught for muscle strengthening and balance  All exercises to be done sitting down  Subjective: The patient was seen in the emergency room 3 weeks prior for a right ureteral calculus, which she ultimately passed  However, at that time a calcified gallstone was also documented, and the patient is having ongoing symptoms of postprandial nausea and pain consistent with chronic cholecystitis      The patient seeks to proceed with laparoscopic cholecystectomy, and this is tentatively planned for Friday 08/12/2022  Patient ID: Bubba Linn is a 57 y o  female referred to [facility] for pre-surgery surgical optimization  She is electing to have her gallbladder removed  We are seeing her today for surgery prep, and blood pressure review  I met patient in the [facility]  She arrived with her   She walks independently  Gait steady  No falls  She lives at home with her   She is been  20+ years    She is independent with all ADLs  She is active  Her Mets is 9  Denies chest pain denies shortness of breath  She is a  and 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  I reviewed some blood work from June 2022 which was stable  She has a history of an intracranial aneurysm  This was diagnosed 12 years ago  She was being followed by Dr Carlyn Wright  Per last office note of Neurology:  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 and reconstitution through left thyrocervical trunk and occipital artery  S/P  attempted embolization of aneurysm on 05/14/2021, UNSUCCESSFUL  Plan at that time included continued observation, which would be 1 year with a CTA  Surgical resection of craniotomy and clipping of the aneurysm, As well as flow diversion/decreasing flow through embolization of the occipital artery  Goal of blood pressure systolic less than 429  In discussion with patient today she states she never had her follow-up CTA which was recommended TO BE DONE IN May 2022  She decided not to have the scan,  as she would not be having any intervention done on the intracranial aneurysm  so she does not want to know  I discussed this with Anesthesia  And I will be sending a message to neurologist Dr Carlyn Wright to see if he is okay with patient moving forward to have her gallbladder removed  As always we discussed having your BEST surgery, and BEST recovery  Surgery goals reviewed today  Breathing exercises   Patient was encouraged to begin lung exercises today  This could be accomplished through deep breathing and cough exercises  Patient was taught how to use an incentive spirometer  Return demonstration provided  Eating/nutrition   Encouraged patient to increase oral protein intake prior to surgery  This can be accomplished by consuming chicken, fish, tuna fish, cottage cheese, cheese, eggs, [address] yogurt, and protein shakes as needed    I encouraged use of protein shakes such ENLIVE  I also recommended making your own protein shakes with protein powder  Sleep/Stress management  Patient was encouraged to rest their body prior to surgery  Encouraged attempting to get 8 hours of sleep at night  Avoid stress  Avoid sick contacts  Encouraged to find a relaxing hobby such as reading, meditation, listening to music  Training exercises  Patient was encouraged to remain active as possible  Today bilateral lower extremity generic exercises were taught for muscle strengthening and balance  All exercises to be done sitting down  The following portions of the patient's history were reviewed and updated as appropriate: past family history, past medical history, past social history, past surgical history and problem list     Review of Systems   Constitutional: Negative for fever  HENT: Negative for sinus pain and sore throat  Respiratory: Negative for apnea, cough and choking  Cardiovascular: Negative for chest pain, palpitations and leg swelling  Gastrointestinal: Negative for diarrhea, nausea and vomiting  Genitourinary: Negative for difficulty urinating  Skin: Negative for color change, pallor, rash and wound  Neurological: Negative for dizziness, seizures and light-headedness  Psychiatric/Behavioral: Negative for agitation, behavioral problems and confusion  Objective:      /79 (BP Location: Left arm, Patient Position: Sitting, Cuff Size: Standard)   Pulse 61   Temp (!) 96 4 °F (35 8 °C) (Tympanic)   Wt 83 9 kg (185 lb)   SpO2 98%   BMI 32 77 kg/m²          Physical Exam  Constitutional:       Appearance: Normal appearance  HENT:      Head: Normocephalic  Nose: Nose normal       Mouth/Throat:      Mouth: Mucous membranes are moist    Eyes:      Pupils: Pupils are equal, round, and reactive to light  Cardiovascular:      Rate and Rhythm: Normal rate  Pulses: Normal pulses     Pulmonary:      Effort: Pulmonary effort is normal    Abdominal:      Palpations: Abdomen is soft  Musculoskeletal:         General: Normal range of motion  Skin:     General: Skin is warm and dry  Neurological:      General: No focal deficit present  Mental Status: She is alert and oriented to person, place, and time  Mental status is at baseline  Psychiatric:         Mood and Affect: Mood normal          Behavior: Behavior normal          Thought Content:  Thought content normal          Judgment: Judgment normal

## 2022-08-12 ENCOUNTER — HOSPITAL ENCOUNTER (OUTPATIENT)
Facility: HOSPITAL | Age: 62
Setting detail: OUTPATIENT SURGERY
Discharge: HOME | End: 2022-08-12
Attending: SPECIALIST | Admitting: SPECIALIST
Payer: COMMERCIAL

## 2022-08-12 ENCOUNTER — ANESTHESIA (OUTPATIENT)
Dept: PERIOP | Facility: HOSPITAL | Age: 62
End: 2022-08-12
Payer: COMMERCIAL

## 2022-08-12 ENCOUNTER — ANESTHESIA EVENT (OUTPATIENT)
Dept: PERIOP | Facility: HOSPITAL | Age: 62
End: 2022-08-12
Payer: COMMERCIAL

## 2022-08-12 VITALS
DIASTOLIC BLOOD PRESSURE: 74 MMHG | RESPIRATION RATE: 20 BRPM | BODY MASS INDEX: 32.43 KG/M2 | HEART RATE: 64 BPM | OXYGEN SATURATION: 97 % | HEIGHT: 63 IN | SYSTOLIC BLOOD PRESSURE: 141 MMHG | TEMPERATURE: 97.6 F | WEIGHT: 183 LBS

## 2022-08-12 DIAGNOSIS — K80.10 CALCULUS OF GALLBLADDER WITH CHRONIC CHOLECYSTITIS WITHOUT OBSTRUCTION: ICD-10-CM

## 2022-08-12 LAB
ATRIAL RATE: 65 BPM
P AXIS: 26 DEGREES
PR INTERVAL: 154 MS
QRS AXIS: 9 DEGREES
QRSD INTERVAL: 84 MS
QT INTERVAL: 420 MS
QTC INTERVAL: 436 MS
T WAVE AXIS: 36 DEGREES
VENTRICULAR RATE: 65 BPM

## 2022-08-12 PROCEDURE — 47562 LAPAROSCOPIC CHOLECYSTECTOMY: CPT | Performed by: SPECIALIST

## 2022-08-12 PROCEDURE — 93010 ELECTROCARDIOGRAM REPORT: CPT | Performed by: INTERNAL MEDICINE

## 2022-08-12 PROCEDURE — 88304 TISSUE EXAM BY PATHOLOGIST: CPT | Performed by: PATHOLOGY

## 2022-08-12 PROCEDURE — 47562 LAPAROSCOPIC CHOLECYSTECTOMY: CPT | Performed by: PHYSICIAN ASSISTANT

## 2022-08-12 PROCEDURE — 93005 ELECTROCARDIOGRAM TRACING: CPT

## 2022-08-12 RX ORDER — SODIUM CHLORIDE, SODIUM LACTATE, POTASSIUM CHLORIDE, CALCIUM CHLORIDE 600; 310; 30; 20 MG/100ML; MG/100ML; MG/100ML; MG/100ML
125 INJECTION, SOLUTION INTRAVENOUS CONTINUOUS
Status: DISCONTINUED | OUTPATIENT
Start: 2022-08-12 | End: 2022-08-12

## 2022-08-12 RX ORDER — CEFAZOLIN SODIUM 2 G/50ML
2000 SOLUTION INTRAVENOUS ONCE
Status: COMPLETED | OUTPATIENT
Start: 2022-08-12 | End: 2022-08-12

## 2022-08-12 RX ORDER — FENTANYL CITRATE/PF 50 MCG/ML
50 SYRINGE (ML) INJECTION
Status: DISCONTINUED | OUTPATIENT
Start: 2022-08-12 | End: 2022-08-12 | Stop reason: HOSPADM

## 2022-08-12 RX ORDER — ONDANSETRON 2 MG/ML
4 INJECTION INTRAMUSCULAR; INTRAVENOUS ONCE
Status: DISCONTINUED | OUTPATIENT
Start: 2022-08-12 | End: 2022-08-12 | Stop reason: HOSPADM

## 2022-08-12 RX ORDER — DEXAMETHASONE SODIUM PHOSPHATE 10 MG/ML
INJECTION, SOLUTION INTRAMUSCULAR; INTRAVENOUS AS NEEDED
Status: DISCONTINUED | OUTPATIENT
Start: 2022-08-12 | End: 2022-08-12

## 2022-08-12 RX ORDER — PROPOFOL 10 MG/ML
INJECTION, EMULSION INTRAVENOUS AS NEEDED
Status: DISCONTINUED | OUTPATIENT
Start: 2022-08-12 | End: 2022-08-12

## 2022-08-12 RX ORDER — OXYCODONE HYDROCHLORIDE AND ACETAMINOPHEN 5; 325 MG/1; MG/1
1 TABLET ORAL EVERY 4 HOURS PRN
Qty: 12 TABLET | Refills: 0 | Status: SHIPPED | OUTPATIENT
Start: 2022-08-12

## 2022-08-12 RX ORDER — ONDANSETRON 2 MG/ML
INJECTION INTRAMUSCULAR; INTRAVENOUS AS NEEDED
Status: DISCONTINUED | OUTPATIENT
Start: 2022-08-12 | End: 2022-08-12

## 2022-08-12 RX ORDER — BUPIVACAINE HYDROCHLORIDE AND EPINEPHRINE 2.5; 5 MG/ML; UG/ML
INJECTION, SOLUTION EPIDURAL; INFILTRATION; INTRACAUDAL; PERINEURAL AS NEEDED
Status: DISCONTINUED | OUTPATIENT
Start: 2022-08-12 | End: 2022-08-12 | Stop reason: HOSPADM

## 2022-08-12 RX ORDER — MAGNESIUM HYDROXIDE 1200 MG/15ML
LIQUID ORAL AS NEEDED
Status: DISCONTINUED | OUTPATIENT
Start: 2022-08-12 | End: 2022-08-12 | Stop reason: HOSPADM

## 2022-08-12 RX ORDER — HYDROMORPHONE HCL/PF 1 MG/ML
0.5 SYRINGE (ML) INJECTION
Status: DISCONTINUED | OUTPATIENT
Start: 2022-08-12 | End: 2022-08-12 | Stop reason: HOSPADM

## 2022-08-12 RX ORDER — SODIUM CHLORIDE, SODIUM LACTATE, POTASSIUM CHLORIDE, CALCIUM CHLORIDE 600; 310; 30; 20 MG/100ML; MG/100ML; MG/100ML; MG/100ML
125 INJECTION, SOLUTION INTRAVENOUS CONTINUOUS
Status: DISCONTINUED | OUTPATIENT
Start: 2022-08-12 | End: 2022-08-12 | Stop reason: HOSPADM

## 2022-08-12 RX ORDER — ONDANSETRON 2 MG/ML
4 INJECTION INTRAMUSCULAR; INTRAVENOUS EVERY 6 HOURS PRN
Status: DISCONTINUED | OUTPATIENT
Start: 2022-08-12 | End: 2022-08-12 | Stop reason: HOSPADM

## 2022-08-12 RX ORDER — ROCURONIUM BROMIDE 10 MG/ML
INJECTION, SOLUTION INTRAVENOUS AS NEEDED
Status: DISCONTINUED | OUTPATIENT
Start: 2022-08-12 | End: 2022-08-12

## 2022-08-12 RX ORDER — HYDROMORPHONE HCL/PF 1 MG/ML
0.2 SYRINGE (ML) INJECTION
Status: DISCONTINUED | OUTPATIENT
Start: 2022-08-12 | End: 2022-08-12 | Stop reason: HOSPADM

## 2022-08-12 RX ORDER — OXYCODONE HYDROCHLORIDE AND ACETAMINOPHEN 5; 325 MG/1; MG/1
1 TABLET ORAL EVERY 4 HOURS PRN
Status: DISCONTINUED | OUTPATIENT
Start: 2022-08-12 | End: 2022-08-12 | Stop reason: HOSPADM

## 2022-08-12 RX ORDER — LIDOCAINE HYDROCHLORIDE 20 MG/ML
INJECTION, SOLUTION EPIDURAL; INFILTRATION; INTRACAUDAL; PERINEURAL AS NEEDED
Status: DISCONTINUED | OUTPATIENT
Start: 2022-08-12 | End: 2022-08-12

## 2022-08-12 RX ORDER — PROMETHAZINE HYDROCHLORIDE 25 MG/ML
12.5 INJECTION, SOLUTION INTRAMUSCULAR; INTRAVENOUS ONCE AS NEEDED
Status: DISCONTINUED | OUTPATIENT
Start: 2022-08-12 | End: 2022-08-12 | Stop reason: HOSPADM

## 2022-08-12 RX ORDER — GLYCOPYRROLATE 0.2 MG/ML
INJECTION INTRAMUSCULAR; INTRAVENOUS AS NEEDED
Status: DISCONTINUED | OUTPATIENT
Start: 2022-08-12 | End: 2022-08-12

## 2022-08-12 RX ORDER — NEOSTIGMINE METHYLSULFATE 1 MG/ML
INJECTION INTRAVENOUS AS NEEDED
Status: DISCONTINUED | OUTPATIENT
Start: 2022-08-12 | End: 2022-08-12

## 2022-08-12 RX ORDER — FENTANYL CITRATE 50 UG/ML
INJECTION, SOLUTION INTRAMUSCULAR; INTRAVENOUS AS NEEDED
Status: DISCONTINUED | OUTPATIENT
Start: 2022-08-12 | End: 2022-08-12

## 2022-08-12 RX ORDER — DIPHENHYDRAMINE HYDROCHLORIDE 50 MG/ML
12.5 INJECTION INTRAMUSCULAR; INTRAVENOUS ONCE AS NEEDED
Status: DISCONTINUED | OUTPATIENT
Start: 2022-08-12 | End: 2022-08-12 | Stop reason: HOSPADM

## 2022-08-12 RX ORDER — ONDANSETRON 2 MG/ML
4 INJECTION INTRAMUSCULAR; INTRAVENOUS ONCE AS NEEDED
Status: DISCONTINUED | OUTPATIENT
Start: 2022-08-12 | End: 2022-08-12 | Stop reason: HOSPADM

## 2022-08-12 RX ORDER — ONDANSETRON 2 MG/ML
INJECTION INTRAMUSCULAR; INTRAVENOUS
Status: COMPLETED
Start: 2022-08-12 | End: 2022-08-12

## 2022-08-12 RX ADMIN — FENTANYL CITRATE 50 MCG: 50 INJECTION, SOLUTION INTRAMUSCULAR; INTRAVENOUS at 11:40

## 2022-08-12 RX ADMIN — FENTANYL CITRATE 50 MCG: 50 INJECTION, SOLUTION INTRAMUSCULAR; INTRAVENOUS at 12:09

## 2022-08-12 RX ADMIN — FENTANYL CITRATE 50 MCG: 50 INJECTION, SOLUTION INTRAMUSCULAR; INTRAVENOUS at 12:39

## 2022-08-12 RX ADMIN — ONDANSETRON 4 MG: 2 INJECTION INTRAMUSCULAR; INTRAVENOUS at 13:10

## 2022-08-12 RX ADMIN — FENTANYL CITRATE 50 MCG: 50 INJECTION, SOLUTION INTRAMUSCULAR; INTRAVENOUS at 11:59

## 2022-08-12 RX ADMIN — DEXAMETHASONE SODIUM PHOSPHATE 10 MG: 10 INJECTION, SOLUTION INTRAMUSCULAR; INTRAVENOUS at 11:42

## 2022-08-12 RX ADMIN — ROCURONIUM BROMIDE 40 MG: 50 INJECTION, SOLUTION INTRAVENOUS at 11:40

## 2022-08-12 RX ADMIN — SODIUM CHLORIDE, SODIUM LACTATE, POTASSIUM CHLORIDE, AND CALCIUM CHLORIDE 125 ML/HR: .6; .31; .03; .02 INJECTION, SOLUTION INTRAVENOUS at 11:08

## 2022-08-12 RX ADMIN — GLYCOPYRROLATE 0.4 MG: 0.2 INJECTION, SOLUTION INTRAMUSCULAR; INTRAVENOUS at 12:35

## 2022-08-12 RX ADMIN — CEFAZOLIN SODIUM 2000 MG: 2 SOLUTION INTRAVENOUS at 11:39

## 2022-08-12 RX ADMIN — NEOSTIGMINE METHYLSULFATE 4 MG: 1 INJECTION INTRAVENOUS at 12:35

## 2022-08-12 RX ADMIN — ONDANSETRON 4 MG: 2 INJECTION INTRAMUSCULAR; INTRAVENOUS at 12:19

## 2022-08-12 RX ADMIN — PROPOFOL 40 MG: 10 INJECTION, EMULSION INTRAVENOUS at 11:43

## 2022-08-12 RX ADMIN — PROPOFOL 160 MG: 10 INJECTION, EMULSION INTRAVENOUS at 11:40

## 2022-08-12 RX ADMIN — LIDOCAINE HYDROCHLORIDE 100 MG: 20 INJECTION, SOLUTION EPIDURAL; INFILTRATION; INTRACAUDAL; PERINEURAL at 11:40

## 2022-08-12 NOTE — DISCHARGE INSTR - AVS FIRST PAGE
Your dressings are waterproof, you may shower with them, remove the dressings but keep the steri-strips in place on Sunday or Monday  You may continue to shower, but no tub baths until they fall off, usually about 10 days  Tylenol and or Advil for pain, Percocet is available if you need it  If you take percocet, no driving    Otherwise you may resume driving when you can get in and out of a car comfortably  If you become constipated, use a mild OTC Laxative such as MiraLax or Milk of Magnesia    No heavy lifting or strenuous activity    Call if you are having any problems

## 2022-08-12 NOTE — ANESTHESIA POSTPROCEDURE EVALUATION
Post-Op Assessment Note    CV Status:  Stable  Pain Score: 0    Pain management: adequate     Mental Status:  Alert and awake   Hydration Status:  Euvolemic   PONV Controlled:  Controlled   Airway Patency:  Patent      Post Op Vitals Reviewed: Yes      Staff: CRNA, Anesthesiologist         No complications documented      BP   177/79   Temp   98 4   Pulse  73   Resp   20   SpO2   97

## 2022-08-12 NOTE — ANESTHESIA PREPROCEDURE EVALUATION
Procedure:  CHOLECYSTECTOMY LAPAROSCOPIC (N/A Abdomen)    Relevant Problems   CARDIO   (+) Essential hypertension      GI/HEPATIC   (+) GERD (gastroesophageal reflux disease)        Physical Exam    Airway    Mallampati score: II  TM Distance: <3 FB       Dental   No notable dental hx     Cardiovascular  Cardiovascular exam normal    Pulmonary  Pulmonary exam normal     Other Findings        Anesthesia Plan  ASA Score- 2     Anesthesia Type- general with ASA Monitors  Additional Monitors:   Airway Plan: ETT  Plan Factors-Exercise tolerance (METS): >4 METS  Chart reviewed  Patient summary reviewed  Patient is not a current smoker  Induction- intravenous  Postoperative Plan- Plan for postoperative opioid use  Planned trial extubation    Informed Consent- Anesthetic plan and risks discussed with patient and spouse  I personally reviewed this patient with the CRNA  Discussed and agreed on the Anesthesia Plan with the CRNA  Mick Zacariasneftaly Temitope is a 57 y o  with pertinent history of HTN  presenting today for lap daphne  NPO since last night  Discussed risk and benefits of general which include and are not limited to: MI, stroke, sore throat, PONV, post- op pain  No new symptoms of  CP, SOB, F, chills, N/V, dizziness, numbness or tingling, cough, and other symptoms except as noted  AQA  Consented  History of anesthesia:  no personal issues/family issues  Sigrid Murcia is anxious and has been chewing on her lip[, will pre med with versed

## 2022-08-12 NOTE — INTERVAL H&P NOTE
H&P reviewed  After examining the patient I find no changes in the patients condition since the H&P had been written      Vitals:    08/12/22 1037   BP: 159/80   Pulse: 72   Resp: 16   Temp: 99 3 °F (37 4 °C)   SpO2: 98%

## 2022-08-12 NOTE — OP NOTE
OPERATIVE REPORT  PATIENT NAME: Daniel Linn    :  1960  MRN: [id number]  Pt Location: [facility]OM 01    SURGERY DATE: 2022    Surgeon(s) and Role:     * Tasia Valente MD - Primary     * Taryn Francois PA-C - Assisting    Preop Diagnosis:  Calculus of gallbladder with chronic cholecystitis without obstruction [K80 10]    Post-Op Diagnosis Codes:     * Calculus of gallbladder with chronic cholecystitis without obstruction [K80 10]    Procedure(s) (LRB):  CHOLECYSTECTOMY LAPAROSCOPIC (N/A)    Specimen(s):  ID Type Source Tests Collected by Time Destination   1 :  Tissue Gallbladder TISSUE EXAM Tasia Valente MD 2022 1207        Estimated Blood Loss:   Minimal    Drains:  * No LDAs found *    Anesthesia Type:   General    Operative Indications:  Calculus of gallbladder with chronic cholecystitis without obstruction [K80 10]      Operative Findings:  Extensive adhesions  Hydrops of the Gallbladder  Gallstone in neck of the Gallbladder  Complications:   None    Procedure and Technique:  The patient was identified by myself taken to the operating room and placed in the supine position on the operating table  After induction of satisfactory general endotracheal anesthesia she was prepped and draped in the usual sterile fashion using ChloraPrep solution  A time-out was called the patient identified as Siomara Lie,  IV antibiotics were confirmed as given sequential compression devices were on and functioning all parties agreed this was the appropriate patient for the proposed procedure  Local anesthetic consisting of 0 25% Marcaine with epinephrine was infiltrated following which a curvilinear infraumbilical incision was performed using 15 Scalpel and deepened through the skin and subcutaneous tissue  The abdominal wall was elevated with a perforating towel clip and a Veress needle was introduced    After performing the saline drop test the Veress needle was removed and the 11 mm optical cannula with a 10 mm 0 degree scope within was introduced  After breaching the peritoneum the trocar portion was removed and the scope reintroduced  There was no evidence of trauma to the viscera from the Veress needle or port placement  Attention was turned to the right upper quadrant  Under laparoscopic direction an epigastric 5 mm port, and 2 subcostal 5 mm ports were placed  The fundus of the collapsed gallbladder was grasped via the lateral subcostal port and directed cephalad  This demonstrated extensive adhesions to the anterior surface of the gallbladder  These were stripped using the [place]  As the infundibulum of the gallbladder came into view this was grasped via the medial subcostal port and directed laterally opening up the triangle of Calot  A gallstone was obviously impacted in the neck of the gallbladder  The peritoneum over the triangle of Calot was carefully incised following which dissection with the [place] demonstrated the the cystic duct and cystic artery  The cystic duct was skeletonized with the [place], securely clipped and divided  The tissue in the triangle of Calot was developed into pedicles including the cystic artery which was securely clipped and divided  The gallbladder was then delivered from its fossa using the hook attachment to the monopolar electrocautery  In the process there was some mucus fluid noted to emanate from the gallbladder suggesting hydrops of the gallbladder  The gallbladder was ultimately detached and moved over the dome of the liver  The 10 mm scope was exchanged for a 5 mm 0 degree scope which was introduced via the epigastric port  An Endo-Catch bag was introduced via the umbilical port and the gallbladder isolated within  The patient was then returned to a neutral position  The gallbladder was retrieved in the process the process of removing the 11 mm port and the Endo-Catch bag    The pneumoperitoneum was then allowed to dissipate  The fascia at the 11 mm port site was repaired using figure-of-eight 0 Vicryl suture  Generous amounts of local anesthetic were infiltrated into all the port sites and the skin closed using subcuticular 4-0 Vicryl suture  The wounds were cleansed with saline and Steri-Strips 2 x 2 gauze dressings and Tegaderm applied  The patient made an uneventful recovery from his anesthetic was extubated in the operating room moved her waiting stretcher and taken to the recovery room in good condition having tolerated the procedure well        I was present for the entire procedure, A qualified resident physician was not available and A physician assistant was required during the procedure for retraction tissue handling,dissection and suturing    Patient Disposition:  PACU  and extubated and stable      SIGNATURE: Sabina Larson MD  DATE: August 12, 2022  TIME: 12:42 PM

## 2022-08-29 ENCOUNTER — OFFICE VISIT (OUTPATIENT)
Dept: SURGERY | Facility: CLINIC | Age: 62
End: 2022-08-29

## 2022-08-29 VITALS — TEMPERATURE: 97.1 F

## 2022-08-29 DIAGNOSIS — K80.10 CALCULUS OF GALLBLADDER WITH CHRONIC CHOLECYSTITIS WITHOUT OBSTRUCTION: Primary | ICD-10-CM

## 2022-08-29 PROCEDURE — 99024 POSTOP FOLLOW-UP VISIT: CPT | Performed by: SPECIALIST

## 2022-08-29 NOTE — PATIENT INSTRUCTIONS
Case Report   Surgical Pathology Report                         Case: [id number]                                    Authorizing Provider:  Ora Cantu MD    Collected:           08/12/2022 1207               Ordering Location:     [facility] Received:            08/12/2022 1500                                      Operating Room                                                                Pathologist:           Ronald Vivar MD                                                        Specimen:    Gallbladder                                                                                Final Diagnosis   A  Gallbladder, cholecystectomy:  -Cholelithiasis  -Moderate chronic cholecystitis   -Portion of adjacent hepatic tissue (liver bed) present       Electronically signed by Ronald Vivar MD on 8/19/2022 at 10:54 AM   Note    Interpretation performed at [address] [place] [address]     Additional Information    All reported additional testing was performed with appropriately reactive controls  These tests were developed and their performance characteristics determined by [facility] or appropriate performing facility, though some tests may be performed on tissues which have not been validated for performance characteristics (such as staining performed on alcohol exposed cell blocks and decalcified tissues)  Results should be interpreted with caution and in the context of the patients clinical condition  These tests may not be cleared or approved by the [place]  Food and Drug Administration, though the FDA has determined that such clearance or approval is not necessary  These tests are used for clinical purposes and they should not be regarded as investigational or for research  This laboratory has been approved by [place] 88, designated as a high-complexity laboratory and is qualified to perform these tests    [facility] Description       A  The specimen is received in formalin, labeled with the patient's name and hospital number, and is designated "gallbladder  The specimen consists of a gallbladder measuring 6 4 x 1 7 x 1 7 cm  The serosa is tan purple, smooth and glistening  A tan well-circumscribed nodular structure measuring 0 8 cm is loosely attached to the central portion of the specimen  The cystic duct margin is inked blue and is included in the submitted sections  The lumen contains viscous bile and a single white-tan stony fragment measuring 2 4 cm which is lodged in the cystic duct  The gallbladder wall averages 1-2 mm in thickness  The mucosa is tan and velvety  Representative sections  Two cassettes  Loosely attached nodular structure in cassette 2  Note: The estimated total formalin fixation time based upon information provided by the submitting clinician and the standard processing schedule is under 72 hours  You are doing well, above is the pathology report from your surgery      I would suggest using some cocoa butter on the scars as they heal    At this point your incisional discomfort should be your guide to your activity    Let us know if you want a prescription for Cholestyramine powder for your bowels

## 2022-08-29 NOTE — PROGRESS NOTES
Assessment/Plan:    Calculus of gallbladder with chronic cholecystitis without obstruction  The patient presents in follow-up postop day 17 from outpatient laparoscopic cholecystectomy  Chronic inflammation of the gallbladder was documented, and reflected in the final diagnosis cysts on the pathology report  The patient is quite pleased with the outcome  She return for any further problems  Diagnoses and all orders for this visit:    Calculus of gallbladder with chronic cholecystitis without obstruction          Subjective:      Patient ID: Bravo Linn is a 57 y o  female  The patient presents in follow-up postop day 17 from outpatient laparoscopic cholecystectomy  The patient is quite pleased with the outcome, with regard to right upper quadrant tenderness, and symptoms of biliary colic after eating  She noted some initial diarrhea, which has now improved  She is back to everyday activity, and declines a prescription for cholestyramine, stating that her bowel habits have improved since surgery, and she only manifest diarrhea when eating greasy foods  The following portions of the patient's history were reviewed and updated as appropriate: allergies, current medications, past family history, past medical history, past social history, past surgical history and problem list     Review of Systems   Constitutional: Negative for chills and fever  Respiratory: Negative  Gastrointestinal: Positive for diarrhea  Negative for nausea and vomiting  Genitourinary: Negative  Skin: Wound: healing incisions  Objective:      Temp (!) 97 1 °F (36 2 °C)          Physical Exam  Constitutional:       Appearance: Normal appearance  Eyes:      General: No scleral icterus  Cardiovascular:      Rate and Rhythm: Normal rate and regular rhythm  Pulmonary:      Effort: Pulmonary effort is normal    Skin:     General: Skin is warm and dry

## 2022-08-29 NOTE — ASSESSMENT & PLAN NOTE
The patient presents in follow-up postop day 17 from outpatient laparoscopic cholecystectomy  Chronic inflammation of the gallbladder was documented, and reflected in the final diagnosis cysts on the pathology report  The patient is quite pleased with the outcome  She return for any further problems

## 2022-09-20 ENCOUNTER — OFFICE VISIT (OUTPATIENT)
Dept: SURGERY | Facility: CLINIC | Age: 62
End: 2022-09-20
Payer: COMMERCIAL

## 2022-09-20 VITALS
OXYGEN SATURATION: 97 % | BODY MASS INDEX: 31.89 KG/M2 | WEIGHT: 180 LBS | HEIGHT: 63 IN | TEMPERATURE: 97.1 F | RESPIRATION RATE: 18 BRPM | HEART RATE: 76 BPM | SYSTOLIC BLOOD PRESSURE: 140 MMHG | DIASTOLIC BLOOD PRESSURE: 90 MMHG

## 2022-09-20 DIAGNOSIS — R10.11 ABDOMINAL PAIN, RUQ: Primary | ICD-10-CM

## 2022-09-20 PROBLEM — D17.1 LIPOMA OF TORSO: Status: ACTIVE | Noted: 2022-09-20

## 2022-09-20 PROCEDURE — 99213 OFFICE O/P EST LOW 20 MIN: CPT | Performed by: SPECIALIST

## 2022-09-20 NOTE — PATIENT INSTRUCTIONS
I believe you strained the healing muscle from your recent surgery, it does not appear that you did any serious damage, but the ultrasound will help to put your mind at ease that there is no bleeding into the muscle  Please don't lift anything heavier than 15 lbs for the next 2 weeks  Warm compresses for 20 min 3x /day may help  A mild analgesic such as Tylenol or Advil will also help    I will call with the Ultrasound result as soon as I see it

## 2022-09-20 NOTE — ASSESSMENT & PLAN NOTE
Mass consistent with subcutaneous lipoma, present for at least 6 years  Starting to be uncomfortable with movement of her left arm  Would like to schedule excision, which will require more than local anesthesia  To discuss further, and schedule for the operating room if the patient wishes to proceed

## 2022-09-20 NOTE — PROGRESS NOTES
Assessment/Plan:    Lipoma of torso      Mass consistent with subcutaneous lipoma, present for at least 6 years  Starting to be uncomfortable with movement of her left arm  Would like to schedule excision, which will require more than local anesthesia  To discuss further, and schedule for the operating room if the patient wishes to proceed  Diagnoses and all orders for this visit:    Abdominal pain, RUQ  -     US abdominal wall; Future          Subjective:      Patient ID: Bubba Linn is a 57 y o  female  Patient is 5 and half weeks out from uneventful laparoscopic cholecystectomy, was seen in follow-up and discharged to resume full activity at her own pace  The patient calls and presents today appears that she is "ripped something open" she apparently was straining to move heavy bags of Gunner and soil in her garden, her  states that these were up to 75 lb, in the 24 hours since this activity the patient is been having spasms of pain in the right upper quadrant in fears that she "ripped something open"    Physical exam reveals the wounds to be healing uneventfully, no mass is palpable but she is markedly tender with palpation, and the area appears mildly indurated and edematous, and I suspect that she may have caused some deep tissue injury, possibly hematoma  The patient suggests a CT scan to check for deep tissue injury, I have offered her an ultrasound to rule out hematoma, and will call her with the results  At the end of the visit the patient draws attention to a subcutaneous lipomatous mass medial to her left scapula, which patient states has been increasing in size over the last 6 years, and now appears to be irritated with movement of her left arm  This measures 7 9 cm in largest dimension, and is to all large to be removed as an office procedure  I will discuss management with her when we review the abdominal wall ultrasound result        The following portions of the patient's history were reviewed and updated as appropriate: allergies, current medications, past family history, past medical history, past social history, past surgical history and problem list     Review of Systems   Constitutional: Negative for chills and fever  Respiratory: Negative  Cardiovascular: Negative  Gastrointestinal: Positive for abdominal pain  Negative for abdominal distention, constipation, diarrhea, nausea and vomiting  Skin: Wound: healing laparoscopic port site incisions  Psychiatric/Behavioral: Negative  Objective:      /90 (BP Location: Left arm, Patient Position: Sitting, Cuff Size: Standard)   Pulse 76   Temp (!) 97 1 °F (36 2 °C) (Temporal)   Resp 18   Ht 5' 3" (1 6 m)   Wt 81 6 kg (180 lb)   SpO2 97%   BMI 31 89 kg/m²          Physical Exam  Constitutional:       General: She is not in acute distress  Abdominal:      Palpations: There is no mass  Tenderness: There is abdominal tenderness  There is no guarding  Hernia: No hernia is present            Comments: Laparoscopic port site incisions healing uneventfully   Skin:

## 2022-09-21 ENCOUNTER — HOSPITAL ENCOUNTER (OUTPATIENT)
Dept: ULTRASOUND IMAGING | Facility: HOSPITAL | Age: 62
Discharge: HOME | End: 2022-09-21
Attending: SPECIALIST
Payer: COMMERCIAL

## 2022-09-21 DIAGNOSIS — R10.11 ABDOMINAL PAIN, RUQ: ICD-10-CM

## 2022-09-21 PROCEDURE — 76705 ECHO EXAM OF ABDOMEN: CPT

## 2022-10-13 ENCOUNTER — HOSPITAL ENCOUNTER (OUTPATIENT)
Dept: RADIOLOGY | Facility: HOSPITAL | Age: 62
End: 2022-10-13
Payer: COMMERCIAL

## 2022-10-13 DIAGNOSIS — M53.3 PAIN IN THE COCCYX: ICD-10-CM

## 2022-10-13 PROCEDURE — 72220 X-RAY EXAM SACRUM TAILBONE: CPT

## 2023-11-21 ENCOUNTER — TELEPHONE (OUTPATIENT)
Dept: DERMATOLOGY | Facility: CLINIC | Age: 63
End: 2023-11-21

## 2023-11-29 NOTE — TELEPHONE ENCOUNTER
Rec'd call from patient requesting return call to schedule [facility] procedure.

## 2023-12-27 ENCOUNTER — PROCEDURE VISIT (OUTPATIENT)
Dept: DERMATOLOGY | Facility: CLINIC | Age: 63
End: 2023-12-27
Payer: COMMERCIAL

## 2023-12-27 VITALS
HEART RATE: 70 BPM | OXYGEN SATURATION: 95 % | DIASTOLIC BLOOD PRESSURE: 98 MMHG | SYSTOLIC BLOOD PRESSURE: 146 MMHG | BODY MASS INDEX: 33.23 KG/M2 | TEMPERATURE: 98.4 F | WEIGHT: 187.6 LBS

## 2023-12-27 DIAGNOSIS — C44.729 SQUAMOUS CELL CARCINOMA OF LEFT LOWER LEG: Primary | ICD-10-CM

## 2023-12-27 PROCEDURE — 17313 MOHS 1 STAGE T/A/L: CPT | Performed by: DERMATOLOGY

## 2023-12-27 PROCEDURE — 12032 INTMD RPR S/A/T/EXT 2.6-7.5: CPT | Performed by: DERMATOLOGY

## 2023-12-27 RX ORDER — MELATONIN
1000 DAILY
COMMUNITY

## 2023-12-27 NOTE — PROGRESS NOTES
MOHS Procedure Note    Patient: Brigid Linn  : 1960  MRN: [id number]  Date: 2023    History of Present Illness: The patient is a 63 y.o. 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     Past Medical History:   Diagnosis Date    Aneurysm (HCC)     inoperable    Calculus of gallbladder with chronic cholecystitis without obstruction 2022    GERD (gastroesophageal reflux disease)     Hypertension     Squamous cell skin cancer 2023    left medial distal pretibial region, mohs    Urinary tract bacterial infections        Past Surgical History:   Procedure Laterality Date    ABDOMINAL SURGERY      COLONOSCOPY      DILATION AND CURETTAGE, DIAGNOSTIC / THERAPEUTIC      FL CYSTOGRAM  2020    HYSTERECTOMY      HYSTERECTOMY      IR CEREBRAL ANGIOGRAPHY  2021    IR CEREBRAL ANGIOGRAPHY / INTERVENTION  2021    MOHS SURGERY Left 2023    [facility] left medial distal pretibial region, Dr Mauricio    [facility] LAPAROSCOPY SURG CHOLECYSTECTOMY N/A 2022    Procedure: CHOLECYSTECTOMY LAPAROSCOPIC;  Surgeon: Irwin Pope MD;  Location: [facility];  Service: General    TONSILLECTOMY      TUBAL LIGATION           Current Outpatient Medications:     cholecalciferol (VITAMIN D3) 1,000 units tablet, Take 1,000 Units by mouth daily, Disp: , Rfl:     cyanocobalamin (VITAMIN B-12) 1000 MCG tablet, Take 1,000 mcg by mouth as needed , Disp: , Rfl:     metoprolol succinate (TOPROL-XL) 50 mg 24 hr tablet, Take 25 mg by mouth daily , Disp: , Rfl:     Multiple Vitamins-Minerals (MULTIVITAMIN ADULTS 50+ PO), Take 1 tablet by mouth daily -takes beet root and green vegg supplement, Disp: , Rfl:     Multiple Vitamins-Minerals (ZINC PO), Take by mouth, Disp: , Rfl:     Nutritional Supplements (JUICE PLUS FIBRE PO), Take by mouth 2 (two) times a day, Disp: , Rfl:     amLODIPine (NORVASC) 5 mg tablet, Take 5 mg by mouth daily (Patient  not taking: Reported on 12/27/2023), Disp: , Rfl:     aspirin (ECOTRIN LOW STRENGTH) 81 mg EC tablet, Take 81 mg by mouth daily (Patient not taking: Reported on 12/27/2023), Disp: , Rfl:     oxyCODONE-acetaminophen (PERCOCET) 5-325 mg per tablet, Take 1 tablet by mouth every 4 (four) hours as needed for moderate pain for up to 12 doses Max Daily Amount: 6 tablets (Patient not taking: Reported on 8/29/2022), Disp: 12 tablet, Rfl: 0    No Known Allergies    Physical Exam:   Vitals:    12/27/23 0834   BP: 146/98   Pulse: 70   Temp: 98.4 °F (36.9 °C)   SpO2: 95%     General: Awake, Alert, Oriented x 3, Mood and affect appropriate  Respiratory: Respirations even and unlabored  Cardiovascular: Peripheral pulses intact; no edema  Musculoskeletal Exam: n/a    Skin: 0.8 cm x 0.8 cm pink patch    Assessment: Biopsy confirmed squamous cell carcinoma of the left medial distal pretibial region.     Plan: Mohs    Time of H&P Completion:0910    [facility] Procedure Timeout      Flowsheet Row Most Recent Value   Timeout: 0914   Patient Identity Verified: Yes   Correct Site Verified: Yes   Correct Procedure Verified: Yes            [facility] Diagnosis/Indication/Location/ID      Flowsheet Row Most Recent Value   Pathology Type Squamous cell carcinoma   Anatomic Site --  [left medial distal pretibial region]   Indications for MOHS tumor location   MOHS ID [id number]            [facility] Site/Accession/Pre-Post      Flowsheet Row Most Recent Value   Original Site Identified (as submitted by referring clinician) Photo, Referral   Biopsy Accession/Specimen # (as submitted by referring clincian) [id number]   Pre-MOHS Size Length (cm) 0.8   Pre-MOHS Size Width (cm) 0.8   Post-MOHS Size-Length (cm) 1.8   Post MOHS Size-Width (cm) 1   Repair Type Intermediate layered closure   Suture Type Vicryl, Prolene   Prolene Suture Size 4   Vicryl Suture Size 4   Final repair length (cm): 3   Anesthetic Used 1% Lidocaine with epinephrine  [1.8]            MOHS Tumor  Stage 1 Information      Flowsheet Row Most Recent Value   Tissue Sections (blocks) 2   Microscopic Exam Section 1: No tumor identified in section.   Microscopic Exam Section 2: No tumor identified in section.   Tumor Clear After Stage I? Yes                        Patient identified, procedure verified, site identified and verified. Time out completed. Surgical removal of the lesion discussed with the patient (risks and benefits, including possibility of scarring, infection, recurrence or potential for further treatment)  I have specifically identified the site with the patient. I have discussed the fact that the patient will have a scar after the procedure regardless of granulation or repair with sutures. I have discussed that the repair options can range from granulation in some cases to linear or curvilinear closures to larger flaps or grafts.  There are sometimes flaps or grafts used that require multiples stages of surgery and will not be completed today, rather be completed over a series of appointments. I have discussed that occasionally due to location, size or depth of the lesion I may recommend consultation with and transfer of care for further removal or the reconstruction to another provider such as ophthalmology surgery, plastic surgery, ENT surgery, or surgical oncology. There are cases in which other testing such as imaging with MRI or CT scan or testing of lymph nodes is recommended because of the nature/depth/location of tumor seen during the removal. There is a risk of injury to nerves causing temporary or permanent numbness or the inability to move muscles full such as the inability to lift eyebrows. Questions answered and verbal and written consent was obtained.    The tumor qualifies for Mohs based on AUC criteria. Dr. Mauricio served as the surgeon and pathologist during the procedure.    With the patient in the supine position and under adequate local anesthesia with 1% lidocaine with  epinephrine 1:100,000, the defect was scrubbed with Chlorhexidine. Sterile drapes were placed from the sterile tray.  Because of the location of the surgical defect, an intermediate closure was judged to give the best possible cosmetic and functional result.  The edges of the defect were carefully debrided removing any dead or coagulated tissue.      Hemostasis was obtained by pinpoint electrocoagulation.  Careful planning of removal of redundant tissue at either end of the defect was drawn out so that the suture lines would fall in the optimal orientation with regard to the relaxed skin tension lines.  These were then removed with a #15 blade scalpel.  The wound was then approximated by a deep layer of buried vertical mattress sutures and the cutaneous margins were approximated and closed by superficial sutures as noted above.  Estimated blood loss was less than 5 mL.      The patient tolerated the procedure well.  The wound was dressed with petrolatum, a non-stick pad, and a compression dressing.     Chance Mauricio MD served as the surgeon and pathologist during the procedure.    Postoperative care: Wound care discussed at length.  I urged the patient to call us if any problems or question should arise.     Complications: none  Post-op medications: none  Patient condition after procedure: stable  Discharge plans: Plan for return to Mohs for suture removal, as scheduled in 14 days.     SCC cleared with 1 stage of mohs and repaired with 3cm closure.  Well tolerated.  S/R in 2 weeks.  Scribe Attestation      I,:  Torie Parekh am acting as a scribe while in the presence of the attending physician.:       I,:  Chance Mauricio MD personally performed the services described in this documentation    as scribed in my presence.:

## 2023-12-27 NOTE — PATIENT INSTRUCTIONS
"[facility]sc[facility] Surgery After Care    WOUND CARE AFTER SURGERY:    Do NOT to remove the pressure bandage for 48 hours. Keep the area clean and dry while this bandage is on.    After removing the bandage for the first time, gently clean the area with soap and water. If the bandage is difficult to remove, getting the bandage wet in the shower will sometimes help soften the adhesive and allow it to be removed more easily.     You will now need to cleanse this area daily in the shower with gentle soap. There is no need to scrub the area. Apply plain Vaseline ointment (this is over the counter and not a prescription) to the site followed by a clean appropriately sized bandage to area.  Non stick dressing and paper tape (or Hypafix) are recommended for sensitive skin but a bandaid is fine if it covers the area well.    You will need to continue the above daily wound care until you return for suture removal in 14 days (generally 7 days for the face, 10-14 days off the face)      RESTRICTIONS:     For two DAYS:   - You will need to take it very easy as this time is highest risk for bleeding. Being a \"couch potato\" during these two days is generally recommended.   - For surgeries on the face/neck/scalp: Avoid leaning down to pick things up off the floor as this brings blood up to your head. Instead, squat down to pick things up.     For two WEEKS:   - No heavy lifting (anything greater than 10 pounds)   - You can start to do slow, gentle activities such as slow walking but nothing to increase your heart rate and blood pressure too much (such as cardiovascular exercise). It is important to take it easy as there is still a risk for bleeding and a high risk popping of stitches open during this time.     MANAGING YOUR PAIN AFTER SURGERY     You can expect to have some pain after surgery. This is normal. The pain is typically worse the first two days after surgery, and quickly begins to get better.     The best strategy for " controlling your pain after surgery is around the clock pain control. You can take over the counter Acetaminophen (Tylenol) for discomfort, if no contraindications.     If you are taking this at the maximum dose, you can alternate this with Motrin (ibuprofen or Advil) as well. Alternating these medications with each other allows you to maximize your pain control. In addition to Tylenol and Motrin, you can use heating pads or ice packs on your incisions to help reduce your pain.     How will I alternate your regular strength over-the-counter pain medication?  You will take a dose of pain medication every three hours.   Start by taking 650 mg of Tylenol (2 pills of 325 mg)   3 hours later take 600 mg of Motrin (3 pills of 200 mg)   3 hours after taking the Motrin take 650 mg of Tylenol   3 hours after that take 600 mg of Motrin.    See example - if your first dose of Tylenol is at 12:00 PM     12:00 PM  Tylenol 650 mg (2 pills of 325 mg)    3:00 PM  Motrin 600 mg (3 pills of 200 mg)    6:00 PM  Tylenol 650 mg (2 pills of 325 mg)    9:00 PM  Motrin 600 mg (3 pills of 200 mg)    Continue alternating every 3 hours      Important:   Do not take more than 4000mg of Tylenol or 3200mg of Motrin in a 24-hour period.     What if I still have pain?   If you have pain that is not controlled with the over-the-counter pain medications (Tylenol and Motrin or Advil), don't hesitate to call our staff using the number provided. We will help make sure you are managing your pain in the best way possible, and if necessary, we can provide a prescription for additional pain medication.     CALL OUR OFFICE IMMEDIATELY FOR ANY SIGNS OF INFECTION:    This includes fever, chills, increased redness, warmth, tenderness, severe discomfort/pain, or pus or foul smell coming from the wound. If you are experiencing any of the above, please call [facility] directly at [phone].    IF BLEEDING IS NOTICED:    Place a clean cloth  over the area and apply firm pressure for thirty minutes.  Check the wound ONLY after 30 minutes of direct pressure; do not cheat and sneak a peak, as that does not count.  If bleeding persists after 30 minutes of legitimate direct pressure, then try one more round of direct pressure to the area.  Should the bleeding become heavier or not stop after the second attempt, call [facility] directly at [phone]. Your call will get routed to the dermatology surgeon on call even after hours.

## 2024-01-08 ENCOUNTER — OFFICE VISIT (OUTPATIENT)
Dept: DERMATOLOGY | Facility: CLINIC | Age: 64
End: 2024-01-08

## 2024-01-08 DIAGNOSIS — Z48.02 ENCOUNTER FOR REMOVAL OF SUTURES: Primary | ICD-10-CM

## 2024-01-08 PROCEDURE — NC001 PR NO CHARGE: Performed by: DERMATOLOGY

## 2024-01-08 NOTE — PROGRESS NOTES
"Suture removal    Date/Time: 1/8/2024 1:00 PM    Performed by: Sahra Fink RN  Authorized by: Chance Mauricio MD  Universal Protocol:  Consent: Verbal consent obtained. Written consent not obtained.  Risks and benefits: risks, benefits and alternatives were discussed  Consent given by: patient  Time out: Immediately prior to procedure a \"time out\" was called to verify the correct patient, procedure, equipment, support staff and site/side marked as required.  Timeout called at: 1/8/2024 1:44 PM.  Patient understanding: patient states understanding of the procedure being performed  Patient consent: the patient's understanding of the procedure matches consent given  Procedure consent: procedure consent matches procedure scheduled  Relevant documents: relevant documents present and verified  Test results: test results not available  Site marked: the operative site was not marked  Radiology Images displayed and confirmed. If images not available, report reviewed: imaging studies not available  Patient identity confirmed: verbally with patient      Patient location:  Clinic  Location:     Laterality:  Left    Location:  Lower extremity    Lower extremity location: medial distal pretibial region.  Procedure details:     Tools used:  Suture removal kit    Wound appearance:  No sign(s) of infection, good wound healing, clean, moist, nonpurulent, nontender and pink    Number of sutures removed:  9  Post-procedure details:     Post-removal:  Steri-Strips applied    Patient tolerance of procedure:  Tolerated well, no immediate complications  Comments:      Patient was encouraged to continue to clean and care for the wound until fully healed. Patient was encouraged to continue to follow up for regular full body skin exams as scheduled.          Well healing scar, sutures removed.    Scribe Attestation      I,:  Sahra Fink RN am acting as a scribe while in the presence of the attending physician.:       I,:  Chance CONNOLLY" MD Hang personally performed the services described in this documentation    as scribed in my presence.:

## 2024-02-20 ENCOUNTER — APPOINTMENT (OUTPATIENT)
Dept: LAB | Facility: CLINIC | Age: 64
End: 2024-02-20
Payer: COMMERCIAL

## 2024-02-20 DIAGNOSIS — D49.2 ODONTOGENIC TUMOR: ICD-10-CM

## 2024-02-20 LAB
ANION GAP SERPL CALCULATED.3IONS-SCNC: 10 MMOL/L
BUN SERPL-MCNC: 16 MG/DL (ref 5–25)
CALCIUM SERPL-MCNC: 9.3 MG/DL (ref 8.4–10.2)
CHLORIDE SERPL-SCNC: 105 MMOL/L (ref 96–108)
CO2 SERPL-SCNC: 26 MMOL/L (ref 21–32)
CREAT SERPL-MCNC: 0.83 MG/DL (ref 0.6–1.3)
ERYTHROCYTE [DISTWIDTH] IN BLOOD BY AUTOMATED COUNT: 12.7 % (ref 11.6–15.1)
GFR SERPL CREATININE-BSD FRML MDRD: 74 ML/MIN/1.73SQ M
GLUCOSE SERPL-MCNC: 92 MG/DL (ref 65–140)
HCT VFR BLD AUTO: 48.8 % (ref 34.8–46.1)
HGB BLD-MCNC: 16.1 G/DL (ref 11.5–15.4)
MCH RBC QN AUTO: 30.3 PG (ref 26.8–34.3)
MCHC RBC AUTO-ENTMCNC: 33 G/DL (ref 31.4–37.4)
MCV RBC AUTO: 92 FL (ref 82–98)
PLATELET # BLD AUTO: 246 THOUSANDS/UL (ref 149–390)
PMV BLD AUTO: 10.7 FL (ref 8.9–12.7)
POTASSIUM SERPL-SCNC: 4.2 MMOL/L (ref 3.5–5.3)
RBC # BLD AUTO: 5.32 MILLION/UL (ref 3.81–5.12)
SODIUM SERPL-SCNC: 141 MMOL/L (ref 135–147)
WBC # BLD AUTO: 4.51 THOUSAND/UL (ref 4.31–10.16)

## 2024-02-20 PROCEDURE — 85027 COMPLETE CBC AUTOMATED: CPT

## 2024-02-20 PROCEDURE — 36415 COLL VENOUS BLD VENIPUNCTURE: CPT

## 2024-02-20 PROCEDURE — 80048 BASIC METABOLIC PNL TOTAL CA: CPT

## 2024-02-25 ENCOUNTER — APPOINTMENT (EMERGENCY)
Dept: RADIOLOGY | Facility: HOSPITAL | Age: 64
End: 2024-02-25
Payer: COMMERCIAL

## 2024-02-25 ENCOUNTER — HOSPITAL ENCOUNTER (EMERGENCY)
Facility: HOSPITAL | Age: 64
Discharge: HOME | End: 2024-02-26
Attending: EMERGENCY MEDICINE
Payer: COMMERCIAL

## 2024-02-25 ENCOUNTER — APPOINTMENT (EMERGENCY)
Dept: CT IMAGING | Facility: HOSPITAL | Age: 64
End: 2024-02-25
Payer: COMMERCIAL

## 2024-02-25 VITALS
DIASTOLIC BLOOD PRESSURE: 71 MMHG | TEMPERATURE: 98.2 F | RESPIRATION RATE: 19 BRPM | OXYGEN SATURATION: 94 % | HEART RATE: 75 BPM | SYSTOLIC BLOOD PRESSURE: 164 MMHG

## 2024-02-25 DIAGNOSIS — R42 LIGHTHEADEDNESS: Primary | ICD-10-CM

## 2024-02-25 DIAGNOSIS — I10 HYPERTENSION: ICD-10-CM

## 2024-02-25 LAB
ALBUMIN SERPL BCP-MCNC: 4.3 G/DL (ref 3.5–5)
ALP SERPL-CCNC: 101 U/L (ref 34–104)
ALT SERPL W P-5'-P-CCNC: 17 U/L (ref 7–52)
ANION GAP SERPL CALCULATED.3IONS-SCNC: 9 MMOL/L
AST SERPL W P-5'-P-CCNC: 17 U/L (ref 13–39)
BASOPHILS # BLD AUTO: 0.05 THOUSANDS/ÂΜL (ref 0–0.1)
BASOPHILS NFR BLD AUTO: 1 % (ref 0–1)
BILIRUB SERPL-MCNC: 0.72 MG/DL (ref 0.2–1)
BUN SERPL-MCNC: 16 MG/DL (ref 5–25)
CALCIUM SERPL-MCNC: 9.3 MG/DL (ref 8.4–10.2)
CARDIAC TROPONIN I PNL SERPL HS: 3 NG/L
CHLORIDE SERPL-SCNC: 105 MMOL/L (ref 96–108)
CO2 SERPL-SCNC: 24 MMOL/L (ref 21–32)
CREAT SERPL-MCNC: 0.82 MG/DL (ref 0.6–1.3)
EOSINOPHIL # BLD AUTO: 0.06 THOUSAND/ÂΜL (ref 0–0.61)
EOSINOPHIL NFR BLD AUTO: 1 % (ref 0–6)
ERYTHROCYTE [DISTWIDTH] IN BLOOD BY AUTOMATED COUNT: 12.4 % (ref 11.6–15.1)
GFR SERPL CREATININE-BSD FRML MDRD: 75 ML/MIN/1.73SQ M
GLUCOSE SERPL-MCNC: 116 MG/DL (ref 65–140)
HCT VFR BLD AUTO: 47.2 % (ref 34.8–46.1)
HGB BLD-MCNC: 15.7 G/DL (ref 11.5–15.4)
IMM GRANULOCYTES # BLD AUTO: 0.03 THOUSAND/UL (ref 0–0.2)
IMM GRANULOCYTES NFR BLD AUTO: 0 % (ref 0–2)
LYMPHOCYTES # BLD AUTO: 1.54 THOUSANDS/ÂΜL (ref 0.6–4.47)
LYMPHOCYTES NFR BLD AUTO: 22 % (ref 14–44)
MCH RBC QN AUTO: 31 PG (ref 26.8–34.3)
MCHC RBC AUTO-ENTMCNC: 33.3 G/DL (ref 31.4–37.4)
MCV RBC AUTO: 93 FL (ref 82–98)
MONOCYTES # BLD AUTO: 0.6 THOUSAND/ÂΜL (ref 0.17–1.22)
MONOCYTES NFR BLD AUTO: 9 % (ref 4–12)
NEUTROPHILS # BLD AUTO: 4.78 THOUSANDS/ÂΜL (ref 1.85–7.62)
NEUTS SEG NFR BLD AUTO: 67 % (ref 43–75)
NRBC BLD AUTO-RTO: 0 /100 WBCS
PLATELET # BLD AUTO: 240 THOUSANDS/UL (ref 149–390)
PMV BLD AUTO: 9.9 FL (ref 8.9–12.7)
POTASSIUM SERPL-SCNC: 4.2 MMOL/L (ref 3.5–5.3)
PROT SERPL-MCNC: 7 G/DL (ref 6.4–8.4)
RBC # BLD AUTO: 5.07 MILLION/UL (ref 3.81–5.12)
SODIUM SERPL-SCNC: 138 MMOL/L (ref 135–147)
WBC # BLD AUTO: 7.06 THOUSAND/UL (ref 4.31–10.16)

## 2024-02-25 PROCEDURE — 80053 COMPREHEN METABOLIC PANEL: CPT | Performed by: EMERGENCY MEDICINE

## 2024-02-25 PROCEDURE — 70496 CT ANGIOGRAPHY HEAD: CPT

## 2024-02-25 PROCEDURE — 71045 X-RAY EXAM CHEST 1 VIEW: CPT

## 2024-02-25 PROCEDURE — 85025 COMPLETE CBC W/AUTO DIFF WBC: CPT | Performed by: EMERGENCY MEDICINE

## 2024-02-25 PROCEDURE — 99284 EMERGENCY DEPT VISIT MOD MDM: CPT

## 2024-02-25 PROCEDURE — 84484 ASSAY OF TROPONIN QUANT: CPT | Performed by: EMERGENCY MEDICINE

## 2024-02-25 PROCEDURE — 99285 EMERGENCY DEPT VISIT HI MDM: CPT | Performed by: EMERGENCY MEDICINE

## 2024-02-25 PROCEDURE — 70498 CT ANGIOGRAPHY NECK: CPT

## 2024-02-25 PROCEDURE — 36415 COLL VENOUS BLD VENIPUNCTURE: CPT | Performed by: EMERGENCY MEDICINE

## 2024-02-25 RX ORDER — VALSARTAN 40 MG/1
40 TABLET ORAL DAILY
Qty: 30 TABLET | Refills: 0 | Status: SHIPPED | OUTPATIENT
Start: 2024-02-25 | End: 2024-03-26

## 2024-02-25 RX ADMIN — IOHEXOL 85 ML: 350 INJECTION, SOLUTION INTRAVENOUS at 21:24

## 2024-02-26 NOTE — ED NOTES
Pt ambulated independently to bathroom; no gait disturbance noted.     Saloni Gill RN  02/25/24 9518

## 2024-02-26 NOTE — ED PROVIDER NOTES
"History  Chief Complaint   Patient presents with    High Blood Pressure     Was at her pcp a couple days ago for medical clearance for a procedure, they noticed her bp was high. Reports it continuing ot be high and experiencing dizziness     64 yo female presenting to the ed for reports of a few weeks of not feeling well intermittently. Describing intermittent hand and feet numbness/tingling B/L, blurry vision, \"loopyness\", dizziness, sob, generalized weakness.  Patient and  state that her high blood pressure is secondary to her being around people who receive the COVID-vaccine shedding things.  States that she does not believe it is related to her stopping her blood pressure medications.  Patient states that the symptoms are all intermittent.  States that her only symptom currently is her hands feel tingly and she feels short of breath and loopy.  Denies visual changes at this time.        Prior to Admission Medications   Prescriptions Last Dose Informant Patient Reported? Taking?   Multiple Vitamins-Minerals (MULTIVITAMIN ADULTS 50+ PO)  Self Yes No   Sig: Take 1 tablet by mouth daily -takes beet root and green vegg supplement   Multiple Vitamins-Minerals (ZINC PO)  Self Yes No   Sig: Take by mouth   Nutritional Supplements (JUICE PLUS FIBRE PO)  Self Yes No   Sig: Take by mouth 2 (two) times a day   amLODIPine (NORVASC) 5 mg tablet  Self Yes No   Sig: Take 5 mg by mouth daily   Patient not taking: Reported on 12/27/2023   aspirin (ECOTRIN LOW STRENGTH) 81 mg EC tablet  Self Yes No   Sig: Take 81 mg by mouth daily   Patient not taking: Reported on 12/27/2023   cholecalciferol (VITAMIN D3) 1,000 units tablet  Self Yes No   Sig: Take 1,000 Units by mouth daily   cyanocobalamin (VITAMIN B-12) 1000 MCG tablet  Self Yes No   Sig: Take 1,000 mcg by mouth as needed    metoprolol succinate (TOPROL-XL) 50 mg 24 hr tablet  Self Yes No   Sig: Take 25 mg by mouth daily    oxyCODONE-acetaminophen (PERCOCET) 5-325 mg per " tablet   No No   Sig: Take 1 tablet by mouth every 4 (four) hours as needed for moderate pain for up to 12 doses Max Daily Amount: 6 tablets   Patient not taking: Reported on 2022      Facility-Administered Medications: None       Past Medical History:   Diagnosis Date    Aneurysm (HCC)     inoperable    Calculus of gallbladder with chronic cholecystitis without obstruction 2022    GERD (gastroesophageal reflux disease)     Hypertension     Squamous cell skin cancer 2023    left medial distal pretibial region, mohs    Urinary tract bacterial infections        Past Surgical History:   Procedure Laterality Date    ABDOMINAL SURGERY      COLONOSCOPY      DILATION AND CURETTAGE, DIAGNOSTIC / THERAPEUTIC      FL CYSTOGRAM  2020    HYSTERECTOMY      HYSTERECTOMY      IR CEREBRAL ANGIOGRAPHY  2021    IR CEREBRAL ANGIOGRAPHY / INTERVENTION  2021    MOHS SURGERY Left 2023    SCC left medial distal pretibial region, Dr Mauricio    GA LAPAROSCOPY SURG CHOLECYSTECTOMY N/A 2022    Procedure: CHOLECYSTECTOMY LAPAROSCOPIC;  Surgeon: Irwin Pope MD;  Location: [facility];  Service: General    TONSILLECTOMY      TUBAL LIGATION         Family History   Problem Relation Age of Onset    Hypertension Mother     Hypertension Father     No Known Problems Sister      I have reviewed and agree with the history as documented.    E-Cigarette/Vaping    E-Cigarette Use Never User      E-Cigarette/Vaping Substances    Nicotine No     THC No     CBD No     Flavoring No     Other No     Unknown No      Social History     Tobacco Use    Smoking status: Former     Current packs/day: 0.00     Types: Cigarettes     Quit date: 4/15/2007     Years since quittin.8    Smokeless tobacco: Never   Vaping Use    Vaping status: Never Used   Substance Use Topics    Alcohol use: Yes     Comment: once month    Drug use: Not Currently     Types: Marijuana     Comment: Marijuana at 15 and 16 years  old.        Review of Systems   Eyes:  Positive for visual disturbance.   Respiratory:  Positive for shortness of breath.    Neurological:  Positive for light-headedness and numbness.   All other systems reviewed and are negative.      Physical Exam  Physical Exam  Vitals and nursing note reviewed.   Constitutional:       General: She is not in acute distress.     Appearance: She is well-developed. She is not diaphoretic.   HENT:      Head: Normocephalic and atraumatic.      Right Ear: External ear normal.      Left Ear: External ear normal.      Nose: Nose normal.   Eyes:      General: No scleral icterus.        Right eye: No discharge.         Left eye: No discharge.      Conjunctiva/sclera: Conjunctivae normal.      Pupils: Pupils are equal, round, and reactive to light.   Neck:      Vascular: No JVD.      Trachea: No tracheal deviation.   Cardiovascular:      Rate and Rhythm: Normal rate and regular rhythm.      Heart sounds: Normal heart sounds. No murmur heard.     No friction rub. No gallop.   Pulmonary:      Effort: Pulmonary effort is normal. No respiratory distress.      Breath sounds: Normal breath sounds. No stridor. No wheezing or rales.   Abdominal:      General: Bowel sounds are normal. There is no distension.      Palpations: Abdomen is soft. There is no mass.      Tenderness: There is no abdominal tenderness. There is no guarding.   Musculoskeletal:         General: No swelling, tenderness or deformity. Normal range of motion.      Cervical back: Normal range of motion and neck supple.      Right lower leg: No edema.      Left lower leg: No edema.   Skin:     General: Skin is warm and dry.      Coloration: Skin is not pale.      Findings: No erythema or rash.   Neurological:      General: No focal deficit present.      Mental Status: She is alert and oriented to person, place, and time. Mental status is at baseline.      Cranial Nerves: No cranial nerve deficit.      Sensory: No sensory deficit.       Motor: No weakness or abnormal muscle tone.      Comments: 5/5 strength x 4 extremities  Gross sensation intact  CN intact  GCS 15  No noted dysarthria or aphasia  No visual disturbances reported on exam, no visual field cuts     Psychiatric:         Behavior: Behavior normal.         Thought Content: Thought content normal.         Judgment: Judgment normal.         Vital Signs  ED Triage Vitals [02/25/24 1854]   Temperature Pulse Respirations Blood Pressure SpO2   98.2 °F (36.8 °C) 74 18 (!) 222/110 98 %      Temp Source Heart Rate Source Patient Position - Orthostatic VS BP Location FiO2 (%)   Tympanic Monitor Lying Left arm --      Pain Score       2           Vitals:    02/25/24 2030 02/25/24 2039 02/25/24 2130 02/25/24 2200   BP: (!) 173/73 (!) 173/73 (!) 183/76 164/71   Pulse: 72  88 75   Patient Position - Orthostatic VS:             Visual Acuity      ED Medications  Medications   iohexol (OMNIPAQUE) 350 MG/ML injection (SINGLE-DOSE) 85 mL (85 mL Intravenous Given 2/25/24 2124)       Diagnostic Studies  Results Reviewed       Procedure Component Value Units Date/Time    HS Troponin 0hr (reflex protocol) [[id number]]  (Normal) Collected: 02/1960    Lab Status: Final result Specimen: Blood from Arm, Right Updated: 02/25/24 2027     hs TnI 0hr 3 ng/L     Comprehensive metabolic panel [[id number]] Collected: 02/1960    Lab Status: Final result Specimen: Blood from Arm, Right Updated: 02/25/24 2026     Sodium 138 mmol/L      Potassium 4.2 mmol/L      Chloride 105 mmol/L      CO2 24 mmol/L      ANION GAP 9 mmol/L      BUN 16 mg/dL      Creatinine 0.82 mg/dL      Glucose 116 mg/dL      Calcium 9.3 mg/dL      AST 17 U/L      ALT 17 U/L      Alkaline Phosphatase 101 U/L      Total Protein 7.0 g/dL      Albumin 4.3 g/dL      Total Bilirubin 0.72 mg/dL      eGFR 75 ml/min/1.73sq m     Narrative:      National Kidney Disease Foundation guidelines for Chronic Kidney Disease (CKD):     Stage 1 with normal or  high GFR (GFR > 90 mL/min/1.73 square meters)    Stage 2 Mild CKD (GFR = 60-89 mL/min/1.73 square meters)    Stage 3A Moderate CKD (GFR = 45-59 mL/min/1.73 square meters)    Stage 3B Moderate CKD (GFR = 30-44 mL/min/1.73 square meters)    Stage 4 Severe CKD (GFR = 15-29 mL/min/1.73 square meters)    Stage 5 End Stage CKD (GFR <15 mL/min/1.73 square meters)  Note: GFR calculation is accurate only with a steady state creatinine    CBC and differential [[id number]]  (Abnormal) Collected: 02/1960    Lab Status: Final result Specimen: Blood from Arm, Right Updated: 02/25/24 2003     WBC 7.06 Thousand/uL      RBC 5.07 Million/uL      Hemoglobin 15.7 g/dL      Hematocrit 47.2 %      MCV 93 fL      MCH 31.0 pg      MCHC 33.3 g/dL      RDW 12.4 %      MPV 9.9 fL      Platelets 240 Thousands/uL      nRBC 0 /100 WBCs      Neutrophils Relative 67 %      Immat GRANS % 0 %      Lymphocytes Relative 22 %      Monocytes Relative 9 %      Eosinophils Relative 1 %      Basophils Relative 1 %      Neutrophils Absolute 4.78 Thousands/µL      Immature Grans Absolute 0.03 Thousand/uL      Lymphocytes Absolute 1.54 Thousands/µL      Monocytes Absolute 0.60 Thousand/µL      Eosinophils Absolute 0.06 Thousand/µL      Basophils Absolute 0.05 Thousands/µL                    CTA head and neck with and without contrast   Final Result by Subhash Rosa MD (02/26 0915)      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 Findings stable since 2021.      Mild mass effect on the left cerebellar hemisphere and vermis by the aneurysm appears unchanged since 2021. No increasing mass effect or cerebellar parenchymal changes to explain progressive dizziness.      No acute abnormalities. Specifically, no subarachnoid hemorrhage, new aneurysms, or new hemodynamically significant stenosis.                   Workstation performed: [id number]         XR chest 1 view portable    (Results Pending)              Procedures  ECG 12 Lead Documentation Only    Date/Time: 2/25/2024 8:15 PM    Performed by: Julien Layton DO  Authorized by: Julien Layton DO    Interpretation:     Interpretation: normal    Rate:     ECG rate:  73    ECG rate assessment: normal    Rhythm:     Rhythm: sinus rhythm    Ectopy:     Ectopy: none    QRS:     QRS axis:  Normal    QRS intervals:  Normal  Conduction:     Conduction: normal    ST segments:     ST segments:  Normal  T waves:     T waves: normal             ED Course               HEART Risk Score      Flowsheet Row Most Recent Value   Heart Score Risk Calculator    History 0 Filed at: 02/26/2024 1139   ECG 0 Filed at: 02/26/2024 1139   Age 2 Filed at: 02/26/2024 1139   Risk Factors 1 Filed at: 02/26/2024 1139   Troponin 0 Filed at: 02/26/2024 1139   HEART Score 3 Filed at: 02/26/2024 1139                          SBIRT 20yo+      Flowsheet Row Most Recent Value   Initial Alcohol Screen: US AUDIT-C     1. How often do you have a drink containing alcohol? 0 Filed at: 02/25/2024 2047   2. How many drinks containing alcohol do you have on a typical day you are drinking?  0 Filed at: 02/25/2024 2047   3a. Male UNDER 65: How often do you have five or more drinks on one occasion? 0 Filed at: 02/25/2024 2047   3b. FEMALE Any Age, or MALE 65+: How often do you have 4 or more drinks on one occassion? 0 Filed at: 02/25/2024 2047   Audit-C Score 0 Filed at: 02/25/2024 2047   VERO: How many times in the past year have you...    Used an illegal drug or used a prescription medication for non-medical reasons? Never Filed at: 02/25/2024 2047                      Medical Decision Making  63-year-old female with history of hypertension who stopped her amlodipine about 1 year ago presenting for elevated blood pressure intermittently hand and feet tingling, shortness of breath, intermittent blurry  vision and feeling dizzy/loopy.  Only symptoms on presentation here in the ER or shortness of breath, tingling in her hands.  Discussed with patient CT a head and neck along with cardiac workup.  Patient initially declining CTA as she states that her aneurysm is not surgically accessible and she would rather just go home and deal with it if she knew was that however patient then amenable to this.  Did discuss with patient that she would likely require medications to control her blood pressure but she states that she would prefer to perform testing first prior to starting medications.  Discussed with patient and  starting BP meds, she states she does not want to take amlodipine and would prefer to start any medication at the lowest possible dose. Will start on valsartan 40mg as patient has follow up with her PCP this week.       Signed out to Dr. López pending CT scan results.     Amount and/or Complexity of Data Reviewed  Labs: ordered.  Radiology: ordered.    Risk  Prescription drug management.             Disposition  Final diagnoses:   Lightheadedness   Hypertension     Time reflects when diagnosis was documented in both MDM as applicable and the Disposition within this note       Time User Action Codes Description Comment    2/25/2024 11:51 PM Julien Layton Add [R42] Lightheadedness     2/25/2024 11:52 PM Julien Layton Add [I10] Hypertension           ED Disposition       ED Disposition   Discharge    Condition   Stable    Date/Time   Mon Feb 26, 2024 0027    Comment   Brigid Linn discharge to home/self care.                   Follow-up Information       Follow up With Specialties Details Why Contact Info Additional Information    Subhash Calvin DO Family Medicine   [address]  [place] [id number] [phone]       [facility] Emergency Department Emergency Medicine Go to  As needed, If symptoms worsen [address] [place]  [id number] [phone] [facility], [address], [place], [place] [address]            Discharge Medication List as of 2/26/2024 12:27 AM        START taking these medications    Details   valsartan (DIOVAN) 40 mg tablet Take 1 tablet (40 mg total) by mouth daily, Starting Sun 2/25/2024, Until Tue 3/26/2024, Normal           CONTINUE these medications which have NOT CHANGED    Details   amLODIPine (NORVASC) 5 mg tablet Take 5 mg by mouth daily, Historical Med      aspirin (ECOTRIN LOW STRENGTH) 81 mg EC tablet Take 81 mg by mouth daily, Historical Med      cholecalciferol (VITAMIN D3) 1,000 units tablet Take 1,000 Units by mouth daily, Historical Med      cyanocobalamin (VITAMIN B-12) 1000 MCG tablet Take 1,000 mcg by mouth as needed , Historical Med      metoprolol succinate (TOPROL-XL) 50 mg 24 hr tablet Take 25 mg by mouth daily , Starting Tue 12/10/2019, Historical Med      Multiple Vitamins-Minerals (MULTIVITAMIN ADULTS 50+ PO) Take 1 tablet by mouth daily -takes beet root and green vegg supplement, Historical Med      Multiple Vitamins-Minerals (ZINC PO) Take by mouth, Historical Med      Nutritional Supplements (JUICE PLUS FIBRE PO) Take by mouth 2 (two) times a day, Historical Med      oxyCODONE-acetaminophen (PERCOCET) 5-325 mg per tablet Take 1 tablet by mouth every 4 (four) hours as needed for moderate pain for up to 12 doses Max Daily Amount: 6 tablets, Starting Fri 8/12/2022, Normal             No discharge procedures on file.    PDMP Review       None            ED Provider  Electronically Signed by             Julien Layton DO  02/26/24 [id number]

## 2024-02-26 NOTE — ED NOTES
Pt ambulated to bathroom independently; no gait disturbance or c/o dizziness noted.      Saloni Gill RN  02/25/24 7933

## 2024-03-07 NOTE — PRE-PROCEDURE INSTRUCTIONS
Pre-Surgery Instructions:   Medication Instructions    Apple Cider Vinegar-Ginger 500-5 MG CHEW Stop taking 7 days prior to surgery.    Bioflavonoid Products (Vitamin C) CHEW Stop taking 7 days prior to surgery.    cholecalciferol (VITAMIN D3) 1,000 units tablet Stop taking 7 days prior to surgery.    cyanocobalamin (VITAMIN B-12) 1000 MCG tablet Stop taking 7 days prior to surgery.    metoprolol succinate (TOPROL-XL) 50 mg 24 hr tablet Take day of surgery.    Multiple Vitamins-Minerals (MULTIVITAMIN ADULTS 50+ PO) Stop taking 7 days prior to surgery.    Multiple Vitamins-Minerals (ZINC PO) Stop taking 7 days prior to surgery.    Nutritional Supplements (JUICE PLUS FIBRE PO) Stop taking 7 days prior to surgery.    valsartan (DIOVAN) 40 mg tablet Hold day of surgery.   Medication instructions for day surgery reviewed. Please use only a sip of water to take your instructed medications. Avoid all over the counter vitamins, supplements and NSAIDS for one week prior to surgery per anesthesia guidelines. Tylenol is ok to take as needed.     You will receive a call one business day prior to surgery with an arrival time and hospital directions. If your surgery is scheduled on a Monday, the hospital will be calling you on the Friday prior to your surgery. If you have not heard from anyone by 8pm, please call the hospital supervisor through the hospital  at [phone]. ([place] [phone] or [place] [phone]).    Do not eat or drink anything after midnight the night before your surgery, including candy, mints, lifesavers, or chewing gum. Do not drink alcohol 24hrs before your surgery. Try not to smoke at least 24hrs before your surgery.       Follow the pre surgery showering instructions as listed in the “My Surgical Experience Booklet” or otherwise provided by your surgeon's office. Do not use a blade to shave the surgical area 1 week before surgery. It is okay to use a clean electric clippers up to 24  hours before surgery. Do not apply any lotions, creams, including makeup, cologne, deodorant, or perfumes after showering on the day of your surgery. Do not use dry shampoo, hair spray, hair gel, or any type of hair products.     No contact lenses, eye make-up, or artificial eyelashes. Remove nail polish, including gel polish, and any artificial, gel, or acrylic nails if possible. Remove all jewelry including rings and body piercing jewelry.     Wear causal clothing that is easy to take on and off. Consider your type of surgery.    Keep any valuables, jewelry, piercings at home. Please bring any specially ordered equipment (sling, braces) if indicated.    Arrange for a responsible person to drive you to and from the hospital on the day of your surgery. Please confirm the visitor policy for the day of your procedure when you receive your phone call with an arrival time.     Call the surgeon's office with any new illnesses, exposures, or additional questions prior to surgery.    Please reference your “My Surgical Experience Booklet” for additional information to prepare for your upcoming surgery.

## 2024-03-13 ENCOUNTER — ANESTHESIA (OUTPATIENT)
Dept: PERIOP | Facility: HOSPITAL | Age: 64
End: 2024-03-13
Payer: COMMERCIAL

## 2024-03-13 ENCOUNTER — HOSPITAL ENCOUNTER (OUTPATIENT)
Facility: HOSPITAL | Age: 64
Setting detail: OUTPATIENT SURGERY
Discharge: HOME | End: 2024-03-13
Attending: PLASTIC SURGERY | Admitting: PLASTIC SURGERY
Payer: COMMERCIAL

## 2024-03-13 ENCOUNTER — ANESTHESIA EVENT (OUTPATIENT)
Dept: PERIOP | Facility: HOSPITAL | Age: 64
End: 2024-03-13
Payer: COMMERCIAL

## 2024-03-13 VITALS
DIASTOLIC BLOOD PRESSURE: 72 MMHG | RESPIRATION RATE: 16 BRPM | SYSTOLIC BLOOD PRESSURE: 178 MMHG | TEMPERATURE: 97.3 F | OXYGEN SATURATION: 98 % | WEIGHT: 187 LBS | BODY MASS INDEX: 33.13 KG/M2 | HEART RATE: 67 BPM | HEIGHT: 63 IN

## 2024-03-13 DIAGNOSIS — D49.2 NEOPLASM OF UNSPECIFIED BEHAVIOR OF BONE, SOFT TISSUE, AND SKIN: ICD-10-CM

## 2024-03-13 PROCEDURE — 88341 IMHCHEM/IMCYTCHM EA ADD ANTB: CPT | Performed by: PATHOLOGY

## 2024-03-13 PROCEDURE — 88305 TISSUE EXAM BY PATHOLOGIST: CPT | Performed by: PATHOLOGY

## 2024-03-13 PROCEDURE — 88377 M/PHMTRC ALYS ISHQUANT/SEMIQ: CPT | Performed by: PATHOLOGY

## 2024-03-13 PROCEDURE — 88342 IMHCHEM/IMCYTCHM 1ST ANTB: CPT | Performed by: PATHOLOGY

## 2024-03-13 PROCEDURE — 88307 TISSUE EXAM BY PATHOLOGIST: CPT | Performed by: PATHOLOGY

## 2024-03-13 RX ORDER — ONDANSETRON 4 MG/1
4 TABLET, ORALLY DISINTEGRATING ORAL EVERY 6 HOURS PRN
Status: DISCONTINUED | OUTPATIENT
Start: 2024-03-13 | End: 2024-03-13 | Stop reason: HOSPADM

## 2024-03-13 RX ORDER — METOCLOPRAMIDE HYDROCHLORIDE 5 MG/ML
10 INJECTION INTRAMUSCULAR; INTRAVENOUS ONCE AS NEEDED
Status: COMPLETED | OUTPATIENT
Start: 2024-03-13 | End: 2024-03-13

## 2024-03-13 RX ORDER — FENTANYL CITRATE 50 UG/ML
INJECTION, SOLUTION INTRAMUSCULAR; INTRAVENOUS AS NEEDED
Status: DISCONTINUED | OUTPATIENT
Start: 2024-03-13 | End: 2024-03-13

## 2024-03-13 RX ORDER — ONDANSETRON 2 MG/ML
4 INJECTION INTRAMUSCULAR; INTRAVENOUS ONCE AS NEEDED
Status: COMPLETED | OUTPATIENT
Start: 2024-03-13 | End: 2024-03-13

## 2024-03-13 RX ORDER — FENTANYL CITRATE/PF 50 MCG/ML
50 SYRINGE (ML) INJECTION
Status: DISCONTINUED | OUTPATIENT
Start: 2024-03-13 | End: 2024-03-13 | Stop reason: HOSPADM

## 2024-03-13 RX ORDER — PROPOFOL 10 MG/ML
INJECTION, EMULSION INTRAVENOUS CONTINUOUS PRN
Status: DISCONTINUED | OUTPATIENT
Start: 2024-03-13 | End: 2024-03-13

## 2024-03-13 RX ORDER — IBUPROFEN 600 MG/1
600 TABLET ORAL EVERY 6 HOURS PRN
Status: DISCONTINUED | OUTPATIENT
Start: 2024-03-13 | End: 2024-03-13 | Stop reason: HOSPADM

## 2024-03-13 RX ORDER — MAGNESIUM HYDROXIDE 1200 MG/15ML
LIQUID ORAL AS NEEDED
Status: DISCONTINUED | OUTPATIENT
Start: 2024-03-13 | End: 2024-03-13 | Stop reason: HOSPADM

## 2024-03-13 RX ORDER — SODIUM CHLORIDE, SODIUM LACTATE, POTASSIUM CHLORIDE, CALCIUM CHLORIDE 600; 310; 30; 20 MG/100ML; MG/100ML; MG/100ML; MG/100ML
100 INJECTION, SOLUTION INTRAVENOUS CONTINUOUS
Status: DISCONTINUED | OUTPATIENT
Start: 2024-03-13 | End: 2024-03-13 | Stop reason: HOSPADM

## 2024-03-13 RX ORDER — HYDROMORPHONE HCL IN WATER/PF 6 MG/30 ML
0.2 PATIENT CONTROLLED ANALGESIA SYRINGE INTRAVENOUS
Status: DISCONTINUED | OUTPATIENT
Start: 2024-03-13 | End: 2024-03-13 | Stop reason: HOSPADM

## 2024-03-13 RX ORDER — SODIUM CHLORIDE, SODIUM LACTATE, POTASSIUM CHLORIDE, CALCIUM CHLORIDE 600; 310; 30; 20 MG/100ML; MG/100ML; MG/100ML; MG/100ML
INJECTION, SOLUTION INTRAVENOUS CONTINUOUS PRN
Status: DISCONTINUED | OUTPATIENT
Start: 2024-03-13 | End: 2024-03-13

## 2024-03-13 RX ORDER — ONDANSETRON 2 MG/ML
INJECTION INTRAMUSCULAR; INTRAVENOUS AS NEEDED
Status: DISCONTINUED | OUTPATIENT
Start: 2024-03-13 | End: 2024-03-13

## 2024-03-13 RX ORDER — LABETALOL HYDROCHLORIDE 5 MG/ML
10 INJECTION, SOLUTION INTRAVENOUS
Status: DISCONTINUED | OUTPATIENT
Start: 2024-03-13 | End: 2024-03-13 | Stop reason: HOSPADM

## 2024-03-13 RX ORDER — BUPIVACAINE HYDROCHLORIDE 5 MG/ML
INJECTION, SOLUTION EPIDURAL; INTRACAUDAL AS NEEDED
Status: DISCONTINUED | OUTPATIENT
Start: 2024-03-13 | End: 2024-03-13 | Stop reason: HOSPADM

## 2024-03-13 RX ORDER — HYDROMORPHONE HCL/PF 1 MG/ML
0.5 SYRINGE (ML) INJECTION
Status: DISCONTINUED | OUTPATIENT
Start: 2024-03-13 | End: 2024-03-13 | Stop reason: HOSPADM

## 2024-03-13 RX ADMIN — FENTANYL CITRATE 50 MCG: 50 INJECTION, SOLUTION INTRAMUSCULAR; INTRAVENOUS at 11:03

## 2024-03-13 RX ADMIN — PROPOFOL 85 MCG/KG/MIN: 10 INJECTION, EMULSION INTRAVENOUS at 09:13

## 2024-03-13 RX ADMIN — SODIUM CHLORIDE, SODIUM LACTATE, POTASSIUM CHLORIDE, AND CALCIUM CHLORIDE: .6; .31; .03; .02 INJECTION, SOLUTION INTRAVENOUS at 09:06

## 2024-03-13 RX ADMIN — HYDROMORPHONE HYDROCHLORIDE 0.2 MG: 0.2 INJECTION, SOLUTION INTRAMUSCULAR; INTRAVENOUS; SUBCUTANEOUS at 11:30

## 2024-03-13 RX ADMIN — FENTANYL CITRATE 50 MCG: 50 INJECTION, SOLUTION INTRAMUSCULAR; INTRAVENOUS at 11:20

## 2024-03-13 RX ADMIN — ONDANSETRON 4 MG: 2 INJECTION INTRAMUSCULAR; INTRAVENOUS at 10:33

## 2024-03-13 RX ADMIN — FENTANYL CITRATE 100 MCG: 50 INJECTION, SOLUTION INTRAMUSCULAR; INTRAVENOUS at 09:13

## 2024-03-13 RX ADMIN — ONDANSETRON 4 MG: 2 INJECTION INTRAMUSCULAR; INTRAVENOUS at 11:01

## 2024-03-13 RX ADMIN — METOCLOPRAMIDE 10 MG: 5 INJECTION, SOLUTION INTRAMUSCULAR; INTRAVENOUS at 11:15

## 2024-03-13 RX ADMIN — LABETALOL HYDROCHLORIDE 10 MG: 5 INJECTION, SOLUTION INTRAVENOUS at 11:28

## 2024-03-13 NOTE — ANESTHESIA POSTPROCEDURE EVALUATION
Post-Op Assessment Note    CV Status:  Stable    Pain management: adequate       Mental Status:  Alert and awake   Hydration Status:  Euvolemic   PONV Controlled:  Controlled   Airway Patency:  Patent     Post Op Vitals Reviewed: Yes    No anethesia notable event occurred.    Staff: CRNA               BP (!) 176/90 (03/13/24 1042)    Temp 97.5 °F (36.4 °C) (03/13/24 1042)    Pulse 81 (03/13/24 1042)   Resp 16 (03/13/24 1042)    SpO2 97 % (03/13/24 1042)

## 2024-03-13 NOTE — DISCHARGE INSTR - AVS FIRST PAGE
1.  Do not take shower until tomorrow so superglue does not rub off back  2.  Return office in 2 weeks

## 2024-03-13 NOTE — ANESTHESIA PREPROCEDURE EVALUATION
Medical History    History Comments   Hypertension    GERD (gastroesophageal reflux disease)    Urinary tract bacterial infections    Aneurysm (HCC) inoperable   Calculus of gallbladder with chronic cholecystitis without obstruction    Squamous cell skin cancer left medial distal pretibial region, mohs   Colon polyp    Procedure:  EXCISION  MASS OF BACK (Left: Back)    Relevant Problems   ANESTHESIA (within normal limits)      CARDIO   (+) Essential hypertension      GI/HEPATIC   (+) GERD (gastroesophageal reflux disease)        Physical Exam    Airway    Mallampati score: II  TM Distance: >3 FB  Neck ROM: full     Dental       Cardiovascular  Rate: normal    Pulmonary  Pulmonary exam normal     Other Findings  Per pt denies anything remaining that is loose or removeable=post-pubertal.      Anesthesia Plan  ASA Score- 3     Anesthesia Type- IV sedation with anesthesia with ASA Monitors.         Additional Monitors:     Airway Plan:     Comment: Per patient, appropriately NPO, denies active CP/SOB/wheezing/symptoms related to heartburn/nausea/vomiting  .       Plan Factors-Exercise tolerance (METS): >4 METS.    Chart reviewed.    Patient summary reviewed.    Patient is not a current smoker.              Induction- intravenous.    Postoperative Plan-     Informed Consent- Anesthetic plan and risks discussed with patient.  I personally reviewed this patient with the CRNA. Discussed and agreed on the Anesthesia Plan with the CRNA..

## 2024-03-13 NOTE — OP NOTE
OPERATIVE REPORT  PATIENT NAME: Brigid Linn    :  1960  MRN: [id number]  Pt Location:  OR ROOM 08    SURGERY DATE: 3/13/2024    Surgeons and Role:     * Francois Delgado MD - Primary    Preop and postoperative diagnosis:  1.  Left scapula lipoma  2.  Left upper scapular skin lesion    Procedure(s):  1.Left - EXCISION  MASS OF BACK  2, removal left upper scapular skin lesion    Specimen(s):  ID Type Source Tests Collected by Time Destination   1 : LIPOMA OF LEFT SCAPULA Tissue Soft Tissue, Lipoma TISSUE EXAM Francois Delgado MD 3/13/2024 0933    2 : LESION OF LEFT UPPER SCAPULA Tissue Lesion TISSUE EXAM Francois Delgado MD 3/13/2024 1029      Operative history: The patient has a large mass overlying the left scapula that was uncomfortable for her.  At this time this revealed a well encapsulated lesion but it was under the latissimus dorsi muscle and attached to the serratus anterior muscle.  This made removal of this 4 x 5 cm mass very difficult.  Closure of the incision to access this was 4 cm.  Just superior to the mass was a pink irregularly shaped 4 x 6 mm skin lesion suspicious for a basal cell carcinoma.  While under anesthesia this was also removed taking 3 mm margins.  Total length of closure of this area was 15 mm    Operative procedure: The patient was taken to the operating room placed in a lateral position with the right side down on the operating table.  A transverse incision was made over the mass overlying the left scapula.  By blunt this and sharp dissection the subcutaneous tissues were dissected away until the capsule of the mass was identified.  Hemostasis here in the subcutaneous tissues as throughout the rest of the procedure was achieved with the Bovie.  With retraction of the latissimus dorsi muscle away from the surface of the mass it was then circumscribed mostly sharply as needed including where attached to the serratus anterior muscle.  Some of the muscle fibers had to be  taken with this to ensure that all the fatty tissue was also removed.  Following its removal careful hemostasis in the cavity so created was achieved again with the Bovie.  Where necessary a 3-0 Vicryl suture ligature was also placed.  Skin wound was closed with a 3-0 Vicryl subcutaneous suture followed by a 3-0 Monocryl subcuticular skin closure.  That was supplemented with 5-0 chromic catgut interrupted simple sutures and then superglue.    Next a transverse ellipse of skin as mention was incised about the mass of the more superior area of the left upper scapula.  This allowed excision of this lesion.  Again hemostasis was achieved with the Bovie.  That site was closed with a 4-0 Monocryl subcuticular skin closure and then superglue.  The patient tolerated this procedure satisfactorily and was taken to the recovery area in good condition.    SIGNATURE: Francois Delgado MD  DATE: March 13, 2024  TIME: 10:52 AM

## 2024-03-28 ENCOUNTER — TELEPHONE (OUTPATIENT)
Dept: HEMATOLOGY ONCOLOGY | Facility: CLINIC | Age: 64
End: 2024-03-28

## 2024-03-28 NOTE — TELEPHONE ENCOUNTER
Patient Call    Who are you speaking with? Physician Office    If it is not the patient, are they listed on an active communication consent form? N/A   What is the reason for this call? She called to schedule pt with Dr. Beckwith. She said Dr. Delgado will be calling pt tomorrow to inform of her results and this appt   Does this require a call back? N/A   If a call back is required, please list best call back number N/a   If a call back is required, advise that a message will be forwarded to their care team and someone will return their call as soon as possible.   Did you relay this information to the patient? N/A

## 2024-04-16 ENCOUNTER — TELEPHONE (OUTPATIENT)
Dept: HEMATOLOGY ONCOLOGY | Facility: CLINIC | Age: 64
End: 2024-04-16

## 2024-04-16 ENCOUNTER — CONSULT (OUTPATIENT)
Dept: SURGICAL ONCOLOGY | Facility: CLINIC | Age: 64
End: 2024-04-16
Payer: COMMERCIAL

## 2024-04-16 VITALS
HEIGHT: 63 IN | SYSTOLIC BLOOD PRESSURE: 134 MMHG | TEMPERATURE: 98.2 F | WEIGHT: 186.4 LBS | DIASTOLIC BLOOD PRESSURE: 80 MMHG | HEART RATE: 72 BPM | BODY MASS INDEX: 33.03 KG/M2 | OXYGEN SATURATION: 97 %

## 2024-04-16 DIAGNOSIS — C49.9 SARCOMA (HCC): ICD-10-CM

## 2024-04-16 DIAGNOSIS — D17.1 LIPOMA OF TORSO: Primary | ICD-10-CM

## 2024-04-16 PROCEDURE — 99203 OFFICE O/P NEW LOW 30 MIN: CPT | Performed by: SURGERY

## 2024-04-16 NOTE — TELEPHONE ENCOUNTER
Patient Running Late       Who are you speaking with? Patient   If it is not the patient, are they listed on an active communication consent form? N/A   When is the appointment scheduled?  Please list date and time 4/16/24 @ 245   At which location is the appointment scheduled to take place? [place]   If patient is running less than 15 minutes late, have patient proceed to office. Appointment may need to be rescheduled at providers discretion. If provider cannot see patient today, the office will reschedule the visit.     Was this relayed to patient? Yes     *Maybe 10 minutes*

## 2024-04-16 NOTE — PROGRESS NOTES
Surgical Oncology Consult       [facility]  [address] [facility] [address]  [phone]    Brigid Lacey Temitope  1960  [id number]  [facility]  [address] [facility] [address]  [phone]    Diagnoses and all orders for this visit:    Lipoma of torso    Sarcoma (HCC)  -     BUN; Future  -     Creatinine, serum; Future  -     MRI chest wall wo and w contrast; Future        Chief Complaint   Patient presents with    Consult       Return in about 3 weeks (around 5/7/2024) for Office Visit, Imaging - See orders.    Oncology History    No history exists.       History of Present Illness: 63-year-old female who had what she felt was a lipoma by her left scapula.  Over the last 5 to 7 years she noticed this was increasing in size, but then it stopped growing.  Because it stopped growing she felt this was a lipoma, but had it excised because she was noticing some discomfort with movement.  She then had excision.  This was well encapsulated but under the latissimus dorsi and attached to the serratus anterior muscle.  This was excised using sharp and blunt dissection cording to the operative report.  Pathology revealed atypical lipomatous tumor.  Margins could not be determined.  He comes in now to discuss this.  She is no longer feeling any masses.  She does have a history of another lipoma.  No shortness of breath.    Review of Systems  Complete ROS Surg Onc:   Constitutional: The patient denies new or recent history of general fatigue, no recent weight loss, no change in appetite.   Eyes: No complaints of visual problems, no scleral icterus.   ENT: no complaints of ear pain, no hoarseness, no difficulty swallowing,  no tinnitus and no new masses in head, oral cavity, or neck.   Cardiovascular: No complaints of chest pain, no palpitations, no ankle  edema.   Respiratory: No complaints of shortness of breath, no cough.   Gastrointestinal: No complaints of jaundice, no bloody stools, no pale stools.   Genitourinary: No complaints of dysuria, no hematuria, no nocturia, no frequent urination, no urethral discharge.   Musculoskeletal: No complaints of weakness, paralysis, joint stiffness or arthralgias.  Integumentary: No complaints of rash, no new lesions.   Neurological: No complaints of convulsions, no seizures, no dizziness.   Hematologic/Lymphatic: No complaints of easy bruising.   Endocrine:  No hot or cold intolerance.  No polydipsia, polyphagia, or polyuria.  Allergy/immunology:  No environmental allergies.  No food allergies.  Not immunocompromised.  Skin:  No pallor or rash.  No wound.          Patient Active Problem List   Diagnosis    GERD (gastroesophageal reflux disease)    Essential hypertension    Stroke-like symptoms    Intracranial aneurysm    Lipoma of torso     Past Medical History:   Diagnosis Date    Aneurysm (HCC)     inoperable    Calculus of gallbladder with chronic cholecystitis without obstruction 07/18/2022    Colon polyp     GERD (gastroesophageal reflux disease)     Hypertension     Squamous cell skin cancer 11/08/2023    left medial distal pretibial region, mohs    Urinary tract bacterial infections      Past Surgical History:   Procedure Laterality Date    ABDOMINAL SURGERY      COLONOSCOPY  2021    DILATION AND CURETTAGE, DIAGNOSTIC / THERAPEUTIC      FL CYSTOGRAM  03/16/2020    HYSTERECTOMY      HYSTERECTOMY  2020    IR CEREBRAL ANGIOGRAPHY  02/05/2021    IR CEREBRAL ANGIOGRAPHY / INTERVENTION  05/14/2021    MOHS SURGERY Left 12/27/2023    SCC left medial distal pretibial region, Dr Mauricio    NC EXC B9 LESION MRGN XCP SK TG T/A/L 3.1-4.0 CM Left 3/13/2024    Procedure: EXCISION  MASS OF BACK;  Surgeon: Francois Delgado MD;  Location:  MAIN OR;  Service: Plastics    NC LAPAROSCOPY SURG CHOLECYSTECTOMY N/A 08/12/2022    Procedure:  CHOLECYSTECTOMY LAPAROSCOPIC;  Surgeon: Irwin Pope MD;  Location: [facility];  Service: General    TONSILLECTOMY      TUBAL LIGATION       Family History   Problem Relation Age of Onset    Hypertension Mother     Hypertension Father     No Known Problems Sister      Social History     Socioeconomic History    Marital status: /Civil Union     Spouse name: Not on file    Number of children: Not on file    Years of education: Not on file    Highest education level: Not on file   Occupational History     Employer: [employer]   Tobacco Use    Smoking status: Former     Current packs/day: 0.00     Types: Cigarettes     Quit date: 4/15/2007     Years since quittin.0    Smokeless tobacco: Never   Vaping Use    Vaping status: Never Used   Substance and Sexual Activity    Alcohol use: Yes     Comment: once month    Drug use: Not Currently     Types: Marijuana     Comment: Marijuana at 15 and 16 years old.     Sexual activity: Not Currently   Other Topics Concern    Not on file   Social History Narrative    Not on file     Social Determinants of Health     Financial Resource Strain: Not on file   Food Insecurity: Not on file   Transportation Needs: Not on file   Physical Activity: Not on file   Stress: Not on file   Social Connections: Not on file   Intimate Partner Violence: Not on file   Housing Stability: Not on file       Current Outpatient Medications:     Apple Cider Vinegar-Ginger 500-5 MG CHEW, Chew, Disp: , Rfl:     Bioflavonoid Products (Vitamin C) CHEW, Chew, Disp: , Rfl:     cholecalciferol (VITAMIN D3) 1,000 units tablet, Take 1,000 Units by mouth daily, Disp: , Rfl:     cyanocobalamin (VITAMIN B-12) 1000 MCG tablet, Take 1,000 mcg by mouth as needed , Disp: , Rfl:     metoprolol succinate (TOPROL-XL) 50 mg 24 hr tablet, Take 25 mg by mouth daily , Disp: , Rfl:     Multiple Vitamins-Minerals (MULTIVITAMIN ADULTS 50+ PO), Take 1 tablet by mouth daily -takes beet root and green vegg  supplement, Disp: , Rfl:     Multiple Vitamins-Minerals (ZINC PO), Take by mouth, Disp: , Rfl:     Nutritional Supplements (JUICE PLUS FIBRE PO), Take 3 capsules by mouth 2 (two) times a day, Disp: , Rfl:     valsartan (DIOVAN) 40 mg tablet, Take 1 tablet (40 mg total) by mouth daily, Disp: 30 tablet, Rfl: 0    amLODIPine (NORVASC) 5 mg tablet, Take 5 mg by mouth daily (Patient not taking: Reported on 12/27/2023), Disp: , Rfl:     aspirin (ECOTRIN LOW STRENGTH) 81 mg EC tablet, Take 81 mg by mouth daily (Patient not taking: Reported on 12/27/2023), Disp: , Rfl:     oxyCODONE-acetaminophen (PERCOCET) 5-325 mg per tablet, Take 1 tablet by mouth every 4 (four) hours as needed for moderate pain for up to 12 doses Max Daily Amount: 6 tablets (Patient not taking: Reported on 8/29/2022), Disp: 12 tablet, Rfl: 0  No Known Allergies  Vitals:    04/16/24 1453   BP: 134/80   Pulse: 72   Temp: 98.2 °F (36.8 °C)   SpO2: 97%       Physical Exam   Constitutional: General appearance: The Patient is well-developed and well-nourished who appears the stated age in no acute distress. Patient is pleasant and talkative.     HEENT:  Normocephalic.  Sclerae are anicteric. Mucous membranes are moist. Neck is supple without adenopathy. No JVD.     Chest: The lungs are clear to auscultation.     Cardiac: Heart is regular rate.     Abdomen: Abdomen is soft, non-tender, non-distended and without masses.     Extremities: There is no clubbing or cyanosis. There is no edema.  Symmetric.  Neuro: Grossly nonfocal. Gait is normal.     Lymphatic: No evidence of cervical adenopathy bilaterally.   No evidence of axillary adenopathy on the left.    Skin: Warm, anicteric.  Incision is C/D/I.  No obvious recurrence.    Psych:  Patient is pleasant and talkative.  Breasts:      Pathology:  Final Diagnosis   A. Soft Tissue mass, LIPOMA OF LEFT SCAPULA, excision:  - Atypical lipomatous tumor, 8 cm in greatest gross dimension.     -- Confirmed by  Positive/Amplified MDM2 fluorescence in-situ hybridization (FISH).         ** See PMR for complete scanned report ([facility] Specimen ID: [id number];             interpreted by ISSAC Tolentino MD PhD).      -- Involvement/entrapment of skeletal muscle fibers and scattered foci of fat necrosis identified.      -- CD34 immunostain positive spindle cells.      -- FNCLCC Grade 1 of 3 (tumor differentiation score 1 of 3; mitotic count/0 mitoses/10 HPF,        score 1 of 3; < 50% tumor necrosis, score 1 of 2; total score 3 of 8).      -- Lymphovascular invasion: Not identified.      -- Margins: Cannot be determined; mature adipose tissue/possible atypical lipomatous tumor         extends to examined unoriented margins. See Note.      B. Skin Lesion, LESION OF LEFT UPPER SCAPULA, excision:  - Benign lichenoid keratosis.    - Negative for malignancy.       -- SOX10 and Melan-A immunostains confirm no atypical melanocytic proliferation.         Comments:   This is an appended report. These results have been appended to a previously preliminary verified report.      Electronically signed by Conchita Garcia MD on 3/26/2024 at  6:31 PM   Preliminary Diagnosis   A. Soft Tissue, Lipoma, LIPOMA OF LEFT SCAPULA, excision:  - Well-differentiated lipomatous neoplasm with entrapped skeletal muscle fibers consistent      with intramuscular component.    -- Associated spindle cells CD34 immunostain positive.     -- Foci of fat necrosis.     -- MDM2 FISH pending. See Preliminary Note.      B. Skin Lesion, LESION OF LEFT UPPER SCAPULA, excision:  - Benign lichenoid keratosis.    - Negative for malignancy.       -- SOX10 and Melan-A immunostains confirm no atypical melanocytic proliferation.     Preliminary Note: For specimen A, to completely exclude an atypical lipomatous tumor, MDM2 FISH is pending. A final report will be issued when the result is available. Vamo message sent to Dr. RORO Delgado on 3/20/24 @ 0930 hours. A copy of the  preliminary report is also sent.    Preliminary result electronically signed by Conchita Garcia MD on 3/20/2024 at  9:48 AM   Note    Intradepartmental consultation (IK) agrees with the final diagnosis for specimen A. TigerConnect message sent to Dr. RORO Delgado on 3/26/24 @ 1615 hours. A copy of the final report is also sent.      It is difficult to exclude margin involvement by atypical lipomatous tumor given the well-differentiated nature of the tumor and similarity to benign mature adipose tissue.      Best representative tumor block: A3.    Comment: This is an appended report. These results have been appended to a previously preliminary verified report.   Additional Information    All reported additional testing was performed with appropriately reactive controls.  These tests were developed and their performance characteristics determined by [facility] or appropriate performing facility, though some tests may be performed on tissues which have not been validated for performance characteristics (such as staining performed on alcohol exposed cell blocks and decalcified tissues).  Results should be interpreted with caution and in the context of the patients’ clinical condition. These tests may not be cleared or approved by the U.S. Food and Drug Administration, though the FDA has determined that such clearance or approval is not necessary. These tests are used for clinical purposes and they should not be regarded as investigational or for research. This laboratory has been approved by CLIA 88, designated as a high-complexity laboratory and is qualified to perform these tests.  Interpretation performed at [facility], [address] [facility] [address].   Synoptic Checklist   SOFT TISSUE: Resection   8th Edition - Protocol posted: 6/30/2021SOFT TISSUE: RESECTION - All Specimens  CLINICAL   Preresection Treatment  No known preresection therapy   SPECIMEN   Procedure  Marginal resection    TUMOR   Tumor Focality  Unifocal   Tumor Site  Trunk and extremities: Left scapula (Back/shoulder)   Tumor Size  Greatest Dimension (Centimeters): 8 cm   Histologic Type (WHO)  Atypical lipomatous tumor   Histologic Grade (FNCLCC)  Grade 1   Mitotic Rate  0  mitoses per 10 high-power fields (HPF)   Necrosis (macroscopic or microscopic)  Present   Extent of Necrosis  < 50  %   Treatment Effect  No known presurgical therapy   Lymphovascular Invasion  Not identified   MARGINS   Margin Status  Cannot be determined: mature adipose tissue/possible atypical lipomatous tumor present at inked (unoriented) margins.     REGIONAL LYMPH NODES   Regional Lymph Node Status  Not applicable (no regional lymph nodes submitted or found)   PATHOLOGIC STAGE CLASSIFICATION (pTNM, AJCC 8th Edition)   Reporting of pT, pN, and (when applicable) pM categories is based on information available to the pathologist at the time the report is issued. As per the AJCC (Chapter 1, 8th Ed.) it is the managing physician’s responsibility to establish the final pathologic stage based upon all pertinent information, including but potentially not limited to this pathology report.   Pathologic Stage Classification  Histologic type appropriate for staging        pT Category  pT2   pN Category  pN not assigned (no nodes submitted or found)   SPECIAL STUDIES   Cytogenetics  Not performed   Molecular Pathology  MDM2 FISH Positive/Amplified (Gen[facility] Specimen ID: [id number])   Comment(s)  8th ed. AJCC Pathologic Prognostic Stage Group: at least Stage IB- pT2, pN0, G1.          Labs:      Imaging  DXA BONE DENSITY SCAN STANDARD 2 SITES    Result Date: 4/1/2024  Narrative: PROCEDURE: DXA bone mineral density examination was performed with a Hologic scanner. CLINICAL HISTORY: This is a 63 year old postmenopausal female that requires a bone density assessment. BONE DENSITY: Total Lumbar Spine: 0.798 grams/cm2 which correlates with a Z-score -0.4 and a T-score -2.3,  classification of Osteopenia. Femoral Neck (Left): 0.620 grams/cm2 which correlates with a Z-score -0.5 and a T-score -2.1, classification of Osteopenia. Total Hip (Left): 0.772 grams/cm2 which correlates with a Z-score -0.1 and a T-score -1.4, classification of Osteopenia. WHO Fracture Assessment Tool (FRAX) estimates 10- year fracture risk as follows: Major Osteoporotic Fracture: 10% Hip Fracture: 1.6% Interpretation: The patient has osteopenia of the lumbar spine and left hip as determined by WHO criteria. Based on the results of the patient's bone density assessment, the risk of future fracture increases approximately two fold for each 1.0 SD decrease in T-score. Assessment: A repeat bone density assessment should be considered in two years. The National Osteoporosis Foundation Recommends That FDA Approved Medical Therapies Be Considered In Postmenopausal Women And Men Age Greater Than Or Equal To 50 Years With The Followin.  Hip Or Vertebral Fracture; 2.  T-Score Of Less Than Or Equal To -2.5 At The Spine Or Hip; 3.  Osteopenia And 10-Year Fracture Risk Probability By Frax Of Greater than Or Equal to 20% For Major Osteoporotic Fracture and Greater than Or Equal to 3% For Hip Fracture.    Comment: All Treatment Decisions, Including Pharmacologic Treatment, Require Clinical Judgment and Consideration Of Individual Patient Factors, Including Patient Preferences, Comorbidities, Previous Drug Use And Risk Factors not captured in the Frax Model, e.g. Fragility, Falls, Vitamin D Deficiency, Increased Bone turnover, and Interval Significant Decline In Bone Mineral Density. Workstation:[id number]    I personally reviewed and interpreted the above laboratory and imaging data.    Discussion/Summary: 63-year-old female status post excision of an atypical lipomatous tumor.  This was marginally resected and margins cannot be determined.  The lesion was 5 cm in size and was below the latissimus dorsi muscle.  I discussed  that she will likely require a conservative resection.  I have recommended obtaining an MRI of the resection bed to better visualize this area.  If there is residual disease she will require a more aggressive resection.  If nothing is obvious seen on MRI, she will likely require resection to ensure completely negative margins.  Also discussed possible observation this setting.  I will see her back once we have the results of the MRI.  She is agreeable to this.  All her questions were answered.

## 2024-04-19 ENCOUNTER — APPOINTMENT (OUTPATIENT)
Dept: LAB | Facility: CLINIC | Age: 64
End: 2024-04-19
Payer: COMMERCIAL

## 2024-04-19 DIAGNOSIS — C49.9 SARCOMA (HCC): ICD-10-CM

## 2024-04-19 LAB
BUN SERPL-MCNC: 17 MG/DL (ref 5–25)
CREAT SERPL-MCNC: 0.69 MG/DL (ref 0.6–1.3)
GFR SERPL CREATININE-BSD FRML MDRD: 91 ML/MIN/1.73SQ M

## 2024-04-19 PROCEDURE — 36415 COLL VENOUS BLD VENIPUNCTURE: CPT

## 2024-04-19 PROCEDURE — 82565 ASSAY OF CREATININE: CPT

## 2024-04-19 PROCEDURE — 84520 ASSAY OF UREA NITROGEN: CPT

## 2024-04-23 ENCOUNTER — HOSPITAL ENCOUNTER (OUTPATIENT)
Dept: MRI IMAGING | Facility: HOSPITAL | Age: 64
Discharge: HOME | End: 2024-04-23
Attending: SURGERY
Payer: COMMERCIAL

## 2024-04-23 DIAGNOSIS — C49.9 SARCOMA (HCC): ICD-10-CM

## 2024-04-23 PROCEDURE — 71552 MRI CHEST W/O & W/DYE: CPT

## 2024-04-23 PROCEDURE — A9585 GADOBUTROL INJECTION: HCPCS | Performed by: SURGERY

## 2024-04-23 RX ORDER — GADOBUTROL 604.72 MG/ML
8 INJECTION INTRAVENOUS
Status: COMPLETED | OUTPATIENT
Start: 2024-04-23 | End: 2024-04-23

## 2024-04-23 RX ADMIN — GADOBUTROL 8 ML: 604.72 INJECTION INTRAVENOUS at 18:07

## 2024-05-09 ENCOUNTER — OFFICE VISIT (OUTPATIENT)
Dept: SURGICAL ONCOLOGY | Facility: CLINIC | Age: 64
End: 2024-05-09
Payer: COMMERCIAL

## 2024-05-09 VITALS
HEIGHT: 63 IN | TEMPERATURE: 97.7 F | SYSTOLIC BLOOD PRESSURE: 132 MMHG | RESPIRATION RATE: 18 BRPM | OXYGEN SATURATION: 98 % | WEIGHT: 184 LBS | DIASTOLIC BLOOD PRESSURE: 92 MMHG | HEART RATE: 65 BPM | BODY MASS INDEX: 32.6 KG/M2

## 2024-05-09 DIAGNOSIS — D17.1 LIPOMA OF TORSO: Primary | ICD-10-CM

## 2024-05-09 PROCEDURE — 99214 OFFICE O/P EST MOD 30 MIN: CPT | Performed by: SURGERY

## 2024-05-09 NOTE — LETTER
May 9, 2024     Subhash Calvin DO  [address]  [facility] [address]    Patient: Brigid Linn   YOB: 1960   Date of Visit: 5/9/2024       Dear Dr. Calvin:    Thank you for referring Brigid Linn to me for evaluation. Below are my notes for this consultation.    If you have questions, please do not hesitate to call me. I look forward to following your patient along with you.         Sincerely,        Eduard Beckwith MD        CC: MD Eduard Naranjo MD  5/9/2024  3:44 PM  Sign when Signing Visit               Surgical Oncology Follow Up       [facility] [place]  [address] [address]  [phone]    Brigid Linn  1960  [id number]  [facility]  [address] [address]  [phone]    Diagnoses and all orders for this visit:    Lipoma of torso  -     BUN; Future  -     Creatinine, serum; Future  -     MRI chest wall wo and w contrast; Future        Chief Complaint   Patient presents with   • Follow-up       Return in about 6 months (around 11/9/2024) for Office Visit, Imaging - See orders, with Gloria.      Oncology History    No history exists.       Staging: Atypical lipomatous tumor.  Indeterminant margins, March 2024  Treatment history: Marginal excision of atypical lipomatous tumor, March 2024  Current treatment:    Disease status: MAX    History of Present Illness: Patient returns in follow-up. MRI from April 23, 2024 reveals no obvious residual mass.  There is a small fluid collection in the left posterior upper back consistent with a seroma.  I personally reviewed the films.    Review of Systems  Complete ROS Surg Onc:   Complete ROS Surg Onc:   Constitutional: The patient denies new or recent history of general fatigue, no recent weight loss, no change in appetite.   Eyes: No complaints of visual  problems, no scleral icterus.   ENT: no complaints of ear pain, no hoarseness, no difficulty swallowing,  no tinnitus and no new masses in head, oral cavity, or neck.   Cardiovascular: No complaints of chest pain, no palpitations, no ankle edema.   Respiratory: No complaints of shortness of breath, no cough.   Gastrointestinal: No complaints of jaundice, no bloody stools, no pale stools.   Genitourinary: No complaints of dysuria, no hematuria, no nocturia, no frequent urination, no urethral discharge.   Musculoskeletal: No complaints of weakness, paralysis, joint stiffness or arthralgias.  Integumentary: No complaints of rash, no new lesions.   Neurological: No complaints of convulsions, no seizures, no dizziness.   Hematologic/Lymphatic: No complaints of easy bruising.   Endocrine:  No hot or cold intolerance.  No polydipsia, polyphagia, or polyuria.  Allergy/immunology:  No environmental allergies.  No food allergies.  Not immunocompromised.  Skin:  No pallor or rash.  No wound.        Patient Active Problem List   Diagnosis   • GERD (gastroesophageal reflux disease)   • Essential hypertension   • Stroke-like symptoms   • Intracranial aneurysm   • Lipoma of torso     Past Medical History:   Diagnosis Date   • Aneurysm (HCC)     inoperable   • Calculus of gallbladder with chronic cholecystitis without obstruction 07/18/2022   • Colon polyp    • GERD (gastroesophageal reflux disease)    • Hypertension    • Squamous cell skin cancer 11/08/2023    left medial distal pretibial region, mohs   • Urinary tract bacterial infections      Past Surgical History:   Procedure Laterality Date   • ABDOMINAL SURGERY     • COLONOSCOPY  2021   • DILATION AND CURETTAGE, DIAGNOSTIC / THERAPEUTIC     • FL CYSTOGRAM  03/16/2020   • HYSTERECTOMY     • HYSTERECTOMY  2020   • IR CEREBRAL ANGIOGRAPHY  02/05/2021   • IR CEREBRAL ANGIOGRAPHY / INTERVENTION  05/14/2021   • MOHS SURGERY Left 12/27/2023    SCC left medial distal pretibial  region, Dr Mauricio   • FL EXC B9 LESION MRGN XCP SK TG T/A/L 3.1-4.0 CM Left 3/13/2024    Procedure: EXCISION  MASS OF BACK;  Surgeon: Francois Delgado MD;  Location:  MAIN OR;  Service: Plastics   • FL LAPAROSCOPY SURG CHOLECYSTECTOMY N/A 2022    Procedure: CHOLECYSTECTOMY LAPAROSCOPIC;  Surgeon: Irwin Pope MD;  Location: [facility];  Service: General   • TONSILLECTOMY     • TUBAL LIGATION       Family History   Problem Relation Age of Onset   • Hypertension Mother    • Hypertension Father    • No Known Problems Sister      Social History     Socioeconomic History   • Marital status: /Civil Union     Spouse name: Not on file   • Number of children: Not on file   • Years of education: Not on file   • Highest education level: Not on file   Occupational History     Employer: [employer]   Tobacco Use   • Smoking status: Former     Current packs/day: 0.00     Types: Cigarettes     Quit date: 4/15/2007     Years since quittin.0   • Smokeless tobacco: Never   Vaping Use   • Vaping status: Never Used   Substance and Sexual Activity   • Alcohol use: Yes     Comment: once month   • Drug use: Not Currently     Types: Marijuana     Comment: Marijuana at 15 and 16 years old.    • Sexual activity: Not Currently   Other Topics Concern   • Not on file   Social History Narrative   • Not on file     Social Determinants of Health     Financial Resource Strain: Not on file   Food Insecurity: Not on file   Transportation Needs: Not on file   Physical Activity: Not on file   Stress: Not on file   Social Connections: Not on file   Intimate Partner Violence: Not on file   Housing Stability: Not on file       Current Outpatient Medications:   •  Apple Cider Vinegar-Radha 500-5 MG CHEW, Chew, Disp: , Rfl:   •  Bioflavonoid Products (Vitamin C) CHEW, Chew, Disp: , Rfl:   •  cholecalciferol (VITAMIN D3) 1,000 units tablet, Take 1,000 Units by mouth daily, Disp: , Rfl:   •  cyanocobalamin (VITAMIN B-12) 1000  MCG tablet, Take 1,000 mcg by mouth as needed , Disp: , Rfl:   •  metoprolol succinate (TOPROL-XL) 50 mg 24 hr tablet, Take 25 mg by mouth daily , Disp: , Rfl:   •  Multiple Vitamins-Minerals (MULTIVITAMIN ADULTS 50+ PO), Take 1 tablet by mouth daily -takes beet root and green vegg supplement, Disp: , Rfl:   •  Multiple Vitamins-Minerals (ZINC PO), Take by mouth, Disp: , Rfl:   •  Nutritional Supplements (JUICE PLUS FIBRE PO), Take 3 capsules by mouth 2 (two) times a day, Disp: , Rfl:   •  valsartan (DIOVAN) 40 mg tablet, Take 1 tablet (40 mg total) by mouth daily, Disp: 30 tablet, Rfl: 0  •  amLODIPine (NORVASC) 5 mg tablet, Take 5 mg by mouth daily (Patient not taking: Reported on 12/27/2023), Disp: , Rfl:   •  aspirin (ECOTRIN LOW STRENGTH) 81 mg EC tablet, Take 81 mg by mouth daily (Patient not taking: Reported on 12/27/2023), Disp: , Rfl:   •  oxyCODONE-acetaminophen (PERCOCET) 5-325 mg per tablet, Take 1 tablet by mouth every 4 (four) hours as needed for moderate pain for up to 12 doses Max Daily Amount: 6 tablets (Patient not taking: Reported on 8/29/2022), Disp: 12 tablet, Rfl: 0  No Known Allergies  Vitals:    05/09/24 1526   BP: 132/92   Pulse: 65   Resp: 18   Temp: 97.7 °F (36.5 °C)   SpO2: 98%       Physical Exam  Constitutional: General appearance: The Patient is well-developed and well-nourished who appears the stated age in no acute distress. Patient is pleasant and talkative.     HEENT:  Normocephalic.  Sclerae are anicteric. Mucous membranes are moist. Neck is supple without adenopathy.      Extremities: There is no clubbing or cyanosis. There is no edema.  Symmetric.  Neuro: Grossly nonfocal. Gait is normal.      Skin: Warm, anicteric.  Incision is healing well.  No obvious seroma or masses.  Psych:  Patient is pleasant and talkative.  Breasts:        Pathology:  [unfilled]    Labs:      Imaging  MRI chest wall wo and w contrast    Result Date: 4/26/2024  Narrative: MRI CHEST WALL WO AND W  CONTRAST INDICATION:   C49.9: Malignant neoplasm of connective and soft tissue, unspecified. Dr. Beckwith's note from 4/16/2024 reviewed. Patient had a 5 cm lipomatous tumor resected. Mass was well encapsulated but under the latissimus dorsi and attached to the serratus anterior muscle. Pathology revealed atypical lipomatous tumor. Margin cannot be determined. This MRI was performed to evaluate resection site and assess for any residual disease. Date of surgery was 3/13/2024. COMPARISON:  None. TECHNIQUE:  Multiplanar/multisequence MR of the chest wall was performed with and without IV contrast. IV Contrast:  8 mL of Gadobutrol injection (SINGLE-DOSE) FINDINGS: SUBCUTANEOUS TISSUES: Normal BONES:  No fracture, dislocation or destructive osseous lesion. ARTICULAR SURFACES:  Normal. VISUALIZED MUSCULATURE:  Unremarkable. No evidence of residual mass in the left chest wall, which based on surgical report was deep to the latissimus dorsi, and attached to the serratus anterior (series 4 images 19-34.) There is a small 4.6 x 1.7 x 3.7 cm subcutaneous fluid collection in the left posterior upper back, consistent with postoperative seroma or lymphocele (series 10 images 18-25.) LUNGS AND MEDIASTINUM: Limited evaluation by MRI technique. No gross abnormalities. OTHER SOFT TISSUES:  There is a small 4.6 x 1.7 x 3.7 cm subcutaneous fluid collection in the left posterior upper back extending into the trapezius muscle, consistent with postoperative seroma or lymphocele (series 10 images 18-25.)     Impression: There is a small 4.6 x 1.7 x 3.7 cm subcutaneous fluid collection in the left posterior upper back extending into the trapezius muscle, consistent with postoperative seroma or lymphocele (series 10 images 18-25.) No evidence of residual mass in the left chest wall, which based on surgical report was deep to the latissimus dorsi, and attached to the serratus anterior (series 4 images 19-34.) Workstation performed: [id number]      I personally reviewed and interpreted the above laboratory and imaging data.    Discussion/Summary: 63-year-old female status post excision of an atypical lipomatous tumor.  This was marginally excised and margins cannot be determined.  The lesion was 5 cm in size and below the latissimus dorsi.  The MRI reveals a seroma but no obvious masses.  We discussed treatment options including reexcision to ensure there are completely negative margins.  She is actually just interested in observation since the MRI shows no masses.  I discussed that these types of tumors are likely to recur locally and if there are microscopically positive margins, there is a reasonable chance this will recur at some point.  She continues to wish to pursue her course of observation.  I will repeat her MRI in 6 months and see her back at that time for another clinical exam.  She is agreeable to this.  All her questions were answered.

## 2024-05-09 NOTE — PROGRESS NOTES
Surgical Oncology Follow Up       [facility] [place]  [address] [facility] [address]  [phone]    AudreyDeepthi Linn  1960  [id number]  [facility] [place]  [address] [facility] [address]  [phone]    Diagnoses and all orders for this visit:    Lipoma of torso  -     BUN; Future  -     Creatinine, serum; Future  -     MRI chest wall wo and w contrast; Future        Chief Complaint   Patient presents with    Follow-up       Return in about 6 months (around 11/9/2024) for Office Visit, Imaging - See orders, with Gloria.      Oncology History    No history exists.       Staging: Atypical lipomatous tumor.  Indeterminant margins, March 2024  Treatment history: Marginal excision of atypical lipomatous tumor, March 2024  Current treatment:    Disease status: MAX    History of Present Illness: Patient returns in follow-up. MRI from April 23, 2024 reveals no obvious residual mass.  There is a small fluid collection in the left posterior upper back consistent with a seroma.  I personally reviewed the films.    Review of Systems  Complete ROS Surg Onc:   Complete ROS Surg Onc:   Constitutional: The patient denies new or recent history of general fatigue, no recent weight loss, no change in appetite.   Eyes: No complaints of visual problems, no scleral icterus.   ENT: no complaints of ear pain, no hoarseness, no difficulty swallowing,  no tinnitus and no new masses in head, oral cavity, or neck.   Cardiovascular: No complaints of chest pain, no palpitations, no ankle edema.   Respiratory: No complaints of shortness of breath, no cough.   Gastrointestinal: No complaints of jaundice, no bloody stools, no pale stools.   Genitourinary: No complaints of dysuria, no hematuria, no nocturia, no frequent urination, no urethral discharge.   Musculoskeletal: No complaints  of weakness, paralysis, joint stiffness or arthralgias.  Integumentary: No complaints of rash, no new lesions.   Neurological: No complaints of convulsions, no seizures, no dizziness.   Hematologic/Lymphatic: No complaints of easy bruising.   Endocrine:  No hot or cold intolerance.  No polydipsia, polyphagia, or polyuria.  Allergy/immunology:  No environmental allergies.  No food allergies.  Not immunocompromised.  Skin:  No pallor or rash.  No wound.        Patient Active Problem List   Diagnosis    GERD (gastroesophageal reflux disease)    Essential hypertension    Stroke-like symptoms    Intracranial aneurysm    Lipoma of torso     Past Medical History:   Diagnosis Date    Aneurysm (HCC)     inoperable    Calculus of gallbladder with chronic cholecystitis without obstruction 07/18/2022    Colon polyp     GERD (gastroesophageal reflux disease)     Hypertension     Squamous cell skin cancer 11/08/2023    left medial distal pretibial region, mohs    Urinary tract bacterial infections      Past Surgical History:   Procedure Laterality Date    ABDOMINAL SURGERY      COLONOSCOPY  2021    DILATION AND CURETTAGE, DIAGNOSTIC / THERAPEUTIC      FL CYSTOGRAM  03/16/2020    HYSTERECTOMY      HYSTERECTOMY  2020    IR CEREBRAL ANGIOGRAPHY  02/05/2021    IR CEREBRAL ANGIOGRAPHY / INTERVENTION  05/14/2021    MOHS SURGERY Left 12/27/2023    SCC left medial distal pretibial region, Dr Mauricio    WA EXC B9 LESION MRGN XCP SK TG T/A/L 3.1-4.0 CM Left 3/13/2024    Procedure: EXCISION  MASS OF BACK;  Surgeon: Francois Delgado MD;  Location:  MAIN OR;  Service: Plastics    WA LAPAROSCOPY SURG CHOLECYSTECTOMY N/A 08/12/2022    Procedure: CHOLECYSTECTOMY LAPAROSCOPIC;  Surgeon: Irwin Pope MD;  [facility]: [facility];  Service: General    TONSILLECTOMY      TUBAL LIGATION       Family History   Problem Relation Age of Onset    Hypertension Mother     Hypertension Father     No Known Problems Sister      Social History      Socioeconomic History    Marital status: /Civil Union     Spouse name: Not on file    Number of children: Not on file    Years of education: Not on file    Highest education level: Not on file   Occupational History     Employer: [employer]   Tobacco Use    Smoking status: Former     Current packs/day: 0.00     Types: Cigarettes     Quit date: 4/15/2007     Years since quittin.0    Smokeless tobacco: Never   Vaping Use    Vaping status: Never Used   Substance and Sexual Activity    Alcohol use: Yes     Comment: once month    Drug use: Not Currently     Types: Marijuana     Comment: Marijuana at 15 and 16 years old.     Sexual activity: Not Currently   Other Topics Concern    Not on file   Social History Narrative    Not on file     Social Determinants of Health     Financial Resource Strain: Not on file   Food Insecurity: Not on file   Transportation Needs: Not on file   Physical Activity: Not on file   Stress: Not on file   Social Connections: Not on file   Intimate Partner Violence: Not on file   Housing Stability: Not on file       Current Outpatient Medications:     Apple Cider Vinegar-Ginger 500-5 MG CHEW, Chew, Disp: , Rfl:     Bioflavonoid Products (Vitamin C) CHEW, Chew, Disp: , Rfl:     cholecalciferol (VITAMIN D3) 1,000 units tablet, Take 1,000 Units by mouth daily, Disp: , Rfl:     cyanocobalamin (VITAMIN B-12) 1000 MCG tablet, Take 1,000 mcg by mouth as needed , Disp: , Rfl:     metoprolol succinate (TOPROL-XL) 50 mg 24 hr tablet, Take 25 mg by mouth daily , Disp: , Rfl:     Multiple Vitamins-Minerals (MULTIVITAMIN ADULTS 50+ PO), Take 1 tablet by mouth daily -takes beet root and green vegg supplement, Disp: , Rfl:     Multiple Vitamins-Minerals (ZINC PO), Take by mouth, Disp: , Rfl:     Nutritional Supplements (JUICE PLUS FIBRE PO), Take 3 capsules by mouth 2 (two) times a day, Disp: , Rfl:     valsartan (DIOVAN) 40 mg tablet, Take 1 tablet (40 mg total) by mouth daily, Disp: 30  tablet, Rfl: 0    amLODIPine (NORVASC) 5 mg tablet, Take 5 mg by mouth daily (Patient not taking: Reported on 12/27/2023), Disp: , Rfl:     aspirin (ECOTRIN LOW STRENGTH) 81 mg EC tablet, Take 81 mg by mouth daily (Patient not taking: Reported on 12/27/2023), Disp: , Rfl:     oxyCODONE-acetaminophen (PERCOCET) 5-325 mg per tablet, Take 1 tablet by mouth every 4 (four) hours as needed for moderate pain for up to 12 doses Max Daily Amount: 6 tablets (Patient not taking: Reported on 8/29/2022), Disp: 12 tablet, Rfl: 0  No Known Allergies  Vitals:    05/09/24 1526   BP: 132/92   Pulse: 65   Resp: 18   Temp: 97.7 °F (36.5 °C)   SpO2: 98%       Physical Exam  Constitutional: General appearance: The Patient is well-developed and well-nourished who appears the stated age in no acute distress. Patient is pleasant and talkative.     HEENT:  Normocephalic.  Sclerae are anicteric. Mucous membranes are moist. Neck is supple without adenopathy.      Extremities: There is no clubbing or cyanosis. There is no edema.  Symmetric.  Neuro: Grossly nonfocal. Gait is normal.      Skin: Warm, anicteric.  Incision is healing well.  No obvious seroma or masses.  Psych:  Patient is pleasant and talkative.  Breasts:        Pathology:  [unfilled]    Labs:      Imaging  MRI chest wall wo and w contrast    Result Date: 4/26/2024  Narrative: MRI CHEST WALL WO AND W CONTRAST INDICATION:   C49.9: Malignant neoplasm of connective and soft tissue, unspecified. Dr. Beckwith's note from 4/16/2024 reviewed. Patient had a 5 cm lipomatous tumor resected. Mass was well encapsulated but under the latissimus dorsi and attached to the serratus anterior muscle. Pathology revealed atypical lipomatous tumor. Margin cannot be determined. This MRI was performed to evaluate resection site and assess for any residual disease. Date of surgery was 3/13/2024. COMPARISON:  None. TECHNIQUE:  Multiplanar/multisequence MR of the chest wall was performed with and without IV  contrast. IV Contrast:  8 mL of Gadobutrol injection (SINGLE-DOSE) FINDINGS: SUBCUTANEOUS TISSUES: Normal BONES:  No fracture, dislocation or destructive osseous lesion. ARTICULAR SURFACES:  Normal. VISUALIZED MUSCULATURE:  Unremarkable. No evidence of residual mass in the left chest wall, which based on surgical report was deep to the latissimus dorsi, and attached to the serratus anterior (series 4 images 19-34.) There is a small 4.6 x 1.7 x 3.7 cm subcutaneous fluid collection in the left posterior upper back, consistent with postoperative seroma or lymphocele (series 10 images 18-25.) LUNGS AND MEDIASTINUM: Limited evaluation by MRI technique. No gross abnormalities. OTHER SOFT TISSUES:  There is a small 4.6 x 1.7 x 3.7 cm subcutaneous fluid collection in the left posterior upper back extending into the trapezius muscle, consistent with postoperative seroma or lymphocele (series 10 images 18-25.)     Impression: There is a small 4.6 x 1.7 x 3.7 cm subcutaneous fluid collection in the left posterior upper back extending into the trapezius muscle, consistent with postoperative seroma or lymphocele (series 10 images 18-25.) No evidence of residual mass in the left chest wall, which based on surgical report was deep to the latissimus dorsi, and attached to the serratus anterior (series 4 images 19-34.) Workstation performed: [id number]     I personally reviewed and interpreted the above laboratory and imaging data.    Discussion/Summary: 63-year-old female status post excision of an atypical lipomatous tumor.  This was marginally excised and margins cannot be determined.  The lesion was 5 cm in size and below the latissimus dorsi.  The MRI reveals a seroma but no obvious masses.  We discussed treatment options including reexcision to ensure there are completely negative margins.  She is actually just interested in observation since the MRI shows no masses.  I discussed that these types of tumors are likely to  recur locally and if there are microscopically positive margins, there is a reasonable chance this will recur at some point.  She continues to wish to pursue her course of observation.  I will repeat her MRI in 6 months and see her back at that time for another clinical exam.  She is agreeable to this.  All her questions were answered.

## 2024-10-16 NOTE — LETTER
April 16, 2024     Subhash Calvin DO  [address]  [facility] [address]    Patient: Brigid Linn   YOB: 1960   Date of Visit: 4/16/2024       Dear Dr. Calvin:    Thank you for referring Brigid Linn to me for evaluation. Below are my notes for this consultation.    If you have questions, please do not hesitate to call me. I look forward to following your patient along with you.         Sincerely,        Eduard Beckwith MD        CC: MD Eduard Naranjo MD  4/16/2024  3:18 PM  Sign when Signing Visit               Surgical Oncology Consult       [facility] [place]  [address] [facility] [id number]  [phone]    Brigid Linn  1960  [id number]  [facility] [place]  [facility] [address]  [phone]    Diagnoses and all orders for this visit:    Lipoma of torso    Sarcoma (HCC)  -     BUN; Future  -     Creatinine, serum; Future  -     MRI chest wall wo and w contrast; Future        Chief Complaint   Patient presents with   • Consult       Return in about 3 weeks (around 5/7/2024) for Office Visit, Imaging - See orders.    Oncology History    No history exists.       History of Present Illness: 63-year-old female who had what she felt was a lipoma by her left scapula.  Over the last 5 to 7 years she noticed this was increasing in size, but then it stopped growing.  Because it stopped growing she felt this was a lipoma, but had it excised because she was noticing some discomfort with movement.  She then had excision.  This was well encapsulated but under the latissimus dorsi and attached to the serratus anterior muscle.  This was excised using sharp and blunt dissection cording to the operative report.  Pathology revealed atypical lipomatous tumor.  Margins could not be determined.  He comes in now to discuss this.   She is no longer feeling any masses.  She does have a history of another lipoma.  No shortness of breath.    Review of Systems  Complete ROS Surg Onc:   Constitutional: The patient denies new or recent history of general fatigue, no recent weight loss, no change in appetite.   Eyes: No complaints of visual problems, no scleral icterus.   ENT: no complaints of ear pain, no hoarseness, no difficulty swallowing,  no tinnitus and no new masses in head, oral cavity, or neck.   Cardiovascular: No complaints of chest pain, no palpitations, no ankle edema.   Respiratory: No complaints of shortness of breath, no cough.   Gastrointestinal: No complaints of jaundice, no bloody stools, no pale stools.   Genitourinary: No complaints of dysuria, no hematuria, no nocturia, no frequent urination, no urethral discharge.   Musculoskeletal: No complaints of weakness, paralysis, joint stiffness or arthralgias.  Integumentary: No complaints of rash, no new lesions.   Neurological: No complaints of convulsions, no seizures, no dizziness.   Hematologic/Lymphatic: No complaints of easy bruising.   Endocrine:  No hot or cold intolerance.  No polydipsia, polyphagia, or polyuria.  Allergy/immunology:  No environmental allergies.  No food allergies.  Not immunocompromised.  Skin:  No pallor or rash.  No wound.          Patient Active Problem List   Diagnosis   • GERD (gastroesophageal reflux disease)   • Essential hypertension   • Stroke-like symptoms   • Intracranial aneurysm   • Lipoma of torso     Past Medical History:   Diagnosis Date   • Aneurysm (HCC)     inoperable   • Calculus of gallbladder with chronic cholecystitis without obstruction 07/18/2022   • Colon polyp    • GERD (gastroesophageal reflux disease)    • Hypertension    • Squamous cell skin cancer 11/08/2023    left medial distal pretibial region, mohs   • Urinary tract bacterial infections      Past Surgical History:   Procedure Laterality Date   • ABDOMINAL SURGERY     •  COLONOSCOPY     • DILATION AND CURETTAGE, DIAGNOSTIC / THERAPEUTIC     • FL CYSTOGRAM  2020   • HYSTERECTOMY     • HYSTERECTOMY     • IR CEREBRAL ANGIOGRAPHY  2021   • IR CEREBRAL ANGIOGRAPHY / INTERVENTION  2021   • MOHS SURGERY Left 2023    SCC left medial distal pretibial region, Dr Mauricio   • NV EXC B9 LESION MRGN XCP SK TG T/A/L 3.1-4.0 CM Left 3/13/2024    Procedure: EXCISION  MASS OF BACK;  Surgeon: Francois Delgado MD;  Location:  MAIN OR;  Service: Plastics   • NV LAPAROSCOPY SURG CHOLECYSTECTOMY N/A 2022    Procedure: CHOLECYSTECTOMY LAPAROSCOPIC;  Surgeon: Irwin Pope MD;  Location: [facility];  Service: General   • TONSILLECTOMY     • TUBAL LIGATION       Family History   Problem Relation Age of Onset   • Hypertension Mother    • Hypertension Father    • No Known Problems Sister      Social History     Socioeconomic History   • Marital status: /Civil Union     Spouse name: Not on file   • Number of children: Not on file   • Years of education: Not on file   • Highest education level: Not on file   Occupational History     Employer: [employer]   Tobacco Use   • Smoking status: Former     Current packs/day: 0.00     Types: Cigarettes     Quit date: 4/15/2007     Years since quittin.0   • Smokeless tobacco: Never   Vaping Use   • Vaping status: Never Used   Substance and Sexual Activity   • Alcohol use: Yes     Comment: once month   • Drug use: Not Currently     Types: Marijuana     Comment: Marijuana at 15 and 16 years old.    • Sexual activity: Not Currently   Other Topics Concern   • Not on file   Social History Narrative   • Not on file     Social Determinants of Health     Financial Resource Strain: Not on file   Food Insecurity: Not on file   Transportation Needs: Not on file   Physical Activity: Not on file   Stress: Not on file   Social Connections: Not on file   Intimate Partner Violence: Not on file   Housing Stability: Not on file        Current Outpatient Medications:   •  Apple Cider Vinegar-Ginger 500-5 MG CHEW, Chew, Disp: , Rfl:   •  Bioflavonoid Products (Vitamin C) CHEW, Chew, Disp: , Rfl:   •  cholecalciferol (VITAMIN D3) 1,000 units tablet, Take 1,000 Units by mouth daily, Disp: , Rfl:   •  cyanocobalamin (VITAMIN B-12) 1000 MCG tablet, Take 1,000 mcg by mouth as needed , Disp: , Rfl:   •  metoprolol succinate (TOPROL-XL) 50 mg 24 hr tablet, Take 25 mg by mouth daily , Disp: , Rfl:   •  Multiple Vitamins-Minerals (MULTIVITAMIN ADULTS 50+ PO), Take 1 tablet by mouth daily -takes beet root and green vegg supplement, Disp: , Rfl:   •  Multiple Vitamins-Minerals (ZINC PO), Take by mouth, Disp: , Rfl:   •  Nutritional Supplements (JUICE PLUS FIBRE PO), Take 3 capsules by mouth 2 (two) times a day, Disp: , Rfl:   •  valsartan (DIOVAN) 40 mg tablet, Take 1 tablet (40 mg total) by mouth daily, Disp: 30 tablet, Rfl: 0  •  amLODIPine (NORVASC) 5 mg tablet, Take 5 mg by mouth daily (Patient not taking: Reported on 12/27/2023), Disp: , Rfl:   •  aspirin (ECOTRIN LOW STRENGTH) 81 mg EC tablet, Take 81 mg by mouth daily (Patient not taking: Reported on 12/27/2023), Disp: , Rfl:   •  oxyCODONE-acetaminophen (PERCOCET) 5-325 mg per tablet, Take 1 tablet by mouth every 4 (four) hours as needed for moderate pain for up to 12 doses Max Daily Amount: 6 tablets (Patient not taking: Reported on 8/29/2022), Disp: 12 tablet, Rfl: 0  No Known Allergies  Vitals:    04/16/24 1453   BP: 134/80   Pulse: 72   Temp: 98.2 °F (36.8 °C)   SpO2: 97%       Physical Exam   Constitutional: General appearance: The Patient is well-developed and well-nourished who appears the stated age in no acute distress. Patient is pleasant and talkative.     HEENT:  Normocephalic.  Sclerae are anicteric. Mucous membranes are moist. Neck is supple without adenopathy. No JVD.     Chest: The lungs are clear to auscultation.     Cardiac: Heart is regular rate.     Abdomen: Abdomen is soft,  non-tender, non-distended and without masses.     Extremities: There is no clubbing or cyanosis. There is no edema.  Symmetric.  Neuro: Grossly nonfocal. Gait is normal.     Lymphatic: No evidence of cervical adenopathy bilaterally.   No evidence of axillary adenopathy on the left.    Skin: Warm, anicteric.  Incision is C/D/I.  No obvious recurrence.    Psych:  Patient is pleasant and talkative.  Breasts:      Pathology:  Final Diagnosis   A. Soft Tissue mass, LIPOMA OF LEFT SCAPULA, excision:  - Atypical lipomatous tumor, 8 cm in greatest gross dimension.     -- Confirmed by Positive/Amplified MDM2 fluorescence in-situ hybridization (FISH).         ** See PMR for complete scanned report (Gen[facility] Specimen ID: [id number];             interpreted by ISSAC Tolentino MD PhD).      -- Involvement/entrapment of skeletal muscle fibers and scattered foci of fat necrosis identified.      -- CD34 immunostain positive spindle cells.      -- FNCLCC Grade 1 of 3 (tumor differentiation score 1 of 3; mitotic count/0 mitoses/10 HPF,        score 1 of 3; < 50% tumor necrosis, score 1 of 2; total score 3 of 8).      -- Lymphovascular invasion: Not identified.      -- Margins: Cannot be determined; mature adipose tissue/possible atypical lipomatous tumor         extends to examined unoriented margins. See Note.      B. Skin Lesion, LESION OF LEFT UPPER SCAPULA, excision:  - Benign lichenoid keratosis.    - Negative for malignancy.       -- SOX10 and Melan-A immunostains confirm no atypical melanocytic proliferation.         Comments:   This is an appended report. These results have been appended to a previously preliminary verified report.      Electronically signed by Conchita Garcia MD on 3/26/2024 at  6:31 PM   Preliminary Diagnosis   A. Soft Tissue, Lipoma, LIPOMA OF LEFT SCAPULA, excision:  - Well-differentiated lipomatous neoplasm with entrapped skeletal muscle fibers consistent      with intramuscular component.    -- Associated  spindle cells CD34 immunostain positive.     -- Foci of fat necrosis.     -- MDM2 FISH pending. See Preliminary Note.      B. Skin Lesion, LESION OF LEFT UPPER SCAPULA, excision:  - Benign lichenoid keratosis.    - Negative for malignancy.       -- SOX10 and Melan-A immunostains confirm no atypical melanocytic proliferation.     Preliminary Note: For specimen A, to completely exclude an atypical lipomatous tumor, MDM2 FISH is pending. A final report will be issued when the result is available. [employer] message sent to Dr. RORO Delgado on 3/20/24 @ 0930 hours. A copy of the preliminary report is also sent.    Preliminary result electronically signed by Conchita Garcia MD on 3/20/2024 at  9:48 AM   Note    Intradepartmental consultation (IK) agrees with the final diagnosis for specimen A. [employer] message sent to Dr. RORO Delgado on 3/26/24 @ 1615 hours. A copy of the final report is also sent.      It is difficult to exclude margin involvement by atypical lipomatous tumor given the well-differentiated nature of the tumor and similarity to benign mature adipose tissue.      Best representative tumor block: A3.    Comment: This is an appended report. These results have been appended to a previously preliminary verified report.   Additional Information    All reported additional testing was performed with appropriately reactive controls.  These tests were developed and their performance characteristics determined by [facility] or appropriate performing facility, though some tests may be performed on tissues which have not been validated for performance characteristics (such as staining performed on alcohol exposed cell blocks and decalcified tissues).  Results should be interpreted with caution and in the context of the patients’ clinical condition. These tests may not be cleared or approved by the U.S. Food and Drug Administration, though the FDA has determined that such clearance or approval  is not necessary. These tests are used for clinical purposes and they should not be regarded as investigational or for research. This laboratory has been approved by [place] [id number], designated as a high-complexity laboratory and is qualified to perform these tests.  Interpretation performed at [facility], [address] [address].   Synoptic Checklist   SOFT TISSUE: Resection   8th Edition - Protocol posted: 6/30/2021SOFT TISSUE: RESECTION - All Specimens  CLINICAL   Preresection Treatment  No known preresection therapy   SPECIMEN   Procedure  Marginal resection   TUMOR   Tumor Focality  Unifocal   Tumor Site  Trunk and extremities: Left scapula (Back/shoulder)   Tumor Size  Greatest Dimension (Centimeters): 8 cm   Histologic Type (WHO)  Atypical lipomatous tumor   Histologic Grade (FNCLCC)  Grade 1   Mitotic Rate  0  mitoses per 10 high-power fields (HPF)   Necrosis (macroscopic or microscopic)  Present   Extent of Necrosis  < 50  %   Treatment Effect  No known presurgical therapy   Lymphovascular Invasion  Not identified   MARGINS   Margin Status  Cannot be determined: mature adipose tissue/possible atypical lipomatous tumor present at inked (unoriented) margins.     REGIONAL LYMPH NODES   Regional Lymph Node Status  Not applicable (no regional lymph nodes submitted or found)   PATHOLOGIC STAGE CLASSIFICATION (pTNM, AJCC 8th Edition)   Reporting of pT, pN, and (when applicable) pM categories is based on information available to the pathologist at the time the report is issued. As per the AJCC (Chapter 1, 8th Ed.) it is the managing physician’s responsibility to establish the final pathologic stage based upon all pertinent information, including but potentially not limited to this pathology report.   Pathologic Stage Classification  Histologic type appropriate for staging        pT Category  pT2   pN Category  pN not assigned (no nodes submitted or found)   SPECIAL STUDIES   Cytogenetics  Not performed    Molecular Pathology  MDM2 FISH Positive/Amplified (Gen[facility] Specimen ID: [id number])   Comment(s)  8th ed. AJCC Pathologic Prognostic Stage Group: at least Stage IB- pT2, pN0, G1.          Labs:      Imaging  DXA BONE DENSITY SCAN STANDARD 2 SITES    Result Date: 2024  Narrative: PROCEDURE: DXA bone mineral density examination was performed with a [employer] scanner. CLINICAL HISTORY: This is a 63 year old postmenopausal female that requires a bone density assessment. BONE DENSITY: Total Lumbar Spine: 0.798 grams/cm2 which correlates with a Z-score -0.4 and a T-score -2.3, classification of Osteopenia. Femoral Neck (Left): 0.620 grams/cm2 which correlates with a Z-score -0.5 and a T-score -2.1, classification of Osteopenia. Total Hip (Left): 0.772 grams/cm2 which correlates with a Z-score -0.1 and a T-score -1.4, classification of Osteopenia. WHO Fracture Assessment Tool (FRAX) estimates 10- year fracture risk as follows: Major Osteoporotic Fracture: 10% Hip Fracture: 1.6% Interpretation: The patient has osteopenia of the lumbar spine and left hip as determined by WHO criteria. Based on the results of the patient's bone density assessment, the risk of future fracture increases approximately two fold for each 1.0 SD decrease in T-score. Assessment: A repeat bone density assessment should be considered in two years. The National Osteoporosis Foundation Recommends That FDA Approved Medical Therapies Be Considered In Postmenopausal Women And Men Age Greater Than Or Equal To 50 Years With The Followin.  Hip Or Vertebral Fracture; 2.  T-Score Of Less Than Or Equal To -2.5 At The Spine Or Hip; 3.  Osteopenia And 10-Year Fracture Risk Probability By Frax Of Greater than Or Equal to 20% For Major Osteoporotic Fracture and Greater than Or Equal to 3% For Hip Fracture.    Comment: All Treatment Decisions, Including Pharmacologic Treatment, Require Clinical Judgment and Consideration Of Individual Patient Factors,  Including Patient Preferences, Comorbidities, Previous Drug Use And Risk Factors not captured in the Frax Model, e.g. Fragility, Falls, Vitamin D Deficiency, Increased Bone turnover, and Interval Significant Decline In Bone Mineral Density. Workstation:[id number]    I personally reviewed and interpreted the above laboratory and imaging data.    Discussion/Summary: 63-year-old female status post excision of an atypical lipomatous tumor.  This was marginally resected and margins cannot be determined.  The lesion was 5 cm in size and was below the latissimus dorsi muscle.  I discussed that she will likely require a conservative resection.  I have recommended obtaining an MRI of the resection bed to better visualize this area.  If there is residual disease she will require a more aggressive resection.  If nothing is obvious seen on MRI, she will likely require resection to ensure completely negative margins.  Also discussed possible observation this setting.  I will see her back once we have the results of the MRI.  She is agreeable to this.  All her questions were answered.         no

## 2024-12-28 ENCOUNTER — HOSPITAL ENCOUNTER (OUTPATIENT)
Dept: MRI IMAGING | Facility: HOSPITAL | Age: 64
Discharge: HOME | End: 2024-12-28
Attending: SURGERY
Payer: COMMERCIAL

## 2024-12-28 DIAGNOSIS — D17.1 LIPOMA OF TORSO: ICD-10-CM

## 2024-12-28 PROCEDURE — A9585 GADOBUTROL INJECTION: HCPCS | Performed by: SURGERY

## 2024-12-28 PROCEDURE — 71552 MRI CHEST W/O & W/DYE: CPT

## 2024-12-28 RX ORDER — GADOBUTROL 604.72 MG/ML
8 INJECTION INTRAVENOUS
Status: COMPLETED | OUTPATIENT
Start: 2024-12-28 | End: 2024-12-28

## 2024-12-28 RX ADMIN — GADOBUTROL 8 ML: 604.72 INJECTION INTRAVENOUS at 10:15

## 2025-01-03 ENCOUNTER — TELEPHONE (OUTPATIENT)
Age: 65
End: 2025-01-03

## 2025-01-03 NOTE — TELEPHONE ENCOUNTER
Patient is asking for her appointment on 1/6/25 to be made a virtual due to the upcoming snow and her travel

## 2025-01-06 ENCOUNTER — TELEMEDICINE (OUTPATIENT)
Dept: SURGICAL ONCOLOGY | Facility: CLINIC | Age: 65
End: 2025-01-06
Payer: COMMERCIAL

## 2025-01-06 DIAGNOSIS — C49.6: Primary | ICD-10-CM

## 2025-01-06 PROCEDURE — G2211 COMPLEX E/M VISIT ADD ON: HCPCS

## 2025-01-06 PROCEDURE — 99213 OFFICE O/P EST LOW 20 MIN: CPT

## 2025-01-06 NOTE — LETTER
2025     Eduard Beckwith MD  [address]  [address]  [facility] [address]    Patient: Brigid Linn   YOB: 1960   Date of Visit: 2025       Dear Dr. Beckwith:    Thank you for referring Brigid Linn to me for evaluation. Below are my notes for this consultation.    If you have questions, please do not hesitate to call me. I look forward to following your patient along with you.         Sincerely,        CHRISTOPHER Greene        CC: No Recipients    CHRISTOPHER Greene  2025 12:44 PM  Sign when Signing Visit  Virtual Regular Visit  Name: Brigid Linn      : 1960      MRN: [id number]  Encounter Provider: CHRISTOPHER Greene  Encounter Date: 2025   Encounter department: [facility] [place]      Verification of patient location:  Patient is located at Home in the following state in which I hold an active license [place] :  Assessment & Plan  Atypical lipomatous tumor of trunk (HCC)  There is no evidence of tumor recurrence on recent MRI, and previously identified fluid collection has completely resolved.  Since margin status was not able to be established on final pathology, we will plan to continue with close follow-up.  We will see her again in 6 months with repeat MRI.  Orders:  •  MRI chest wall wo and w contrast; Future  •  BUN; Future  •  Creatinine, serum; Future        Encounter provider CHRISTOPHER Greene    The patient was identified by name and date of birth. Brigid Linn was informed that this is a telemedicine visit and that the visit is being conducted through Telephone.  My office door was closed. No one else was in the room.  She acknowledged consent and understanding of privacy and security of the video platform. The patient has agreed to participate and understands they can discontinue the visit at any time.    Patient is aware this is a billable service.     History of Present  Illness    This is a 63 y/o patient who underwent excision of a presumed lipoma adjacent to her left scapula in March 2024.  This was performed by Dr Delgado.  Final pathology revealed a grade 1 atypical lipomatous tumor measuring 8cm.  Unfortunately, margins could not be determined. She then presented to Dr Beckwith for consultation on further intervention vs observation, and ultimately elected observation.  She reports she feels well, and states that her range of motion has returned to normal.  She denies any lumps, masses, swelling or numbness.  MRI was performed on December 28, and I have reviewed these results with her today.                Review of Systems   Constitutional:  Negative for activity change, appetite change, fatigue and unexpected weight change.   HENT: Negative.     Respiratory: Negative.     Cardiovascular: Negative.    Gastrointestinal: Negative.    Musculoskeletal: Negative.    Skin: Negative.  Negative for color change and wound.   Neurological: Negative.  Negative for weakness and numbness.   Hematological: Negative.    Psychiatric/Behavioral: Negative.         Objective  There were no vitals taken for this visit.    Physical Exam  Not performed.    Imaging  MRI chest wall wo and w contrast  12/28/2024  Narrative & Impression   MRI CHEST WALL WO AND W CONTRAST     INDICATION:   D17.1: Benign lipomatous neoplasm of skin and subcutaneous tissue of trunk. Surgical oncology note from 5/9/2024 was reviewed. Patient is status post excision for atypical lipomatous tumor located below the left latissimus dorsi.     Date of surgery with 3/13/2024 based on op note.     COMPARISON: Comparison primary made with the most recent chest wall MR from 4/23/2024. Multiple older multiple older CTs were also reviewed.     TECHNIQUE:  Multiplanar/multisequence MR of the chest wall was performed with and without IV contrast.        IV Contrast:  8 mL of Gadobutrol injection (SINGLE-DOSE)     FINDINGS:      SUBCUTANEOUS TISSUES: Normal     BONES:  No fracture, dislocation or destructive osseous lesion.     ARTICULAR SURFACES:  Normal.     VISUALIZED MUSCULATURE: No evidence of residual or recurrent mass in the left chest wall, which based on surgical report was deep to the latissimus dorsi, and attached to the serratus anterior (series 801 images 43-52.)     LUNGS AND MEDIASTINUM: Limited evaluation by MRI technique. No gross abnormalities.     OTHER SOFT TISSUES: The previously seen fluid collection in the left posterior upper back extending into the trapezius muscle has resolved, consistent with resolution of postoperative seroma or lymphocele (series 4 images 15-19.)     IMPRESSION:     No evidence of residual or recurrent mass in the left chest wall.     The previously seen fluid collection in the left posterior upper back extending into the trapezius muscle has resolved, consistent with resolution of postoperative seroma or lymphocele (series 4 images 15-19.)       Visit Time  Total Visit Duration: 10 minutes including direct patient communication and chart/imaging review.

## 2025-01-06 NOTE — ASSESSMENT & PLAN NOTE
There is no evidence of tumor recurrence on recent MRI, and previously identified fluid collection has completely resolved.  Since margin status was not able to be established on final pathology, we will plan to continue with close follow-up.  We will see her again in 6 months with repeat MRI.  Orders:  •  MRI chest wall wo and w contrast; Future  •  BUN; Future  •  Creatinine, serum; Future

## 2025-06-26 ENCOUNTER — TELEPHONE (OUTPATIENT)
Dept: SURGICAL ONCOLOGY | Facility: CLINIC | Age: 65
End: 2025-06-26

## 2025-06-26 NOTE — TELEPHONE ENCOUNTER
Called patient and spoke to Brigid, she is on vacation for the next several weeks and unsure of exact date when she will be back. She will call Central Scheduling to schedule to MRI and the [facility] number to schedule the office visit with Dr. Beckwith as soon as she gets back. I confirmed she has both appointments and stated to schedule the office visit 1-2 weeks after the MRI. Patient was thankful for the call.

## (undated) DEVICE — ANTIBACTERIAL UNDYED BRAIDED (POLYGLACTIN 910), SYNTHETIC ABSORBABLE SUTURE: Brand: COATED VICRYL

## (undated) DEVICE — CHEST/BREAST DRAPE: Brand: CONVERTORS

## (undated) DEVICE — GLOVE INDICATOR UNDERGLOVE SZ 7.5 GREEN

## (undated) DEVICE — 3M™ TEGADERM™ TRANSPARENT FILM DRESSING FRAME STYLE, 1624W, 2-3/8 IN X 2-3/4 IN (6 CM X 7 CM), 100/CT 4CT/CASE: Brand: 3M™ TEGADERM™

## (undated) DEVICE — SCD SEQUENTIAL COMPRESSION COMFORT SLEEVE MEDIUM KNEE LENGTH: Brand: KENDALL SCD

## (undated) DEVICE — STOPCOCK 3-WAY

## (undated) DEVICE — TUBING SUCTION 5MM X 12 FT

## (undated) DEVICE — BULB SYRINGE,IRRIGATION WITH PROTECTIVE CAP: Brand: DOVER

## (undated) DEVICE — STERILE POLYISOPRENE POWDER-FREE SURGICAL GLOVES: Brand: PROTEXIS

## (undated) DEVICE — SYRINGE 30ML LL

## (undated) DEVICE — SUT MONOCRYL 3-0 PS-2 27 IN Y427H

## (undated) DEVICE — SUT MONOCRYL 4-0 P-3 18 IN Y494G

## (undated) DEVICE — ENDOPATH 5 MM GRASPERS WITH RATCHET HANDLES: Brand: ENDOPATH

## (undated) DEVICE — NEEDLE 25G X 1 1/2

## (undated) DEVICE — ADHESIVE SKIN HIGH VISCOSITY EXOFIN 1ML

## (undated) DEVICE — SWABSTCK, BENZOIN TINCTURE, 1/PK, STRL: Brand: APLICARE

## (undated) DEVICE — GLOVE INDICATOR UNDERGLOVE SZ 7 GREEN

## (undated) DEVICE — PENCIL ELECTROSURG E-Z CLEAN -0035H

## (undated) DEVICE — INSUFFLATION NEEDLE: Brand: SURGINEEDLE

## (undated) DEVICE — SOLUTION BOWL: Brand: KENDALL

## (undated) DEVICE — 4-PORT MANIFOLD: Brand: NEPTUNE 2

## (undated) DEVICE — GLOVE INDICATOR PI UNDERGLOVE SZ 6.5 BLUE

## (undated) DEVICE — IRRIGATOR DISPOSABLE SUCTION

## (undated) DEVICE — TAUT CATH INTRODUCER 4.5 FR

## (undated) DEVICE — GAUZE SPONGES,16 PLY: Brand: CURITY

## (undated) DEVICE — SYRINGE 10ML LL CONTROL TOP

## (undated) DEVICE — CHLORAPREP HI-LITE 26ML ORANGE

## (undated) DEVICE — 5 MM CURVED DISSECTORS WITH MONOPOLAR CAUTERY: Brand: ENDOPATH

## (undated) DEVICE — DISPOSABLE LAPAROSCOPIC CLIP APPLIER WITH 20 CLIPS.: Brand: EPIX® UNIVERSAL CLIP APPLIER

## (undated) DEVICE — SINGLE PORT MANIFOLD: Brand: NEPTUNE 2

## (undated) DEVICE — BRUSH EZ SCRUB PCMX W/NAIL CLEANER

## (undated) DEVICE — GLOVE SRG BIOGEL 8

## (undated) DEVICE — INTENDED FOR TISSUE SEPARATION, AND OTHER PROCEDURES THAT REQUIRE A SHARP SURGICAL BLADE TO PUNCTURE OR CUT.: Brand: BARD-PARKER ® CARBON RIB-BACK BLADES

## (undated) DEVICE — IV CATH 14 G X 3 1/4 IN

## (undated) DEVICE — BASIC SINGLE BASIN-LF: Brand: MEDLINE INDUSTRIES, INC.

## (undated) DEVICE — ASTOUND STANDARD SURGICAL GOWN, XL: Brand: CONVERTORS

## (undated) DEVICE — PACK PBDS LAP CHOLE RF

## (undated) DEVICE — SKIN MARKER DUAL TIP WITH RULER CAP, FLEXIBLE RULER AND LABELS: Brand: DEVON

## (undated) DEVICE — TROCAR: Brand: KII FIOS FIRST ENTRY

## (undated) DEVICE — GLOVE INDICATOR PI UNDERGLOVE SZ 7 BLUE

## (undated) DEVICE — GAUZE SPONGES,8 PLY: Brand: CURITY

## (undated) DEVICE — GARMENT,MEDLINE,DVT,INT,CALF,FOAM,MED: Brand: MEDLINE

## (undated) DEVICE — LAPAROSCOPIC SCISSORS: Brand: EPIX LAPAROSCOPIC SCISSORS

## (undated) DEVICE — PLUMEPEN PRO 10FT

## (undated) DEVICE — STERISTRIP 1/2 X 4IN

## (undated) DEVICE — SUT VICRYL 0 UR-6 27 IN J603H

## (undated) DEVICE — SUT CHROMIC 5-0 P-3 18 IN 687G

## (undated) DEVICE — TROCAR: Brand: KII® SLEEVE

## (undated) DEVICE — ELECTRODE LAP L HOOK E-Z CLEAN 33CM -0020

## (undated) DEVICE — NEEDLE BLUNT 18 G X 1 1/2IN

## (undated) DEVICE — GLOVE INDICATOR PI UNDERGLOVE SZ 7.5 BLUE

## (undated) DEVICE — SPONGE GAUZE 2 X 2 4PLY STRL

## (undated) DEVICE — BASIC PACK: Brand: CONVERTORS

## (undated) DEVICE — 1820 FOAM BLOCK NEEDLE COUNTER: Brand: DEVON

## (undated) DEVICE — ENDOPOUCH RETRIEVER SPECIMEN RETRIEVAL BAGS: Brand: ENDOPOUCH RETRIEVER

## (undated) DEVICE — SUT VICRYL 3-0 PS-2 18 IN J497G

## (undated) DEVICE — SPONGE 4 X 4 XRAY 16 PLY STRL LF RFD

## (undated) DEVICE — DISPOSABLE OR TOWEL: Brand: CARDINAL HEALTH

## (undated) DEVICE — TROCAR 11MM KIT OPTICAL SEPARATOR SYSTEM

## (undated) DEVICE — LIGHT GLOVE GREEN

## (undated) DEVICE — TUBING SMOKE EVAC W/FILTRATION DEVICE PLUMEPORT ACTIV

## (undated) DEVICE — ELECTRODE NEEDLE MOD E-Z CLEAN 2.75IN 7CM -0013M

## (undated) DEVICE — 3M™ STERI-STRIP™ REINFORCED ADHESIVE SKIN CLOSURES, R1542, 1/4 IN X 1-1/2 IN (6 MM X 38 MM), 6 STRIPS/ENVELOPE: Brand: 3M™ STERI-STRIP™

## (undated) DEVICE — INTENDED FOR TISSUE SEPARATION, AND OTHER PROCEDURES THAT REQUIRE A SHARP SURGICAL BLADE TO PUNCTURE OR CUT.: Brand: BARD-PARKER ® SAFETYLOCK CARBON RIB-BACK BLADES